# Patient Record
Sex: MALE | Race: WHITE | NOT HISPANIC OR LATINO | Employment: OTHER | URBAN - METROPOLITAN AREA
[De-identification: names, ages, dates, MRNs, and addresses within clinical notes are randomized per-mention and may not be internally consistent; named-entity substitution may affect disease eponyms.]

---

## 2017-01-31 ENCOUNTER — PATIENT OUTREACH (OUTPATIENT)
Dept: ADMINISTRATIVE | Facility: HOSPITAL | Age: 57
End: 2017-01-31

## 2017-01-31 NOTE — PROGRESS NOTES
Eye exam due report.    Due for your annual diabetic eye exam, colon cancer screening, and possibly pneumonia and tetanus immunizations

## 2017-02-08 ENCOUNTER — OFFICE VISIT (OUTPATIENT)
Dept: FAMILY MEDICINE | Facility: CLINIC | Age: 57
End: 2017-02-08
Payer: COMMERCIAL

## 2017-02-08 VITALS
TEMPERATURE: 99 F | SYSTOLIC BLOOD PRESSURE: 132 MMHG | RESPIRATION RATE: 18 BRPM | HEART RATE: 80 BPM | DIASTOLIC BLOOD PRESSURE: 78 MMHG | WEIGHT: 252.19 LBS | HEIGHT: 68 IN | BODY MASS INDEX: 38.22 KG/M2

## 2017-02-08 DIAGNOSIS — J06.9 UPPER RESPIRATORY TRACT INFECTION, UNSPECIFIED TYPE: Primary | ICD-10-CM

## 2017-02-08 PROCEDURE — 99213 OFFICE O/P EST LOW 20 MIN: CPT | Mod: S$GLB,,, | Performed by: INTERNAL MEDICINE

## 2017-02-08 PROCEDURE — 3075F SYST BP GE 130 - 139MM HG: CPT | Mod: S$GLB,,, | Performed by: INTERNAL MEDICINE

## 2017-02-08 PROCEDURE — 3078F DIAST BP <80 MM HG: CPT | Mod: S$GLB,,, | Performed by: INTERNAL MEDICINE

## 2017-02-08 NOTE — PROGRESS NOTES
"Subjective:       Patient ID: Sergo Hollingsworth is a 56 y.o. male.    Current Outpatient Prescriptions   Medication Sig Dispense Refill    amlodipine-benazepril 5-20 mg (LOTREL) 5-20 mg per capsule Take 1 capsule by mouth once daily.       atorvastatin (LIPITOR) 10 MG tablet Take 1 tablet (10 mg total) by mouth once daily. 90 tablet 3    metformin (GLUCOPHAGE) 1000 MG tablet Take 1,000 mg by mouth daily with breakfast.      SITagliptan (JANUVIA) 25 MG Tab Take 1 tablet (25 mg total) by mouth once daily. 90 tablet 3    benazepril (LOTENSIN) 20 MG tablet Take 1 tablet (20 mg total) by mouth once daily. 30 tablet 6    triamterene-hydrochlorothiazide 37.5-25 mg (DYAZIDE) 37.5-25 mg per capsule Take 1 capsule by mouth every morning. 90 capsule 2     No current facility-administered medications for this visit.      Chief Complaint: Cough (x 3 days, "coughing up phlegm") and Nasal Congestion (x 3 days, taking Mucinex)  He presents with 3 days of coughing with congestion.  He has cough productive of grayish mucous.  No fevers. No wheezing or shortness of breath. Mild sore throat. No ear pain.  No body aches.  No vomiting or diarrhea.  No sick contacts. No rashes.      Review of Systems   Constitutional: Negative for activity change, appetite change, chills, fatigue and fever.   HENT: Positive for congestion and postnasal drip. Negative for ear discharge, ear pain, mouth sores, rhinorrhea, sinus pressure and sore throat.    Eyes: Negative for pain, discharge and redness.   Respiratory: Positive for cough. Negative for chest tightness, shortness of breath and wheezing.    Gastrointestinal: Negative for abdominal pain, constipation, diarrhea, nausea and vomiting.   Genitourinary: Negative for dysuria.   Musculoskeletal: Negative for arthralgias and neck stiffness.   Skin: Negative for rash.   Neurological: Negative for headaches.   Hematological: Negative for adenopathy.       Objective:      Vitals:    02/08/17 0825 " "02/08/17 0847   BP: (!) 142/80 132/78   BP Location: Right arm    Patient Position: Sitting    BP Method: Manual    Pulse: 80    Resp: 18    Temp: 98.8 °F (37.1 °C)    TempSrc: Temporal    Weight: 114.4 kg (252 lb 3.3 oz)    Height: 5' 8" (1.727 m)      Body mass index is 38.35 kg/(m^2).  Physical Exam    General appearance: alert, no acute distress  Head: atraumatic  Eyes: PERRL, EMOI, normal conjunctiva, no drainage  Ears: tm normal with good visualization of landmarks, no erythema or pus, canals normal, external ear normal  Nose: erythematous mucosa, no polyps or sores, clear rhinorrhea  Throat: no erythema, no exudates, tonsils appear normal  Mouth: no sores or lesion, moist mucous membranes  Neck: supple, FROM, no masses, no tenderness  Lymph: no posterior or cervical adenopathy  Lungs: no distress, no retractions, clear to ascultation bilaterally, no wheezing, no rales, no rhonchi  Heart:: Regular rate and rhythm, no murmur  Abdomen: soft, non-tender, no guarding, no rebound, no peritoneal signs, bowel sounds normal, no hepatosplenomegaly, no masses  Skin: no rashes or lesion  Perfusion: good capillary refill, normal pulses    Assessment:       1. Upper respiratory tract infection, unspecified type        Plan:       Upper respiratory tract infection, unspecified type  Reassurance and supportive care. No need for antibiotics at this point. Advised of the signs of worsening to return to clinic.      Return if symptoms worsen or fail to improve.      "

## 2017-03-31 RX ORDER — AMLODIPINE AND BENAZEPRIL HYDROCHLORIDE 5; 20 MG/1; MG/1
1 CAPSULE ORAL DAILY
Qty: 90 CAPSULE | Refills: 3 | Status: SHIPPED | OUTPATIENT
Start: 2017-03-31 | End: 2017-08-09 | Stop reason: SDUPTHER

## 2017-03-31 NOTE — TELEPHONE ENCOUNTER
----- Message from Navya Hahn sent at 3/31/2017 10:53 AM CDT -----  Contact: self: [phone]  Refill request:    RX name: amlodipine-benazepril 5-20 mg (LOTREL) 5-20 mg per capsule  Pharmacy name: [facility]  Pharmacy phone/location: [phone]  Phone number where pt can be reached: [phone]

## 2017-04-07 ENCOUNTER — PATIENT MESSAGE (OUTPATIENT)
Dept: FAMILY MEDICINE | Facility: CLINIC | Age: 57
End: 2017-04-07

## 2017-04-07 ENCOUNTER — PATIENT OUTREACH (OUTPATIENT)
Dept: ADMINISTRATIVE | Facility: HOSPITAL | Age: 57
End: 2017-04-07

## 2017-04-07 NOTE — PROGRESS NOTES
PRE-VISIT CHART REVIEW    Appointment Scheduled on 4/21/17    Department stratifications & guidelines reviewed:yes    Target Chronic Diagnosis: DM, HTN, obesity    Chronic Diagnosis Intervention Due: yes    Goals Updated:No    Health Maintenance Due   Topic Date Due    Eye Exam  02/14/1968    TETANUS VACCINE  02/14/1976    Pneumococcal PPSV23 (Medium Risk) (1) 02/14/1976    Colonoscopy  02/14/2008       Advanced Directives:   65 years of age or older?  No  Directive on file?  N\A                                      Pre-visit patient communication: 04/07/2017 MyChart/Letter    Studies or screenings scheduled pre-visit: no    Educate patient on DM (7.6), obesity (38.35)

## 2017-04-28 ENCOUNTER — OFFICE VISIT (OUTPATIENT)
Dept: FAMILY MEDICINE | Facility: CLINIC | Age: 57
End: 2017-04-28
Payer: COMMERCIAL

## 2017-04-28 VITALS
TEMPERATURE: 98 F | RESPIRATION RATE: 18 BRPM | OXYGEN SATURATION: 96 % | HEIGHT: 68 IN | BODY MASS INDEX: 38.19 KG/M2 | DIASTOLIC BLOOD PRESSURE: 84 MMHG | SYSTOLIC BLOOD PRESSURE: 134 MMHG | WEIGHT: 252 LBS | HEART RATE: 84 BPM

## 2017-04-28 DIAGNOSIS — Z12.5 SCREENING FOR PROSTATE CANCER: ICD-10-CM

## 2017-04-28 DIAGNOSIS — Z12.11 SCREEN FOR COLON CANCER: ICD-10-CM

## 2017-04-28 DIAGNOSIS — E11.9 TYPE 2 DIABETES MELLITUS WITHOUT COMPLICATION, WITHOUT LONG-TERM CURRENT USE OF INSULIN: Primary | ICD-10-CM

## 2017-04-28 DIAGNOSIS — I10 ESSENTIAL HYPERTENSION: ICD-10-CM

## 2017-04-28 DIAGNOSIS — Z23 NEED FOR TDAP VACCINATION: ICD-10-CM

## 2017-04-28 DIAGNOSIS — M79.642 LEFT HAND PAIN: ICD-10-CM

## 2017-04-28 DIAGNOSIS — Z23 NEED FOR PNEUMOCOCCAL VACCINATION: ICD-10-CM

## 2017-04-28 DIAGNOSIS — E78.5 HYPERLIPIDEMIA, UNSPECIFIED HYPERLIPIDEMIA TYPE: ICD-10-CM

## 2017-04-28 LAB
ALBUMIN SERPL BCP-MCNC: 4.2 G/DL
ALP SERPL-CCNC: 57 U/L
ALT SERPL W/O P-5'-P-CCNC: 43 U/L
ANION GAP SERPL CALC-SCNC: 9 MMOL/L
AST SERPL-CCNC: 36 U/L
BILIRUB SERPL-MCNC: 0.8 MG/DL
BUN SERPL-MCNC: 13 MG/DL
CALCIUM SERPL-MCNC: 9.5 MG/DL
CHLORIDE SERPL-SCNC: 103 MMOL/L
CHOLEST/HDLC SERPL: 2.6 {RATIO}
CO2 SERPL-SCNC: 25 MMOL/L
COMPLEXED PSA SERPL-MCNC: 0.23 NG/ML
CREAT SERPL-MCNC: 1.2 MG/DL
EST. GFR  (AFRICAN AMERICAN): >60 ML/MIN/1.73 M^2
EST. GFR  (NON AFRICAN AMERICAN): >60 ML/MIN/1.73 M^2
GLUCOSE SERPL-MCNC: 184 MG/DL
HDL/CHOLESTEROL RATIO: 38.9 %
HDLC SERPL-MCNC: 126 MG/DL
HDLC SERPL-MCNC: 49 MG/DL
LDLC SERPL CALC-MCNC: 53.8 MG/DL
NONHDLC SERPL-MCNC: 77 MG/DL
POTASSIUM SERPL-SCNC: 4 MMOL/L
PROT SERPL-MCNC: 8.1 G/DL
SODIUM SERPL-SCNC: 137 MMOL/L
TRIGL SERPL-MCNC: 116 MG/DL

## 2017-04-28 PROCEDURE — 90472 IMMUNIZATION ADMIN EACH ADD: CPT | Mod: S$GLB,,, | Performed by: INTERNAL MEDICINE

## 2017-04-28 PROCEDURE — 80061 LIPID PANEL: CPT

## 2017-04-28 PROCEDURE — 84153 ASSAY OF PSA TOTAL: CPT

## 2017-04-28 PROCEDURE — 83036 HEMOGLOBIN GLYCOSYLATED A1C: CPT

## 2017-04-28 PROCEDURE — 90732 PPSV23 VACC 2 YRS+ SUBQ/IM: CPT | Mod: S$GLB,,, | Performed by: INTERNAL MEDICINE

## 2017-04-28 PROCEDURE — 3079F DIAST BP 80-89 MM HG: CPT | Mod: S$GLB,,, | Performed by: INTERNAL MEDICINE

## 2017-04-28 PROCEDURE — 99214 OFFICE O/P EST MOD 30 MIN: CPT | Mod: 25,S$GLB,, | Performed by: INTERNAL MEDICINE

## 2017-04-28 PROCEDURE — 80053 COMPREHEN METABOLIC PANEL: CPT

## 2017-04-28 PROCEDURE — 4010F ACE/ARB THERAPY RXD/TAKEN: CPT | Mod: S$GLB,,, | Performed by: INTERNAL MEDICINE

## 2017-04-28 PROCEDURE — 90471 IMMUNIZATION ADMIN: CPT | Mod: S$GLB,,, | Performed by: INTERNAL MEDICINE

## 2017-04-28 PROCEDURE — 1160F RVW MEDS BY RX/DR IN RCRD: CPT | Mod: S$GLB,,, | Performed by: INTERNAL MEDICINE

## 2017-04-28 PROCEDURE — 3075F SYST BP GE 130 - 139MM HG: CPT | Mod: S$GLB,,, | Performed by: INTERNAL MEDICINE

## 2017-04-28 PROCEDURE — 90715 TDAP VACCINE 7 YRS/> IM: CPT | Mod: S$GLB,,, | Performed by: INTERNAL MEDICINE

## 2017-04-28 PROCEDURE — 3045F PR MOST RECENT HEMOGLOBIN A1C LEVEL 7.0-9.0%: CPT | Mod: S$GLB,,, | Performed by: INTERNAL MEDICINE

## 2017-04-28 PROCEDURE — 36415 COLL VENOUS BLD VENIPUNCTURE: CPT | Mod: S$GLB,,, | Performed by: INTERNAL MEDICINE

## 2017-04-28 NOTE — PROGRESS NOTES
Venipuncture performed with 23 gauge butterfly, attempts x's 1 , to R Antecubital vein.  Specimens collected per orders.      Pressure dressing applied to site, instructed patient to: remove dressing in 10-15 minutes, OK to re-adjust dressing if pressure causing any discomfort, observe closely for numbness and/or discoloration to hand or fingers, and to notify provider if bleeding persists after applying constant pressure lasting 30 minutes.     Informed patient to follow up as directed by ordering provider.     Patient voices understanding.     After visit summary inclusive of instructions above printed and given to patient.

## 2017-04-28 NOTE — MR AVS SNAPSHOT
[facility]  [address] [address] [address]  [facility] [address]  Phone: [phone]  Fax: [phone]                  Sergo Hollingsworth   2017 8:00 AM   Office Visit    Description:  Male : 1960   Provider:  Kiah Parry DO   Department:  [facility]           Reason for Visit     4 month follow up     Hand Pain, Left hand/wrist           Diagnoses this Visit        Comments    Type 2 diabetes mellitus without complication, without long-term current use of insulin    -  Primary     Essential hypertension         Obesity, Class II, BMI 35-39.9, with comorbidity         Hyperlipidemia, unspecified hyperlipidemia type         Screening for prostate cancer         Screen for colon cancer         Need for Tdap vaccination         Need for pneumococcal vaccination                To Do List           Future Appointments        Provider Department Dept Phone    2017 2:30 PM Keila Alex MD [place] - Ophthalmology [phone]    2017 8:00 AM Kiah Parry DO [facility] [phone]      Goals (5 Years of Data)     None      Follow-Up and Disposition     Return in about 4 months (around 2017) for chronic medical issues.      [facility] On [facility]     [facility] On Call Nurse Care Line -  Assistance  Unless otherwise directed by your provider, please contact [facility], our nurse care line that is available for  assistance.     Registered nurses in the [facility] On Call Center provide: appointment scheduling, clinical advisement, health education, and other advisory services.  Call: [phone] (toll free)               Medications           Message regarding Medications     Verify the changes and/or additions to your medication regime listed below are the same as discussed with your clinician today.  If any of these changes or additions are incorrect, please notify your healthcare provider.        STOP taking  "these medications     benazepril (LOTENSIN) 20 MG tablet Take 1 tablet (20 mg total) by mouth once daily.           Verify that the below list of medications is an accurate representation of the medications you are currently taking.  If none reported, the list may be blank. If incorrect, please contact your healthcare provider. Carry this list with you in case of emergency.           Current Medications     amlodipine-benazepril 5-20 mg (LOTREL) 5-20 mg per capsule Take 1 capsule by mouth once daily.    atorvastatin (LIPITOR) 10 MG tablet Take 1 tablet (10 mg total) by mouth once daily.    metformin (GLUCOPHAGE) 1000 MG tablet Take 1,000 mg by mouth daily with breakfast.    SITagliptan (JANUVIA) 25 MG Tab Take 1 tablet (25 mg total) by mouth once daily.    triamterene-hydrochlorothiazide 37.5-25 mg (DYAZIDE) 37.5-25 mg per capsule Take 1 capsule by mouth every morning.           Clinical Reference Information           Your Vitals Were     BP Pulse Temp Resp Height Weight    134/84 (BP Location: Right arm, Patient Position: Sitting, BP Method: Manual) 84 98.4 °F (36.9 °C) (Oral) 18 5' 8" (1.727 m) 114.3 kg (251 lb 15.8 oz)    SpO2 BMI             96% 38.31 kg/m2         Blood Pressure          Most Recent Value    BP  134/84      Allergies as of 4/28/2017     No Known Allergies      Immunizations Administered on Date of Encounter - 4/28/2017     Name Date Dose VIS Date Route    Pneumococcal Polysaccharide - 23 Valent  Incomplete 0.5 mL 4/24/2015 Intramuscular    TDAP 4/28/2017 0.5 mL 2/24/2015 Intramuscular      Orders Placed During Today's Visit      Normal Orders This Visit    Ambulatory referral to Ophthalmology     Case request GI: COLONOSCOPY     Comprehensive metabolic panel     Hemoglobin A1c     Lipid panel     Pneumococcal Polysaccharide Vaccine (23 Valent) (SQ/IM)     PSA, Screening     Tdap Vaccine       Language Assistance Services     ATTENTION: Language assistance services are available, free of " charge. Please call [phone].      ATENCIÓN: Si habla español, tiene a marcus disposición servicios gratuitos de asistencia lingüística. Llame al [phone].     CHÚ Ý: N?u b?n nói Ti?ng Vi?t, có các d?ch v? h? tr? ngôn ng? mi?n phí dành cho b?n. G?i s? [phone].         [facility] complies with applicable Federal civil rights laws and does not discriminate on the basis of race, color, national origin, age, disability, or sex.

## 2017-04-28 NOTE — PROGRESS NOTES
Subjective:       Patient ID: Sergo Hollingsworth is a 57 y.o. male.    Current Outpatient Prescriptions   Medication Sig Dispense Refill    amlodipine-benazepril 5-20 mg (LOTREL) 5-20 mg per capsule Take 1 capsule by mouth once daily. 90 capsule 3    atorvastatin (LIPITOR) 10 MG tablet Take 1 tablet (10 mg total) by mouth once daily. 90 tablet 3    metformin (GLUCOPHAGE) 1000 MG tablet Take 1,000 mg by mouth daily with breakfast.      SITagliptan (JANUVIA) 25 MG Tab Take 1 tablet (25 mg total) by mouth once daily. 90 tablet 3    triamterene-hydrochlorothiazide 37.5-25 mg (DYAZIDE) 37.5-25 mg per capsule Take 1 capsule by mouth every morning. 90 capsule 2     No current facility-administered medications for this visit.      Chief Complaint: 4 month follow up and Hand Pain, Left hand/wrist  He is here today to f/u on chronic medical issues.     He has hypertension dx since 2006. He says his BP has been elevated since high school. He has no known CAD. He denies cholesterol issues. He is taking combination benazepril-amlodipine 20-5 mg qday and triamterene-HCTZ 37.5-25 mg qday. He does not check his BP at home.      He has diabetes and is taking metformin 1000 mg twice daily and januvia 25 mg qday.  His last HbA1c is 7.6 on 12/2016 (before the addition of januvia).. He had an eye exam in 2/2016 and his foot exam is UTD.  His microalbumin is positive and he is taking an ACEI.  He denies any neuropathy or vision issues.      He has hyperlipidemia and was started on atorvastatin for lipids on 12/2016 that were 185/125/44/115. He is due to recheck today.  He does take an aspirin daily.     He does complain of 3 weeks of left wrist and palm hurting. No swelling or erythema. It was alittle warm.  He wore an immobilizer which seemed to help with his pain. He says it is much better and only hurts if he positions it a certain way. He is not taking any pain medication.      He refused immunizations in the past but is now  "willing to take.      He stays very active. He is not careful with his diet and he does not exercise.       Colonoscopy----2007 repeat in 10 years  Tdap---more than 10 years and refuses  Pneumovax---none  Prenar--none  Influenza vaccine---none  Shingles vaccine---none    Review of Systems   Constitutional: Negative for appetite change, fatigue, fever and unexpected weight change.   HENT: Negative for congestion, ear pain, hearing loss, sore throat and trouble swallowing.    Eyes: Negative for pain and visual disturbance.   Respiratory: Negative for cough, chest tightness, shortness of breath and wheezing.    Cardiovascular: Negative for chest pain, palpitations and leg swelling.   Gastrointestinal: Negative for abdominal pain, blood in stool, constipation, diarrhea, nausea and vomiting.   Endocrine: Negative for polyuria.   Genitourinary: Negative for dysuria and hematuria.   Musculoskeletal: Positive for arthralgias. Negative for back pain and myalgias.   Skin: Negative for rash.   Neurological: Negative for dizziness, weakness, numbness and headaches.   Hematological: Does not bruise/bleed easily.   Psychiatric/Behavioral: Negative for dysphoric mood, sleep disturbance and suicidal ideas. The patient is not nervous/anxious.        Objective:      Vitals:    04/28/17 0814   BP: 134/84   BP Location: Right arm   Patient Position: Sitting   BP Method: Manual   Pulse: 84   Resp: 18   Temp: 98.4 °F (36.9 °C)   TempSrc: Oral   SpO2: 96%   Weight: 114.3 kg (251 lb 15.8 oz)   Height: 5' 8" (1.727 m)     Body mass index is 38.31 kg/(m^2).  Physical Exam    General appearance: No acute distress, cooperative  Eyes: PERRL, EOMI, conjunctiva clear  HEENT: ears normal, nose without rhinorrhea, normal turbinates,mouth no sores or lesions, throat no erythema or pus  Neck: FROM, soft, supple, no thyromegaly, no bruits  Lymph: no anterior or posterior cervical adenopathy  Heart::  Regular rate and rhythm, no murmur  Lung: Clear to " ascultation bilaterally, no wheezing, no rales, no rhonchi, no distress  Abdomen: Soft, nontender, no distention, no hepatosplenomegaly, bowel sounds normal, no guarding, no rebound, no peritoneal signs  Skin: no rashes, no lesions  Extremities: no edema, no cyanosis  Neuro: no focal abnormalities, strength 5/5 b/l UE and LE, 2+ DTRs b/l UE and LE, normal gait  Peripheral pulses: 2+ pedal pulses bilaterally, good perfusion and color  Musculoskeletal: FROM, good strenth, no tenderness  Joint: normal appearance, no swelling, no warmth---ROM of left hand without tenderness, no deformity in all joints    Assessment:       1. Type 2 diabetes mellitus without complication, without long-term current use of insulin    2. Essential hypertension    3. Hyperlipidemia, unspecified hyperlipidemia type    4. Left hand pain    5. Obesity, Class II, BMI 35-39.9, with comorbidity    6. Screening for prostate cancer    7. Screen for colon cancer    8. Need for Tdap vaccination    9. Need for pneumococcal vaccination        Plan:       Type 2 diabetes mellitus without complication, without long-term current use of insulin  Uncontrolled with last HbA1c of 7.6. Will recheck now with the addition of januvia to his metformin.  He is UTD on his foot exam and will refer for eye exam.  He is on an ACEI.    -     Hemoglobin A1c  -     Ambulatory referral to Ophthalmology    Essential hypertension  Controlled on this regime.     Hyperlipidemia, unspecified hyperlipidemia type  REcheck lipids after adding atorvastatin to his regimen.   -     Lipid panel  -     Comprehensive metabolic panel    Left hand pain  Reassurance and supportive care.  Consistent with soft tissue injury that is healing.     Obesity, Class II, BMI 35-39.9, with comorbidity  Discussed need for good nutrition and daily exercise.     Screening for prostate cancer  -     PSA, Screening    Screen for colon cancer  -     Case request GI: COLONOSCOPY    Need for Tdap  vaccination  -     Tdap Vaccine    Need for pneumococcal vaccination  -     Pneumococcal Polysaccharide Vaccine (23 Valent) (SQ/IM)    Return in about 4 months (around 8/28/2017) for chronic medical issues.

## 2017-04-29 LAB
ESTIMATED AVG GLUCOSE: 171 MG/DL
HBA1C MFR BLD HPLC: 7.6 %

## 2017-05-01 ENCOUNTER — PATIENT MESSAGE (OUTPATIENT)
Dept: FAMILY MEDICINE | Facility: CLINIC | Age: 57
End: 2017-05-01

## 2017-05-01 NOTE — TELEPHONE ENCOUNTER
Results given by MyChart.  Labs. Increased his januvia.     Please verify that he has checked and seen this email.     Thanks.

## 2017-05-27 ENCOUNTER — PATIENT MESSAGE (OUTPATIENT)
Dept: FAMILY MEDICINE | Facility: CLINIC | Age: 57
End: 2017-05-27

## 2017-06-08 ENCOUNTER — TELEPHONE (OUTPATIENT)
Dept: GASTROENTEROLOGY | Facility: CLINIC | Age: 57
End: 2017-06-08

## 2017-06-08 ENCOUNTER — PATIENT MESSAGE (OUTPATIENT)
Dept: ENDOSCOPY | Facility: HOSPITAL | Age: 57
End: 2017-06-08

## 2017-06-08 NOTE — TELEPHONE ENCOUNTER
----- Message from Tanya Pires sent at 6/8/2017  7:44 AM CDT -----  Please call patient in regards to prep for colonoscopy, he states he is scheduled for procedure tomorrow, [phone] (home)

## 2017-06-09 ENCOUNTER — SURGERY (OUTPATIENT)
Age: 57
End: 2017-06-09

## 2017-06-09 ENCOUNTER — ANESTHESIA (OUTPATIENT)
Dept: ENDOSCOPY | Facility: HOSPITAL | Age: 57
End: 2017-06-09
Payer: COMMERCIAL

## 2017-06-09 ENCOUNTER — HOSPITAL ENCOUNTER (OUTPATIENT)
Facility: HOSPITAL | Age: 57
Discharge: HOME | End: 2017-06-09
Attending: INTERNAL MEDICINE | Admitting: INTERNAL MEDICINE
Payer: COMMERCIAL

## 2017-06-09 ENCOUNTER — ANESTHESIA EVENT (OUTPATIENT)
Dept: ENDOSCOPY | Facility: HOSPITAL | Age: 57
End: 2017-06-09
Payer: COMMERCIAL

## 2017-06-09 DIAGNOSIS — Z12.11 SCREEN FOR COLON CANCER: ICD-10-CM

## 2017-06-09 LAB — GLUCOSE SERPL-MCNC: 215 MG/DL (ref 70–110)

## 2017-06-09 PROCEDURE — 63600175 PHARM REV CODE 636 W HCPCS: Mod: PO | Performed by: NURSE ANESTHETIST, CERTIFIED REGISTERED

## 2017-06-09 PROCEDURE — G0121 COLON CA SCRN NOT HI RSK IND: HCPCS | Mod: PO | Performed by: INTERNAL MEDICINE

## 2017-06-09 PROCEDURE — G0121 COLON CA SCRN NOT HI RSK IND: HCPCS | Mod: ,,, | Performed by: INTERNAL MEDICINE

## 2017-06-09 PROCEDURE — 37000008 HC ANESTHESIA 1ST 15 MINUTES: Mod: PO | Performed by: INTERNAL MEDICINE

## 2017-06-09 PROCEDURE — 37000009 HC ANESTHESIA EA ADD 15 MINS: Mod: PO | Performed by: INTERNAL MEDICINE

## 2017-06-09 PROCEDURE — D9220A PRA ANESTHESIA: Mod: 33,ANES,, | Performed by: ANESTHESIOLOGY

## 2017-06-09 PROCEDURE — 82962 GLUCOSE BLOOD TEST: CPT | Mod: PO | Performed by: INTERNAL MEDICINE

## 2017-06-09 PROCEDURE — D9220A PRA ANESTHESIA: Mod: 33,CRNA,, | Performed by: NURSE ANESTHETIST, CERTIFIED REGISTERED

## 2017-06-09 PROCEDURE — 25000003 PHARM REV CODE 250: Mod: PO | Performed by: NURSE ANESTHETIST, CERTIFIED REGISTERED

## 2017-06-09 PROCEDURE — 25000003 PHARM REV CODE 250: Mod: PO | Performed by: INTERNAL MEDICINE

## 2017-06-09 RX ORDER — SODIUM CHLORIDE, SODIUM LACTATE, POTASSIUM CHLORIDE, CALCIUM CHLORIDE 600; 310; 30; 20 MG/100ML; MG/100ML; MG/100ML; MG/100ML
INJECTION, SOLUTION INTRAVENOUS CONTINUOUS
Status: DISCONTINUED | OUTPATIENT
Start: 2017-06-09 | End: 2017-06-09 | Stop reason: HOSPADM

## 2017-06-09 RX ORDER — LIDOCAINE HCL/PF 100 MG/5ML
SYRINGE (ML) INTRAVENOUS
Status: DISCONTINUED | OUTPATIENT
Start: 2017-06-09 | End: 2017-06-09

## 2017-06-09 RX ORDER — PROPOFOL 10 MG/ML
VIAL (ML) INTRAVENOUS
Status: DISCONTINUED | OUTPATIENT
Start: 2017-06-09 | End: 2017-06-09

## 2017-06-09 RX ADMIN — PROPOFOL 30 MG: 10 INJECTION, EMULSION INTRAVENOUS at 10:06

## 2017-06-09 RX ADMIN — SODIUM CHLORIDE, SODIUM LACTATE, POTASSIUM CHLORIDE, AND CALCIUM CHLORIDE: .6; .31; .03; .02 INJECTION, SOLUTION INTRAVENOUS at 09:06

## 2017-06-09 RX ADMIN — LIDOCAINE HYDROCHLORIDE 100 MG: 20 INJECTION, SOLUTION INTRAVENOUS at 10:06

## 2017-06-09 NOTE — TRANSFER OF CARE
"Anesthesia Transfer of Care Note    Patient: Sergo Hollingsworth    Procedure(s) Performed: Procedure(s) (LRB):  COLONOSCOPY (N/A)    Patient location: PACU    Anesthesia Type: general    Transport from OR: Transported from OR on room air with adequate spontaneous ventilation    Post pain: adequate analgesia    Post assessment: no apparent anesthetic complications and tolerated procedure well    Post vital signs: stable    Level of consciousness: awake and alert    Nausea/Vomiting: no nausea/vomiting    Complications: none    Transfer of care protocol was followed      Last vitals:   Visit Vitals  BP (!) 141/82   Pulse 77   Temp 37 °C (98.6 °F)   Resp 18   Ht 5' 9" (1.753 m)   Wt 113.4 kg (250 lb)   SpO2 97%   BMI 36.92 kg/m²     "

## 2017-06-09 NOTE — H&P
History & Physical - Short Stay  Gastroenterology      SUBJECTIVE:     Procedure: Colonoscopy    Chief Complaint/Indication for Procedure: Screening    PTA Medications   Medication Sig    atorvastatin (LIPITOR) 10 MG tablet Take 1 tablet (10 mg total) by mouth once daily.    metformin (GLUCOPHAGE) 1000 MG tablet Take 1,000 mg by mouth daily with breakfast.    SITagliptan (JANUVIA) 50 MG Tab Take 1 tablet (50 mg total) by mouth once daily.    triamterene-hydrochlorothiazide 37.5-25 mg (DYAZIDE) 37.5-25 mg per capsule Take 1 capsule by mouth every morning.    amlodipine-benazepril 5-20 mg (LOTREL) 5-20 mg per capsule Take 1 capsule by mouth once daily.       Review of patient's allergies indicates:  No Known Allergies     Past Medical History:   Diagnosis Date    Arthritis     Diabetes mellitus, type 2     Hypertension      Past Surgical History:   Procedure Laterality Date    KNEE SURGERY  2010    right knee to repair a torn meniscus     Family History   Problem Relation Age of Onset    Cancer Mother      pancreatic    Cancer Father      lung    Diabetes Sister     Hypertension Sister     Hypertension Brother     Heart disease Maternal Grandmother     Hypertension Maternal Grandmother     Diabetes Paternal Grandmother     Heart disease Paternal Grandmother     Hypertension Paternal Grandmother     Throat cancer Maternal Grandfather     Lung cancer Paternal Grandfather      Social History   Substance Use Topics    Smoking status: Never Smoker    Smokeless tobacco: Not on file    Alcohol use Yes      Comment: social         OBJECTIVE:     Vital Signs (Most Recent)  Temp: 98.6 °F (37 °C) (06/09/17 0919)  Pulse: 77 (06/09/17 0919)  Resp: 18 (06/09/17 0919)  BP: (!) 141/82 (06/09/17 0919)  SpO2: 97 % (06/09/17 0919)    Physical Exam                                                        GENERAL:  Comfortable, in no acute distress.                                 HEENT EXAM:  Nonicteric.  No  adenopathy.  Oropharynx is clear.               NECK:  Supple.                                                               LUNGS:  Clear.                                                               CARDIAC:  Regular rate and rhythm.  S1, S2.  No murmur.                      ABDOMEN:  Soft, positive bowel sounds, nontender.  No hepatosplenomegaly or masses.  No rebound or guarding.                                             EXTREMITIES:  No edema.     MENTAL STATUS:  Normal, alert and oriented.      ASSESSMENT/PLAN:     Assessment: Colorectal cancer screening    Plan: Colonoscopy    Anesthesia Plan: General    ASA Grade: ASA 2 - Patient with mild systemic disease with no functional limitations    MALLAMPATI SCORE:  I (soft palate, uvula, fauces, and tonsillar pillars visible)

## 2017-06-09 NOTE — DISCHARGE SUMMARY
Discharge Note  Short Stay      SUMMARY     Admit Date: 6/9/2017    Attending Physician: Mickey Kahn MD     Discharge Physician: Mickey Kahn MD    Discharge Date: 6/9/2017 10:43 AM    Final Diagnosis: Screen for colon cancer [Z12.11]    Disposition: HOME OR SELF CARE    Patient Instructions:   Current Discharge Medication List      CONTINUE these medications which have NOT CHANGED    Details   atorvastatin (LIPITOR) 10 MG tablet Take 1 tablet (10 mg total) by mouth once daily.  Qty: 90 tablet, Refills: 3      metformin (GLUCOPHAGE) 1000 MG tablet Take 1,000 mg by mouth daily with breakfast.      SITagliptan (JANUVIA) 50 MG Tab Take 1 tablet (50 mg total) by mouth once daily.  Qty: 90 tablet, Refills: 3      triamterene-hydrochlorothiazide 37.5-25 mg (DYAZIDE) 37.5-25 mg per capsule Take 1 capsule by mouth every morning.  Qty: 90 capsule, Refills: 2      amlodipine-benazepril 5-20 mg (LOTREL) 5-20 mg per capsule Take 1 capsule by mouth once daily.  Qty: 90 capsule, Refills: 3             Discharge Procedure Orders (must include Diet, Follow-up, Activity)    Follow Up:  Follow up with PCP as previously scheduled  Resume routine diet.  Activity as tolerated.    No driving day of procedure.

## 2017-06-09 NOTE — DISCHARGE INSTRUCTIONS
Procedural Sedation (Adult)  You have been given medicine by vein to make you sleep during your surgery. This may have included both a pain medicine and sleeping medicine. Most of the effects have worn off. But you may still have some drowsiness for the next 6 to 8 hours.  Home care  Follow these guidelines when you get home:  · For the next 8 hours, you should be watched by a responsible adult. This person should make sure your condition is not getting worse.  · Don't take any medicine by mouth for pain or for sleep during the next 4 hours. These might react with the medicines you were given in the hospital. This could cause a much stronger response than usual.  · Don't drink any alcohol for the next 24 hours.  · Don't drive, operate dangerous machinery, or make important business or personal decisions during the next 24 hours.  Follow-up care  Follow up with your healthcare provider if you are not alert and back to your usual level of activity within 12 hours.  When to seek medical advice  Call your healthcare provider right away if any of these occur:  · Drowsiness gets worse  · Weakness or dizziness gets worse  · Repeated vomiting  · You cannot be awakened   Date Last Reviewed: 10/18/2016  © 6137-5005 [employer]. [address] [place] [address], [place] [address]. All rights reserved. This information is not intended as a substitute for professional medical care. Always follow your healthcare professional's instructions.

## 2017-06-09 NOTE — ANESTHESIA POSTPROCEDURE EVALUATION
"Anesthesia Post Evaluation    Patient: Sergo Hollingsworth    Procedure(s) Performed: Procedure(s) (LRB):  COLONOSCOPY (N/A)    Final Anesthesia Type: general  Patient location during evaluation: PACU  Patient participation: Yes- Able to Participate  Level of consciousness: awake and alert  Post-procedure vital signs: reviewed and stable  Pain management: adequate  Airway patency: patent  PONV status at discharge: No PONV  Anesthetic complications: no      Cardiovascular status: blood pressure returned to baseline and hemodynamically stable  Respiratory status: unassisted  Hydration status: euvolemic  Follow-up not needed.        Visit Vitals  /70   Pulse 79   Temp 36.4 °C (97.5 °F) (Skin)   Resp 16   Ht 5' 9" (1.753 m)   Wt 113.4 kg (250 lb)   SpO2 99%   BMI 36.92 kg/m²       Pain/Saida Score: Pain Assessment Performed: Yes (6/9/2017 11:07 AM)  Presence of Pain: denies (6/9/2017 11:07 AM)  Saida Score: 10 (6/9/2017 11:07 AM)      "

## 2017-06-09 NOTE — ANESTHESIA PREPROCEDURE EVALUATION
06/09/2017  Sergo Hollingsworth is a 57 y.o., male.    Anesthesia Evaluation    I have reviewed the Patient Summary Reports.    I have reviewed the Nursing Notes.      Review of Systems  Cardiovascular:   Hypertension, well controlled    Endocrine:   Diabetes, well controlled, type 2        Physical Exam  General:  Well nourished                 Anesthesia Plan  Type of Anesthesia, risks & benefits discussed:  Anesthesia Type:  general  Patient's Preference:   Intra-op Monitoring Plan:   Intra-op Monitoring Plan Comments:   Post Op Pain Control Plan:   Post Op Pain Control Plan Comments:   Induction:   IV  Beta Blocker:  Patient is not currently on a Beta-Blocker (No further documentation required).       Informed Consent: Patient understands risks and agrees with Anesthesia plan.  Questions answered. Anesthesia consent signed with patient.  ASA Score: 2     Day of Surgery Review of History & Physical:    H&P update referred to the surgeon.         Ready For Surgery From Anesthesia Perspective.

## 2017-06-12 VITALS
OXYGEN SATURATION: 99 % | TEMPERATURE: 98 F | WEIGHT: 250 LBS | DIASTOLIC BLOOD PRESSURE: 70 MMHG | BODY MASS INDEX: 37.03 KG/M2 | SYSTOLIC BLOOD PRESSURE: 119 MMHG | RESPIRATION RATE: 16 BRPM | HEART RATE: 79 BPM | HEIGHT: 69 IN

## 2017-06-13 ENCOUNTER — OFFICE VISIT (OUTPATIENT)
Dept: OPHTHALMOLOGY | Facility: CLINIC | Age: 57
End: 2017-06-13
Payer: COMMERCIAL

## 2017-06-13 DIAGNOSIS — H52.7 REFRACTIVE ERROR: ICD-10-CM

## 2017-06-13 DIAGNOSIS — E11.9 DIABETES MELLITUS TYPE 2 WITHOUT RETINOPATHY: Primary | ICD-10-CM

## 2017-06-13 DIAGNOSIS — H18.821 CONTACT LENS OVERWEAR OF RIGHT EYE: ICD-10-CM

## 2017-06-13 PROCEDURE — 99999 PR PBB SHADOW E&M-EST. PATIENT-LVL II: CPT | Mod: PBBFAC,,, | Performed by: OPHTHALMOLOGY

## 2017-06-13 PROCEDURE — 92004 COMPRE OPH EXAM NEW PT 1/>: CPT | Mod: S$GLB,,, | Performed by: OPHTHALMOLOGY

## 2017-06-13 NOTE — PROGRESS NOTES
HPI     Here for Diabetic exam, NIDDM 11 years states doc would like to see   better control. Denies eye pain or discomfort does not use any eye gtts   currently. States had injury to eye unsure which eye at age 17 had nail   hit eye had to be patched no surgery . No known family h/.o eye disease.   SCL wearer sleeps in contacts for a month at a time.      Hemoglobin A1C       Date                     Value               Ref Range             Status                04/28/2017               7.6 (H)             4.5 - 6.2 %           Final              Comment:    According to ADA guidelines, hemoglobin A1C <7.0% represents  optimal   control in non-pregnant diabetic patients.  Different  metrics may apply   to specific populations.   Standards of Medical Care in Diabetes -   2016.  For the purpose of screening for the presence of diabetes:  <5.7%       Consistent with the absence of diabetes  5.7-6.4%  Consistent with   increasing risk for diabetes   (prediabetes)  >or=6.5%  Consistent with   diabetes  Currently no consensus exists for use of hemoglobin A1C  for   diagnosis of diabetes for children.         12/16/2016               7.6 (H)             4.5 - 6.2 %           Final              Comment:    According to ADA guidelines, hemoglobin A1C <7.0% represents  optimal   control in non-pregnant diabetic patients.  Different  metrics may apply   to specific populations.   Standards of Medical Care in Diabetes -   2016.  For the purpose of screening for the presence of diabetes:  <5.7%       Consistent with the absence of diabetes  5.7-6.4%  Consistent with   increasing risk for diabetes   (prediabetes)  >or=6.5%  Consistent with   diabetes  Currently no consensus exists for use of hemoglobin A1C  for   diagnosis of diabetes for children.    ----------    Agree with above. Presents with wife, Shari. Last eye exam for contacts   about 2 years ago. Contacts are Aqua something, states he stretches it   more. Current  contact lens probably over 3 months old. Only wears OD.   Monovision in glasses as well.    Last edited by Keila Alex MD on 6/13/2017  4:08 PM.   (History)        ROS     Positive for: Neurological (feet get jumpy sometimes, denies pain),   Endocrine (DM diagnosed around 2006), Cardiovascular (HTN - controlled   with meds per pt), Eyes (nail hit eye - patched, no surgery; hx stye, no   other eye procedures)    Negative for: Genitourinary (denies nephropathy), Respiratory (denies   asthma)    Last edited by Keila Alex MD on 6/13/2017  4:01 PM.   (History)        Assessment /Plan     For exam results, see Encounter Report.    Diabetes mellitus type 2 without retinopathy    Contact lens overwear of right eye    Refractive error          Diabetes mellitus type 2 without retinopathy - recommend annual DFE with Dr. Velázquez    Contact lens overwear of right eye - mild pannus considering! I do not recommend sleeping in contacts or wearing longer than designed, especially with diabetes. Refer to Dr. Velázquez for updated Rx.    Refractive error - doing well with monovision OD distance OS near.

## 2017-06-13 NOTE — LETTER
June 13, 2017      Kiah Parry DO  [address] [address]  [address]  [facility] [address]           [facility]  [address] [facility] [address] [place] [address]  Phone: [phone]  Fax: [phone]          Patient: Sergo Hollingsworth   MR Number: [id number]   YOB: 1960   Date of Visit: 6/13/2017       Dear Dr. Kiah Parry:    Thank you for referring Sergo Hollingsworth to me for evaluation. Attached you will find relevant portions of my assessment and plan of care.    If you have questions, please do not hesitate to call me. I look forward to following Srego Hollingsworth along with you.    Sincerely,    Keila Alex MD    Enclosure  CC:  No Recipients    If you would like to receive this communication electronically, please contact [email or web address] or [phone] to request more information on [facility] [employer] access.    For providers and/or their staff who would like to refer a patient to [facility], please contact us through our one-stop-shop provider referral line, [facility], at [phone].    If you feel you have received this communication in error or would no longer like to receive these types of communications, please e-mail [email or web address]

## 2017-06-26 ENCOUNTER — OFFICE VISIT (OUTPATIENT)
Dept: OPTOMETRY | Facility: CLINIC | Age: 57
End: 2017-06-26
Payer: COMMERCIAL

## 2017-06-26 DIAGNOSIS — H52.13 MYOPIA WITH ASTIGMATISM, BILATERAL: Primary | ICD-10-CM

## 2017-06-26 DIAGNOSIS — H52.203 MYOPIA WITH ASTIGMATISM, BILATERAL: Primary | ICD-10-CM

## 2017-06-26 DIAGNOSIS — Z46.0 CONTACT LENS/GLASSES FITTING: ICD-10-CM

## 2017-06-26 PROCEDURE — 99999 PR PBB SHADOW E&M-EST. PATIENT-LVL II: CPT | Mod: PBBFAC,,, | Performed by: OPTOMETRIST

## 2017-06-26 PROCEDURE — 92015 DETERMINE REFRACTIVE STATE: CPT | Mod: S$GLB,,, | Performed by: OPTOMETRIST

## 2017-06-26 PROCEDURE — 92310 CONTACT LENS FITTING OU: CPT | Mod: ,,, | Performed by: OPTOMETRIST

## 2017-06-27 NOTE — PROGRESS NOTES
HPI     Blurred Vision    Additional comments: refraction for updated specs rx           Contact Lens Fit    Additional comments: hx of overwear --- wears distance cls OD only            Comments   Agree above  Monovision with OD distanced / uncorrected        Last edited by LU Velázquez, OD on 6/26/2017  4:46 PM. (History)        ROS     Positive for: Eyes    Negative for: Constitutional, Gastrointestinal, Neurological, Skin,   Genitourinary, Musculoskeletal, HENT, Endocrine, Cardiovascular,   Respiratory, Psychiatric, Allergic/Imm, Heme/Lymph    Last edited by LU Velázquez, OD on 6/26/2017  4:42 PM. (History)        Assessment /Plan     For exam results, see Encounter Report.    Myopia with astigmatism, bilateral    Contact lens/glasses fitting      Updated specs rx, gave copy, fill prn  Updated clrx, gave copy and extra trial    Advised reduced EW more healthy, advised DW only    EXTENDED WEAR CONTACT LENSES  Continue with Extended Wear of contact lenses, up to maximum nights discussed--3 nights max.    Continue with approved contact lens disinfection / rewetting drops as discussed.  Dispose of lenses every 2 weeks as discussed.  Extended wear of contact lenses increases your risk of corneal abrasion, infection, and ulceration.  Stop wearing your lenses and call our office if redness, blurred vision, or pain persists more than 12 hours.  We recommend an annual eye exam for contact lens patients.        Discussed and educated patient on current findings /plan.  RTC 1 year, prn if any changes / issues

## 2017-06-28 ENCOUNTER — OFFICE VISIT (OUTPATIENT)
Dept: PRIMARY CARE CLINIC | Facility: CLINIC | Age: 57
End: 2017-06-28
Payer: COMMERCIAL

## 2017-06-28 VITALS
HEART RATE: 89 BPM | TEMPERATURE: 98 F | HEIGHT: 69 IN | BODY MASS INDEX: 36.9 KG/M2 | WEIGHT: 249.13 LBS | DIASTOLIC BLOOD PRESSURE: 76 MMHG | SYSTOLIC BLOOD PRESSURE: 126 MMHG | OXYGEN SATURATION: 96 %

## 2017-06-28 DIAGNOSIS — J06.9 VIRAL URI: Primary | ICD-10-CM

## 2017-06-28 PROCEDURE — 99999 PR PBB SHADOW E&M-EST. PATIENT-LVL IV: CPT | Mod: PBBFAC,,, | Performed by: NURSE PRACTITIONER

## 2017-06-28 PROCEDURE — 99213 OFFICE O/P EST LOW 20 MIN: CPT | Mod: S$GLB,,, | Performed by: NURSE PRACTITIONER

## 2017-06-28 RX ORDER — AMOXICILLIN 875 MG/1
875 TABLET, FILM COATED ORAL EVERY 12 HOURS
Qty: 14 TABLET | Refills: 0 | Status: SHIPPED | OUTPATIENT
Start: 2017-06-28 | End: 2017-07-05

## 2017-06-28 NOTE — PROGRESS NOTES
"Sergo Hollingsworth is a 57 y.o. male patient of Kiah Parry DO who presents to the clinic today for   Chief Complaint   Patient presents with    Sinusitis     past two days and worse this morning    Nasal Congestion    Cough    Fever   .    HPI    Pt, who is not known to me, reports head feeling full, this morning head "really heavy" but cleared t/o the day, feeling off balance, slight cough, feverish this afternoon, starting to have drip..    These symptoms began 2 days ago and status is worse.     Symptoms are + acute .    Pt denies the following symptoms:  RN    Aggravating factors include nothing .    Relieving factors include nothing .    OTC Medications tried are Zyrtec last night--maybe helped a little.    Prescription medications taken for symptoms are none.    Pertinent medical history:  H/o DM, HTN.  No h/o sinus problems, allergies or lung problems..    Smoking status:  nonsmoker    ROS    Constitutional:   [place] he had a fever this afternoon, + fatigue, no change in appetite.    Head:   Yesterday had mild occip headache  Ears:   No pain.  Eyes:  No sxs  Nose:   No sinus pain, + congestion, no runny nose but is able to blow the nose and gets a lot of thick clear d/c, + post nasal drip.  Throat:  No ST pain.    Heart:  No palpitations, chest pain.    Lungs:  No difficulty breathing, occ coughing, no sputum production.    GI/:  No sxs    MS:  No new bone, joint or muscle problems.    Skin:  No rashes, itching.      PAST MEDICAL HISTORY:  Past Medical History:   Diagnosis Date    Arthritis     Diabetes mellitus, type 2     Hypertension        PAST SURGICAL HISTORY:  Past Surgical History:   Procedure Laterality Date    COLONOSCOPY N/A 6/9/2017    Procedure: COLONOSCOPY;  Surgeon: Mickey Kahn MD;  [facility]: [facility];  Service: Endoscopy;  Laterality: N/A;    KNEE SURGERY  2010    right knee to repair a torn meniscus       SOCIAL HISTORY:  Social History     Social History    Marital " status:      Spouse name: N/A    Number of children: N/A    Years of education: N/A     Occupational History          Social History Main Topics    Smoking status: Never Smoker    Smokeless tobacco: Current User     Types: Chew    Alcohol use Yes      Comment: social    Drug use: No    Sexual activity: Yes     Partners: Female     Other Topics Concern    Not on file     Social History Narrative    No narrative on file       FAMILY HISTORY:  Family History   Problem Relation Age of Onset    Cancer Mother      pancreatic    Cancer Father      lung    Diabetes Sister     Hypertension Sister     Hypertension Brother     Heart disease Maternal Grandmother     Hypertension Maternal Grandmother     Diabetes Paternal Grandmother     Heart disease Paternal Grandmother     Hypertension Paternal Grandmother     Throat cancer Maternal Grandfather     Lung cancer Paternal Grandfather        ALLERGIES AND MEDICATIONS: updated and reviewed.  Review of patient's allergies indicates:  No Known Allergies  Current Outpatient Prescriptions   Medication Sig Dispense Refill    amlodipine-benazepril 5-20 mg (LOTREL) 5-20 mg per capsule Take 1 capsule by mouth once daily. 90 capsule 3    atorvastatin (LIPITOR) 10 MG tablet Take 1 tablet (10 mg total) by mouth once daily. 90 tablet 3    metformin (GLUCOPHAGE) 1000 MG tablet Take 1,000 mg by mouth daily with breakfast.      SITagliptan (JANUVIA) 50 MG Tab Take 1 tablet (50 mg total) by mouth once daily. 90 tablet 3    triamterene-hydrochlorothiazide 37.5-25 mg (DYAZIDE) 37.5-25 mg per capsule Take 1 capsule by mouth every morning. 90 capsule 2     No current facility-administered medications for this visit.        PHYSICAL EXAM    Alert, coop 57 y.o. male patient in no acute distress.    Vitals:    06/28/17 1634   BP: 126/76   Pulse: 89   Temp: 97.6 °F (36.4 °C)     VS reviewed.  VS stable.  CC, nursing note, medications & PMH all reviewed  today.    Head:  Normocephalic, atraumatic.    EENT:  EACs patent.  TMs no erythema, normal LR, no effusions, no TM perforation.                              Eye lids normal, no discharge present.       Sinus tenderness to palp--none.               Nares--+ edema, no d/c present.    Pharynx not injected.                Tonsils not erythematous , not enlarged, no exudate present.    Small left NT anterior, no posterior cervical lymph nodes palpable.    No submental, submandibular or supraclavicular lymph nodes palp.             Resp:  Respirations even, unlabored   Lungs CTA bilat.  No wheezing.  No crackles.  Moves air to bases bilat.    Heart:  RRR, no MRG.    MS:  Ambulates normally.             NEURO:  Alert and oriented x 4.  Responds appropriately during interaction.    Skin:  Warm, dry, color good.    Viral URI  -     amoxicillin (AMOXIL) 875 MG tablet; Take 1 tablet (875 mg total) by mouth every 12 (twelve) hours.  Dispense: 14 tablet; Refill: 0      Pt today presents with 2 days of sinus pressure w/clear d/c, drip, mild ST and starting to cough and feel drip.  S/s consistent with viral URI.  However, the last time he had these sxs, he was advised it was viral.  He continued to worsen so he went to the walk in clinic and got Amoxil and a steroid injection and felt better immediately.  He would like to get AB today.  Rx was given with info on when a sinus infection is likely to be bacterial and benefit from antibiotics.      The patient requests a steroid injection.  We reviewed the risks (suppression of the immune system at a time when the body is fighting the infection, lipodystrophy) and benefits (reducing the coughing and/or nasal congestion).  The steroid injection is deferred today.    .    This is a new problem to me.  No work up is planned.        Pt advised to perform comfort measures recommended on patient instruction sheet .    If not better in 7 days, the patient is advised to call us.  If worse or  concerns, the patient is advised to call us.  Explained exam findings, diagnosis and treatment plan to patient.  Questions answered and patient states understanding.

## 2017-06-28 NOTE — PATIENT INSTRUCTIONS
"Your symptoms today are consistent with viral respiratory infection..  This is likely a viral illness that needs to run its course.    If your sinuses continue to worsen over the next 2 days, aren't better in a week or if you get better then worse again, call and we'll add antibiotics.    Comfort measures:  Increase fluids  Get plenty of rest  Vaporizer or frequent showers may be helpful.  Take tylenol of ibuprofen as needed for pain or fever  For cough and thick mucus secretions, you can take over the counter guaifenesin and dextromethorphan (mucinex DM), drink plenty of water while taking this to help loosen mucus.  If you have high blood pressure, avoid any over the counter medications with "D" or decongestant.    Salt water gargles.  Saline nasal spray  Wash cloth with warm water placed over the sinus area can lessen discomfort and pressure.  Sleep with head of bed elevated to decrease drainage, cough and congestion.    I recommend frequent handwashing to limit spread of infection to others     If you are not better in 7 days, if worse or you have concerns or questions, please do not hesitate to call.  You can reach us at [phone] Monday through Friday (except holidays) 12 nooon to 6 p.m.  Or you can follow up with your primary care provider/NP.    Thank you for using the [facility]!    CASTILLO Ashby, CNP, FNP-BC  [facility]  [facility]    "

## 2017-06-28 NOTE — Clinical Note
Kiah Parry, DO,  I saw your patient today in the [facility].  If you have any questions, please do not hesitate to contact me.  Thank you!  BALAJI Ashby

## 2017-07-13 ENCOUNTER — TELEPHONE (OUTPATIENT)
Dept: OPHTHALMOLOGY | Facility: CLINIC | Age: 57
End: 2017-07-13

## 2017-07-13 NOTE — TELEPHONE ENCOUNTER
----- Message from Claus Ojeda sent at 7/13/2017 12:02 PM CDT -----  Contact: Sergo Hollingsworth [[id number]]  Pt has a question about contacts Rx,please call back at [phone],thanks

## 2017-08-09 RX ORDER — AMLODIPINE AND BENAZEPRIL HYDROCHLORIDE 5; 20 MG/1; MG/1
1 CAPSULE ORAL DAILY
Qty: 90 CAPSULE | Refills: 3 | Status: SHIPPED | OUTPATIENT
Start: 2017-08-09 | End: 2018-09-13 | Stop reason: SDUPTHER

## 2017-08-09 RX ORDER — ATORVASTATIN CALCIUM 10 MG/1
10 TABLET, FILM COATED ORAL DAILY
Qty: 90 TABLET | Refills: 3 | Status: SHIPPED | OUTPATIENT
Start: 2017-08-09 | End: 2018-09-13 | Stop reason: SDUPTHER

## 2017-08-09 RX ORDER — TRIAMTERENE AND HYDROCHLOROTHIAZIDE 37.5; 25 MG/1; MG/1
1 CAPSULE ORAL EVERY MORNING
Qty: 90 CAPSULE | Refills: 2 | Status: SHIPPED | OUTPATIENT
Start: 2017-08-09 | End: 2018-09-13 | Stop reason: SDUPTHER

## 2017-10-30 ENCOUNTER — PATIENT MESSAGE (OUTPATIENT)
Dept: FAMILY MEDICINE | Facility: CLINIC | Age: 57
End: 2017-10-30

## 2017-10-31 RX ORDER — METFORMIN HYDROCHLORIDE 1000 MG/1
1000 TABLET ORAL
Qty: 90 TABLET | Refills: 3 | Status: SHIPPED | OUTPATIENT
Start: 2017-10-31 | End: 2018-02-14 | Stop reason: SDUPTHER

## 2017-12-13 ENCOUNTER — TELEPHONE (OUTPATIENT)
Dept: FAMILY MEDICINE | Facility: CLINIC | Age: 57
End: 2017-12-13

## 2017-12-13 DIAGNOSIS — E11.9 TYPE 2 DIABETES MELLITUS WITHOUT COMPLICATION, WITHOUT LONG-TERM CURRENT USE OF INSULIN: Primary | ICD-10-CM

## 2017-12-13 NOTE — TELEPHONE ENCOUNTER
----- Message from Michaela Maciel sent at 12/13/2017 10:53 AM CST -----  Contact: [phone]  Pt has an appt with Dr. Parry for 1-11-18.  States he received a message advising him that he is due for A1C and urine protein ck.  Is asking for appt for that prior to him coming in for Dr. Parry appt.  Pls call pt to inform.

## 2017-12-14 NOTE — TELEPHONE ENCOUNTER
"Spoke to patient and informed. He stated he would like to come "after Arnav but not sure when". Patient stated he would "just come fasting one morning".   "

## 2017-12-14 NOTE — TELEPHONE ENCOUNTER
There are future labs ordered already (CMP, HbA1c, lipids). I will add a microalbumin.     He can get this month.   thanks

## 2017-12-28 ENCOUNTER — PATIENT OUTREACH (OUTPATIENT)
Dept: ADMINISTRATIVE | Facility: HOSPITAL | Age: 57
End: 2017-12-28

## 2017-12-28 DIAGNOSIS — E11.00 TYPE 2 DIABETES MELLITUS WITH HYPEROSMOLARITY WITHOUT COMA, UNSPECIFIED LONG TERM INSULIN USE STATUS: Primary | ICD-10-CM

## 2017-12-28 NOTE — LETTER
January 4, 2018    Sergo Juradobodeaux  [address]  [place] [address]             [facility]  [address]  [facility] [address]  Phone: [phone] Dear Mr. Hollingsworth:    We have tried to reach you by mychart unsuccessfully.    [facility] is committed to your overall health.  To help you get the most out of each of your visits, we will review your information to make sure you are up to date on all of your recommended tests and/or procedures.       Dr. Kiah Parry has found that your chart shows you may be due for some labs for your diabetes, a urine test for your kidneys, your annual diabetic foot exam and a flu immunization.     If you have had any of the above done at another facility, please bring the records or information with you so that your record at [facility] will be complete.  If you would like to schedule any of these, please contact me.     If you are currently taking medication, please bring it with you to your appointment for review.     If you have any questions or concerns, please don't hesitate to call.    Thank you for letting us care for you,  Inés Blanchard LPN Clinical Care Coordinator  [facility]  [phone]

## 2018-01-10 ENCOUNTER — LAB VISIT (OUTPATIENT)
Dept: LAB | Facility: HOSPITAL | Age: 58
End: 2018-01-10
Attending: INTERNAL MEDICINE
Payer: COMMERCIAL

## 2018-01-10 DIAGNOSIS — E11.9 TYPE 2 DIABETES MELLITUS WITHOUT COMPLICATION, WITHOUT LONG-TERM CURRENT USE OF INSULIN: ICD-10-CM

## 2018-01-10 LAB
CREAT UR-MCNC: 56 MG/DL
MICROALBUMIN UR DL<=1MG/L-MCNC: <2.5 UG/ML
MICROALBUMIN/CREATININE RATIO: NORMAL UG/MG

## 2018-01-10 PROCEDURE — 82043 UR ALBUMIN QUANTITATIVE: CPT

## 2018-01-11 ENCOUNTER — OFFICE VISIT (OUTPATIENT)
Dept: FAMILY MEDICINE | Facility: CLINIC | Age: 58
End: 2018-01-11
Payer: COMMERCIAL

## 2018-01-11 VITALS
SYSTOLIC BLOOD PRESSURE: 134 MMHG | OXYGEN SATURATION: 98 % | BODY MASS INDEX: 37.06 KG/M2 | WEIGHT: 250.25 LBS | HEART RATE: 87 BPM | DIASTOLIC BLOOD PRESSURE: 86 MMHG | RESPIRATION RATE: 17 BRPM | TEMPERATURE: 98 F | HEIGHT: 69 IN

## 2018-01-11 DIAGNOSIS — E11.9 TYPE 2 DIABETES MELLITUS WITHOUT COMPLICATION, WITHOUT LONG-TERM CURRENT USE OF INSULIN: Primary | ICD-10-CM

## 2018-01-11 DIAGNOSIS — E78.5 HYPERLIPIDEMIA, UNSPECIFIED HYPERLIPIDEMIA TYPE: ICD-10-CM

## 2018-01-11 DIAGNOSIS — I10 ESSENTIAL HYPERTENSION: ICD-10-CM

## 2018-01-11 PROCEDURE — 99214 OFFICE O/P EST MOD 30 MIN: CPT | Mod: S$GLB,,, | Performed by: INTERNAL MEDICINE

## 2018-01-11 RX ORDER — ASPIRIN 81 MG/1
81 TABLET ORAL DAILY
Refills: 0 | COMMUNITY
Start: 2018-01-11 | End: 2018-05-11

## 2018-01-11 NOTE — PROGRESS NOTES
Subjective:       Patient ID: Sergo Hollingsworth is a 57 y.o. male.    @  Medication List with Changes/Refills   New Medications    ASPIRIN (ECOTRIN) 81 MG EC TABLET    Take 1 tablet (81 mg total) by mouth once daily.    SITAGLIPTIN (JANUVIA) 100 MG TAB    Take 1 tablet (100 mg total) by mouth once daily.   Current Medications    AMLODIPINE-BENAZEPRIL 5-20 MG (LOTREL) 5-20 MG PER CAPSULE    Take 1 capsule by mouth once daily.    ATORVASTATIN (LIPITOR) 10 MG TABLET    Take 1 tablet (10 mg total) by mouth once daily.    METFORMIN (GLUCOPHAGE) 1000 MG TABLET    Take 1 tablet (1,000 mg total) by mouth daily with breakfast.    TRIAMTERENE-HYDROCHLOROTHIAZIDE 37.5-25 MG (DYAZIDE) 37.5-25 MG PER CAPSULE    Take 1 capsule by mouth every morning.   Discontinued Medications    SITAGLIPTIN (JANUVIA) 50 MG TAB    Take 1 tablet (50 mg total) by mouth once daily.       Chief Complaint: Annual Exam and Results  He is here today to f/u on chronic medical issues.      He has hypertension dx since 2006. He says his BP has been elevated since high school. He has no known CAD. He is taking combination benazepril-amlodipine 20-5 mg qday and triamterene-HCTZ 37.5-25 mg qday. He does not check his BP at home. He denies chest pain or shortness of breath.       He has diabetes and is taking metformin 1000 mg twice daily and januvia 50 mg qday.  His last HbA1c is 7.9  on 1/2018 (which is up from 7.6). He had an eye exam in 6/2017and his foot exam is due today.  His microalbumin is negative on 1/2018 and he is taking an ACEI.  He denies any neuropathy or vision issues.      He has hyperlipidemia and is on atorvastatin 10 mg qday with last lipids on 4/2017 were 126/116/49/53. He does take an aspirin daily.      He stays very active. He is not careful with his diet due to holidays and he does not exercise.       Colonoscopy----6/2017 repeat in 10 years  Tdap---4/2017  Pneumovax--4/2017  Influenza vaccine---refuses  Shingles  "vaccine---none    Review of Systems   Constitutional: Negative for appetite change, fatigue, fever and unexpected weight change.   HENT: Negative for congestion, ear pain, hearing loss, sore throat and trouble swallowing.    Eyes: Negative for pain and visual disturbance.   Respiratory: Negative for cough, chest tightness, shortness of breath and wheezing.    Cardiovascular: Negative for chest pain, palpitations and leg swelling.   Gastrointestinal: Positive for constipation and diarrhea. Negative for abdominal pain, blood in stool, nausea and vomiting.   Endocrine: Negative for polyuria.   Genitourinary: Negative for dysuria and hematuria.   Musculoskeletal: Positive for back pain. Negative for arthralgias and myalgias.   Skin: Negative for rash.   Neurological: Negative for dizziness, weakness, numbness and headaches.   Hematological: Does not bruise/bleed easily.   Psychiatric/Behavioral: Negative for dysphoric mood, sleep disturbance and suicidal ideas. The patient is not nervous/anxious.        Objective:      Vitals:    01/11/18 0802   BP: 134/86   BP Location: Right arm   Patient Position: Sitting   BP Method: Large (Manual)   Pulse: 87   Resp: 17   Temp: 98.2 °F (36.8 °C)   TempSrc: Oral   SpO2: 98%   Weight: 113.5 kg (250 lb 3.6 oz)   Height: 5' 9" (1.753 m)     Body mass index is 36.95 kg/m².  Physical Exam    General appearance: No acute distress, cooperative  Neck: FROM, soft, supple, no thyromegaly, no bruits  Lymph: no anterior or posterior cervical adenopathy  Heart::  Regular rate and rhythm, no murmur  Lung: Clear to ascultation bilaterally, no wheezing, no rales, no rhonchi, no distress  Abdomen: Soft, nontender, no distention, no hepatosplenomegaly, bowel sounds normal, no guarding, no rebound, no peritoneal signs  Skin: no rashes, no lesions  Extremities: no edema, no cyanosis  Diabetic foot exam:   Left: Pulses: 2+ pedal pulses   Sensation: normal   Filament test present   Apperance: no ulcers, " heel callous formation, no deformities, no onychomycosis, no thickened nails   Right: Pulses: 2+ pedal pulses   Sensation: normal   Filament test present   Appearance: no ulcers, heel callous formation, no deformities, no onychomycosis, no thickened nails  Neuro: no focal abnormalities, strength 5/5 b/l UE and LE, 2+ DTRs b/l UE and LE, normal gait  Peripheral pulses: 2+ pedal pulses bilaterally, good perfusion and color  Musculoskeletal: FROM, good strenth, no tenderness  Joint: normal appearance, no swelling, no warmth, no deformity in all joints    Assessment:       1. Type 2 diabetes mellitus without complication, without long-term current use of insulin    2. Essential hypertension    3. Hyperlipidemia, unspecified hyperlipidemia type    4. Class 2 obesity with serious comorbidity and body mass index (BMI) of 36.0 to 36.9 in adult, unspecified obesity type        Plan:       Type 2 diabetes mellitus without complication, without long-term current use of insulin  Uncontrolled despite increasing doses of januvia. I feel his diet plays a large role.  Will increase januvia to 100 mg qday and continue metformin 1000 mg bid.  To consider changing to victoza if januvia is not helping.  Will recheck HbA1c in 4 months.  Foot exam done today. He is UTD with his eye exam.  His repeat microalbumin is negative.   -     Basic metabolic panel; Future; Expected date: 04/11/2018  -     Hemoglobin A1c; Future; Expected date: 04/11/2018  -     SITagliptin (JANUVIA) 100 MG Tab; Take 1 tablet (100 mg total) by mouth once daily.  Dispense: 90 tablet; Refill: 3    Essential hypertension  Good control on this regimen.    -     TSH; Future; Expected date: 04/11/2018  -     CBC auto differential; Future; Expected date: 04/11/2018    Hyperlipidemia, unspecified hyperlipidemia type  Good control on low dose atorvastatin and he is on aspirin daily.   -     Lipid panel; Future; Expected date: 04/11/2018  -     aspirin (ECOTRIN) 81 MG EC  tablet; Take 1 tablet (81 mg total) by mouth once daily.; Refill: 0    Class 2 obesity with serious comorbidity and body mass index (BMI) of 36.0 to 36.9 in adult, unspecified obesity type  Long discussion on the need for regular exercise and healthy eating to help control his diabetes and lose weight.     Follow-up in about 4 months (around 5/11/2018) for chronic medical issues.

## 2018-01-12 ENCOUNTER — PATIENT MESSAGE (OUTPATIENT)
Dept: FAMILY MEDICINE | Facility: CLINIC | Age: 58
End: 2018-01-12

## 2018-01-12 DIAGNOSIS — E11.9 TYPE 2 DIABETES MELLITUS WITHOUT COMPLICATION, WITHOUT LONG-TERM CURRENT USE OF INSULIN: ICD-10-CM

## 2018-01-19 DIAGNOSIS — E11.9 TYPE 2 DIABETES MELLITUS WITHOUT COMPLICATION, WITHOUT LONG-TERM CURRENT USE OF INSULIN: ICD-10-CM

## 2018-01-19 NOTE — TELEPHONE ENCOUNTER
----- Message from Hans Castellanos sent at 1/19/2018  2:03 PM CST -----  Contact: pt  Pt is requesting a new prescription for his SITagliptin (JANUVIA) 100 MG Tab  Call Back#[phone]  Thanks    [facility] - [place], [place] - [address] [place] [id number]  Phone: [phone] Fax: [phone]

## 2018-01-30 DIAGNOSIS — E11.9 TYPE 2 DIABETES MELLITUS WITHOUT COMPLICATION, WITHOUT LONG-TERM CURRENT USE OF INSULIN: ICD-10-CM

## 2018-02-13 ENCOUNTER — PATIENT MESSAGE (OUTPATIENT)
Dept: FAMILY MEDICINE | Facility: CLINIC | Age: 58
End: 2018-02-13

## 2018-02-13 DIAGNOSIS — E11.9 TYPE 2 DIABETES MELLITUS WITHOUT COMPLICATION, WITHOUT LONG-TERM CURRENT USE OF INSULIN: ICD-10-CM

## 2018-02-15 RX ORDER — METFORMIN HYDROCHLORIDE 1000 MG/1
1000 TABLET ORAL
Qty: 90 TABLET | Refills: 3 | Status: SHIPPED | OUTPATIENT
Start: 2018-02-15 | End: 2018-05-11 | Stop reason: SDUPTHER

## 2018-02-17 NOTE — TELEPHONE ENCOUNTER
----- Message from Waleska Sanders sent at 6/8/2017  8:15 AM CDT -----  Contact: [phone]- Self  Patient called stating he is scheduled for a procedure tomorrow. Patient states he has not received prep for the test. Call was placed to the pod.Thanks!     No

## 2018-04-27 ENCOUNTER — PATIENT OUTREACH (OUTPATIENT)
Dept: ADMINISTRATIVE | Facility: HOSPITAL | Age: 58
End: 2018-04-27

## 2018-04-27 NOTE — PROGRESS NOTES
Health Maintenance Due   Topic Date Due    Influenza Vaccine  08/01/2017    Zoster Vaccine  02/14/2018

## 2018-05-07 ENCOUNTER — LAB VISIT (OUTPATIENT)
Dept: LAB | Facility: HOSPITAL | Age: 58
End: 2018-05-07
Attending: INTERNAL MEDICINE
Payer: COMMERCIAL

## 2018-05-07 DIAGNOSIS — I10 ESSENTIAL HYPERTENSION: ICD-10-CM

## 2018-05-07 DIAGNOSIS — E11.9 TYPE 2 DIABETES MELLITUS WITHOUT COMPLICATION, WITHOUT LONG-TERM CURRENT USE OF INSULIN: ICD-10-CM

## 2018-05-07 DIAGNOSIS — E78.5 HYPERLIPIDEMIA, UNSPECIFIED HYPERLIPIDEMIA TYPE: ICD-10-CM

## 2018-05-07 LAB
ANION GAP SERPL CALC-SCNC: 9 MMOL/L
BASOPHILS # BLD AUTO: 0.06 K/UL
BASOPHILS NFR BLD: 0.9 %
BUN SERPL-MCNC: 14 MG/DL
CALCIUM SERPL-MCNC: 9.2 MG/DL
CHLORIDE SERPL-SCNC: 103 MMOL/L
CHOLEST SERPL-MCNC: 118 MG/DL
CHOLEST/HDLC SERPL: 2.4 {RATIO}
CO2 SERPL-SCNC: 27 MMOL/L
CREAT SERPL-MCNC: 1.1 MG/DL
DIFFERENTIAL METHOD: ABNORMAL
EOSINOPHIL # BLD AUTO: 0.1 K/UL
EOSINOPHIL NFR BLD: 1.7 %
ERYTHROCYTE [DISTWIDTH] IN BLOOD BY AUTOMATED COUNT: 12.7 %
EST. GFR  (AFRICAN AMERICAN): >60 ML/MIN/1.73 M^2
EST. GFR  (NON AFRICAN AMERICAN): >60 ML/MIN/1.73 M^2
ESTIMATED AVG GLUCOSE: 192 MG/DL
GLUCOSE SERPL-MCNC: 193 MG/DL
HBA1C MFR BLD HPLC: 8.3 %
HCT VFR BLD AUTO: 41.8 %
HDLC SERPL-MCNC: 49 MG/DL
HDLC SERPL: 41.5 %
HGB BLD-MCNC: 14.1 G/DL
IMM GRANULOCYTES # BLD AUTO: 0.03 K/UL
IMM GRANULOCYTES NFR BLD AUTO: 0.5 %
LDLC SERPL CALC-MCNC: 49.8 MG/DL
LYMPHOCYTES # BLD AUTO: 1.8 K/UL
LYMPHOCYTES NFR BLD: 26.3 %
MCH RBC QN AUTO: 30.7 PG
MCHC RBC AUTO-ENTMCNC: 33.7 G/DL
MCV RBC AUTO: 91 FL
MONOCYTES # BLD AUTO: 0.6 K/UL
MONOCYTES NFR BLD: 9.3 %
NEUTROPHILS # BLD AUTO: 4.1 K/UL
NEUTROPHILS NFR BLD: 61.3 %
NONHDLC SERPL-MCNC: 69 MG/DL
NRBC BLD-RTO: 0 /100 WBC
PLATELET # BLD AUTO: 286 K/UL
PMV BLD AUTO: 10.6 FL
POTASSIUM SERPL-SCNC: 3.9 MMOL/L
RBC # BLD AUTO: 4.59 M/UL
SODIUM SERPL-SCNC: 139 MMOL/L
TRIGL SERPL-MCNC: 96 MG/DL
TSH SERPL DL<=0.005 MIU/L-ACNC: 1.32 UIU/ML
WBC # BLD AUTO: 6.65 K/UL

## 2018-05-07 PROCEDURE — 80048 BASIC METABOLIC PNL TOTAL CA: CPT

## 2018-05-07 PROCEDURE — 80061 LIPID PANEL: CPT

## 2018-05-07 PROCEDURE — 85025 COMPLETE CBC W/AUTO DIFF WBC: CPT

## 2018-05-07 PROCEDURE — 84443 ASSAY THYROID STIM HORMONE: CPT

## 2018-05-07 PROCEDURE — 83036 HEMOGLOBIN GLYCOSYLATED A1C: CPT

## 2018-05-07 PROCEDURE — 36415 COLL VENOUS BLD VENIPUNCTURE: CPT | Mod: PO

## 2018-05-11 ENCOUNTER — OFFICE VISIT (OUTPATIENT)
Dept: FAMILY MEDICINE | Facility: CLINIC | Age: 58
End: 2018-05-11
Payer: COMMERCIAL

## 2018-05-11 VITALS
TEMPERATURE: 98 F | OXYGEN SATURATION: 98 % | WEIGHT: 251.19 LBS | HEIGHT: 69 IN | SYSTOLIC BLOOD PRESSURE: 120 MMHG | HEART RATE: 78 BPM | DIASTOLIC BLOOD PRESSURE: 82 MMHG | BODY MASS INDEX: 37.2 KG/M2 | RESPIRATION RATE: 20 BRPM

## 2018-05-11 DIAGNOSIS — E11.9 TYPE 2 DIABETES MELLITUS WITHOUT COMPLICATION, WITHOUT LONG-TERM CURRENT USE OF INSULIN: Primary | ICD-10-CM

## 2018-05-11 DIAGNOSIS — I10 ESSENTIAL HYPERTENSION: ICD-10-CM

## 2018-05-11 DIAGNOSIS — E78.5 HYPERLIPIDEMIA, UNSPECIFIED HYPERLIPIDEMIA TYPE: ICD-10-CM

## 2018-05-11 PROCEDURE — 99214 OFFICE O/P EST MOD 30 MIN: CPT | Mod: S$GLB,,, | Performed by: INTERNAL MEDICINE

## 2018-05-11 PROCEDURE — 3045F PR MOST RECENT HEMOGLOBIN A1C LEVEL 7.0-9.0%: CPT | Mod: CPTII,S$GLB,, | Performed by: INTERNAL MEDICINE

## 2018-05-11 PROCEDURE — 3074F SYST BP LT 130 MM HG: CPT | Mod: CPTII,S$GLB,, | Performed by: INTERNAL MEDICINE

## 2018-05-11 PROCEDURE — 3008F BODY MASS INDEX DOCD: CPT | Mod: CPTII,S$GLB,, | Performed by: INTERNAL MEDICINE

## 2018-05-11 PROCEDURE — 3079F DIAST BP 80-89 MM HG: CPT | Mod: CPTII,S$GLB,, | Performed by: INTERNAL MEDICINE

## 2018-05-11 RX ORDER — METFORMIN HYDROCHLORIDE 1000 MG/1
1000 TABLET ORAL 2 TIMES DAILY WITH MEALS
Qty: 180 TABLET | Refills: 3 | Status: SHIPPED | OUTPATIENT
Start: 2018-05-11 | End: 2018-09-13 | Stop reason: SDUPTHER

## 2018-05-11 NOTE — PROGRESS NOTES
Subjective:       Patient ID: Sergo Hollingsworth is a 58 y.o. male.    Medication List with Changes/Refills   Current Medications    AMLODIPINE-BENAZEPRIL 5-20 MG (LOTREL) 5-20 MG PER CAPSULE    Take 1 capsule by mouth once daily.    ATORVASTATIN (LIPITOR) 10 MG TABLET    Take 1 tablet (10 mg total) by mouth once daily.    SITAGLIPTIN (JANUVIA) 100 MG TAB    Take 1 tablet (100 mg total) by mouth once daily.    TRIAMTERENE-HYDROCHLOROTHIAZIDE 37.5-25 MG (DYAZIDE) 37.5-25 MG PER CAPSULE    Take 1 capsule by mouth every morning.   Changed and/or Refilled Medications    Modified Medication Previous Medication    METFORMIN (GLUCOPHAGE) 1000 MG TABLET metFORMIN (GLUCOPHAGE) 1000 MG tablet       Take 1 tablet (1,000 mg total) by mouth 2 (two) times daily with meals.    Take 1 tablet (1,000 mg total) by mouth daily with breakfast.   Discontinued Medications    ASPIRIN (ECOTRIN) 81 MG EC TABLET    Take 1 tablet (81 mg total) by mouth once daily.       Chief Complaint: Follow-up  He is here today to f/u on chronic medical issues.      He has hypertension dx since 2006. He says his BP has been elevated since high school. He has no known CAD. He is taking combination benazepril-amlodipine 20-5 mg qday and triamterene-HCTZ 37.5-25 mg qday. He does not check his BP at home. He denies chest pain or shortness of breath.       He has diabetes and is taking metformin 1000 mg twice daily and januvia 100 mg qday.  His last HbA1c is 8.3 on 5/2018 (he had run out of medication for at least 1 1/2 months over the last 3 months).  His eye and foot exam are UTD.   His microalbumin is negative on 1/2018 and he is taking an ACEI.  He denies any neuropathy or vision issues.      He has hyperlipidemia and is on atorvastatin 10 mg qday with last lipids on 5/2018 were 118/96/49/49.  He does take an aspirin daily.      He stays very active. He is not careful with his diet due to holidays and he does not exercise.  Over the last couple months he has  "been very stressed which is why he ran out of medication for 6 weeks.       Colonoscopy----6/2017 repeat in 10 years  Tdap---4/2017  Pneumovax--4/2017  Influenza vaccine---refuses  Shingles vaccine---none    Review of Systems   Constitutional: Negative for appetite change, fatigue, fever and unexpected weight change.   HENT: Negative for congestion, ear pain, hearing loss, sore throat and trouble swallowing.    Eyes: Negative for pain and visual disturbance.   Respiratory: Negative for cough, chest tightness, shortness of breath and wheezing.    Cardiovascular: Negative for chest pain, palpitations and leg swelling.   Gastrointestinal: Positive for diarrhea. Negative for abdominal pain, blood in stool, constipation, nausea and vomiting.   Endocrine: Negative for polyuria.   Genitourinary: Negative for dysuria and hematuria.   Musculoskeletal: Positive for arthralgias. Negative for back pain and myalgias.   Skin: Negative for rash.   Neurological: Negative for dizziness, weakness, numbness and headaches.   Hematological: Does not bruise/bleed easily.   Psychiatric/Behavioral: Positive for dysphoric mood and sleep disturbance. Negative for suicidal ideas. The patient is not nervous/anxious.        Objective:      Vitals:    05/11/18 0751   BP: 120/82   BP Location: Left arm   Patient Position: Sitting   BP Method: Large (Manual)   Pulse: 78   Resp: 20   Temp: 97.9 °F (36.6 °C)   TempSrc: Oral   SpO2: 98%   Weight: 113.9 kg (251 lb 3.2 oz)   Height: 5' 9" (1.753 m)     Body mass index is 37.1 kg/m².  Physical Exam    General appearance: No acute distress, cooperative  Neck: FROM, soft, supple, no thyromegaly, no bruits  Lymph: no anterior or posterior cervical adenopathy  Heart::  Regular rate and rhythm, no murmur  Lung: Clear to ascultation bilaterally, no wheezing, no rales, no rhonchi, no distress  Abdomen: Soft, nontender, no distention, no hepatosplenomegaly, bowel sounds normal, no guarding, no rebound, no " peritoneal signs  Skin: no rashes, no lesions  Extremities: no edema, no cyanosis  Neuro: no focal abnormalities, strength 5/5 b/l UE and LE, 2+ DTRs b/l UE and LE, normal gait  Peripheral pulses: 2+ pedal pulses bilaterally, good perfusion and color  Musculoskeletal: FROM, good strenth, no tenderness  Joint: normal appearance, no swelling, no warmth, no deformity in all joints    Assessment:       1. Type 2 diabetes mellitus without complication, without long-term current use of insulin    2. Essential hypertension    3. Hyperlipidemia, unspecified hyperlipidemia type    4. Class 2 obesity with serious comorbidity and body mass index (BMI) of 36.0 to 36.9 in adult, unspecified obesity type        Plan:       Type 2 diabetes mellitus without complication, without long-term current use of insulin  Uncontrolled. I recommended to stop januvia and give a trial of victoza. He truly feels his HbA1c is elevated because he was out of januvia so wants to try for 3 months to take medication correctly and recheck HbA1c. I agreed but did stress if still out of control he is working towards nighlty insulin. He is UTD with his foot and eye exam.   -     metFORMIN (GLUCOPHAGE) 1000 MG tablet; Take 1 tablet (1,000 mg total) by mouth 2 (two) times daily with meals.  Dispense: 180 tablet; Refill: 3  -     Hemoglobin A1c; Future; Expected date: 08/11/2018    Essential hypertension  Good control on this regimen.     Hyperlipidemia, unspecified hyperlipidemia type  Good control on atorvastatin. He is taking aspirin daily.     Class 2 obesity with serious comorbidity and body mass index (BMI) of 36.0 to 36.9 in adult, unspecified obesity type  Long discussion about healthy eating and need for regular exercise to control his diabetes and weight loss.     Follow-up in about 3 months (around 8/11/2018) for chronic medical issues.

## 2018-07-07 ENCOUNTER — PATIENT MESSAGE (OUTPATIENT)
Dept: FAMILY MEDICINE | Facility: CLINIC | Age: 58
End: 2018-07-07

## 2018-07-09 ENCOUNTER — PATIENT MESSAGE (OUTPATIENT)
Dept: FAMILY MEDICINE | Facility: CLINIC | Age: 58
End: 2018-07-09

## 2018-07-09 DIAGNOSIS — E11.9 TYPE 2 DIABETES MELLITUS WITHOUT COMPLICATION, WITHOUT LONG-TERM CURRENT USE OF INSULIN: Primary | ICD-10-CM

## 2018-07-09 NOTE — TELEPHONE ENCOUNTER
Are you familiar with this brand glucometer ? If not I will tell pt that he needs to call his insurance to see which glucometer is covered .--lp

## 2018-07-12 RX ORDER — INSULIN PUMP SYRINGE, 3 ML
EACH MISCELLANEOUS
Qty: 1 EACH | Refills: 0 | Status: SHIPPED | OUTPATIENT
Start: 2018-07-12 | End: 2023-10-30

## 2018-07-13 DIAGNOSIS — E11.9 TYPE 2 DIABETES MELLITUS WITHOUT COMPLICATION, UNSPECIFIED WHETHER LONG TERM INSULIN USE: ICD-10-CM

## 2018-07-27 ENCOUNTER — PATIENT OUTREACH (OUTPATIENT)
Dept: ADMINISTRATIVE | Facility: HOSPITAL | Age: 58
End: 2018-07-27

## 2018-08-02 ENCOUNTER — LAB VISIT (OUTPATIENT)
Dept: LAB | Facility: HOSPITAL | Age: 58
End: 2018-08-02
Attending: INTERNAL MEDICINE
Payer: COMMERCIAL

## 2018-08-02 DIAGNOSIS — E11.9 TYPE 2 DIABETES MELLITUS WITHOUT COMPLICATION, WITHOUT LONG-TERM CURRENT USE OF INSULIN: ICD-10-CM

## 2018-08-02 LAB
ESTIMATED AVG GLUCOSE: 157 MG/DL
HBA1C MFR BLD HPLC: 7.1 %

## 2018-08-02 PROCEDURE — 83036 HEMOGLOBIN GLYCOSYLATED A1C: CPT

## 2018-08-02 PROCEDURE — 36415 COLL VENOUS BLD VENIPUNCTURE: CPT | Mod: PO

## 2018-08-03 ENCOUNTER — PATIENT MESSAGE (OUTPATIENT)
Dept: FAMILY MEDICINE | Facility: CLINIC | Age: 58
End: 2018-08-03

## 2018-08-10 ENCOUNTER — CLINICAL SUPPORT (OUTPATIENT)
Dept: FAMILY MEDICINE | Facility: CLINIC | Age: 58
End: 2018-08-10
Attending: INTERNAL MEDICINE
Payer: COMMERCIAL

## 2018-08-10 ENCOUNTER — OFFICE VISIT (OUTPATIENT)
Dept: FAMILY MEDICINE | Facility: CLINIC | Age: 58
End: 2018-08-10
Payer: COMMERCIAL

## 2018-08-10 VITALS
DIASTOLIC BLOOD PRESSURE: 84 MMHG | HEART RATE: 90 BPM | SYSTOLIC BLOOD PRESSURE: 126 MMHG | HEIGHT: 69 IN | WEIGHT: 250.38 LBS | TEMPERATURE: 98 F | BODY MASS INDEX: 37.08 KG/M2 | OXYGEN SATURATION: 97 % | RESPIRATION RATE: 16 BRPM

## 2018-08-10 DIAGNOSIS — Z72.0 CHEWS TOBACCO: ICD-10-CM

## 2018-08-10 DIAGNOSIS — E11.9 TYPE 2 DIABETES MELLITUS WITHOUT COMPLICATION, WITHOUT LONG-TERM CURRENT USE OF INSULIN: Primary | ICD-10-CM

## 2018-08-10 DIAGNOSIS — E78.5 HYPERLIPIDEMIA, UNSPECIFIED HYPERLIPIDEMIA TYPE: ICD-10-CM

## 2018-08-10 DIAGNOSIS — I10 ESSENTIAL HYPERTENSION: ICD-10-CM

## 2018-08-10 DIAGNOSIS — E11.9 TYPE 2 DIABETES MELLITUS WITHOUT COMPLICATION, UNSPECIFIED WHETHER LONG TERM INSULIN USE: ICD-10-CM

## 2018-08-10 PROCEDURE — 99999 PR PBB SHADOW E&M-EST. PATIENT-LVL II: CPT | Mod: PBBFAC,,,

## 2018-08-10 PROCEDURE — 3045F PR MOST RECENT HEMOGLOBIN A1C LEVEL 7.0-9.0%: CPT | Mod: CPTII,S$GLB,, | Performed by: INTERNAL MEDICINE

## 2018-08-10 PROCEDURE — 99214 OFFICE O/P EST MOD 30 MIN: CPT | Mod: S$GLB,,, | Performed by: INTERNAL MEDICINE

## 2018-08-10 PROCEDURE — 92250 FUNDUS PHOTOGRAPHY W/I&R: CPT | Mod: S$GLB,,, | Performed by: OPTOMETRIST

## 2018-08-10 PROCEDURE — 3079F DIAST BP 80-89 MM HG: CPT | Mod: CPTII,S$GLB,, | Performed by: INTERNAL MEDICINE

## 2018-08-10 PROCEDURE — 3008F BODY MASS INDEX DOCD: CPT | Mod: CPTII,S$GLB,, | Performed by: INTERNAL MEDICINE

## 2018-08-10 PROCEDURE — 3074F SYST BP LT 130 MM HG: CPT | Mod: CPTII,S$GLB,, | Performed by: INTERNAL MEDICINE

## 2018-08-10 RX ORDER — ASPIRIN 81 MG/1
81 TABLET ORAL DAILY
Refills: 0 | COMMUNITY
Start: 2018-08-10 | End: 2023-11-06

## 2018-08-10 NOTE — PROGRESS NOTES
Assessment /Plan     For exam results, see Encounter Report.    Type 2 diabetes mellitus without complication, unspecified whether long term insulin use  -     Diabetic Eye Screening Photo

## 2018-08-10 NOTE — PROGRESS NOTES
Subjective:       Patient ID: Sergo Hollingsworth is a 58 y.o. male.    Medication List with Changes/Refills   New Medications    ASPIRIN (ECOTRIN) 81 MG EC TABLET    Take 1 tablet (81 mg total) by mouth once daily.   Current Medications    AMLODIPINE-BENAZEPRIL 5-20 MG (LOTREL) 5-20 MG PER CAPSULE    Take 1 capsule by mouth once daily.    ATORVASTATIN (LIPITOR) 10 MG TABLET    Take 1 tablet (10 mg total) by mouth once daily.    BLOOD-GLUCOSE METER (BLOOD GLUCOSE MONITORING) KIT    Use as instructed.  Free Style Whit    METFORMIN (GLUCOPHAGE) 1000 MG TABLET    Take 1 tablet (1,000 mg total) by mouth 2 (two) times daily with meals.    SITAGLIPTIN (JANUVIA) 100 MG TAB    Take 1 tablet (100 mg total) by mouth once daily.    TRIAMTERENE-HYDROCHLOROTHIAZIDE 37.5-25 MG (DYAZIDE) 37.5-25 MG PER CAPSULE    Take 1 capsule by mouth every morning.   Discontinued Medications    [place] [facility] [place]        FREESTYLE WHIT SENSOR KIT           Chief Complaint: Follow-up  He is here today to f/u on chronic medical issues.      He has hypertension dx since 2006. He says his BP has been elevated since high school. He has no known CAD. He is taking combination benazepril-amlodipine 20-5 mg qday and triamterene-HCTZ 37.5-25 mg qday. He does not check his BP at home. He denies chest pain or shortness of breath.       He has diabetes and is taking metformin 1000 mg twice daily and januvia 100 mg qday. His most recent HbA1c was 7.1 on 8/2018 now that he restarted taking his medication.  His  foot exam ais UTD and he is going for his eye exam today.   His microalbumin is negative on 1/2018 and he is taking an ACEI.  He denies any neuropathy or vision issues.      He has hyperlipidemia and is on atorvastatin 10 mg qday with last lipids on 5/2018 were 118/96/49/49.  He does take an aspirin daily.      He stays very active. He is not careful with his diet due to holidays and he does not exercise.  He continues to chew tobacco and does  "not want to quit.      Colonoscopy----6/2017 repeat in 10 years  Tdap---4/2017  Pneumovax--4/2017  Influenza vaccine---refuses  Shingles vaccine---none    Review of Systems   Constitutional: Negative for appetite change, fatigue, fever and unexpected weight change.   HENT: Negative for congestion, ear pain, hearing loss, sore throat and trouble swallowing.    Eyes: Negative for pain and visual disturbance.   Respiratory: Negative for cough, chest tightness, shortness of breath and wheezing.    Cardiovascular: Negative for chest pain, palpitations and leg swelling.   Gastrointestinal: Negative for abdominal pain, blood in stool, constipation, diarrhea, nausea and vomiting.   Endocrine: Negative for polydipsia, polyphagia and polyuria.   Genitourinary: Negative for dysuria and hematuria.   Musculoskeletal: Negative for arthralgias, back pain and myalgias.   Skin: Negative for pallor and rash.   Neurological: Negative for dizziness, tremors, seizures, speech difficulty, weakness, numbness and headaches.   Hematological: Does not bruise/bleed easily.   Psychiatric/Behavioral: Negative for confusion, dysphoric mood, sleep disturbance and suicidal ideas. The patient is not nervous/anxious.        Objective:      Vitals:    08/10/18 0822   BP: 126/84   Pulse: 90   Resp: 16   Temp: 97.6 °F (36.4 °C)   TempSrc: Oral   SpO2: 97%   Weight: 113.6 kg (250 lb 6.4 oz)   Height: 5' 9" (1.753 m)     Body mass index is 36.98 kg/m².  Physical Exam    General appearance: No acute distress, cooperative  Neck: FROM, soft, supple, no thyromegaly, no bruits  Lymph: no anterior or posterior cervical adenopathy  Heart::  Regular rate and rhythm, no murmur  Lung: Clear to ascultation bilaterally, no wheezing, no rales, no rhonchi, no distress  Abdomen: Soft, nontender, no distention, no hepatosplenomegaly, bowel sounds normal, no guarding, no rebound, no peritoneal signs  Skin: no rashes, no lesions  Extremities: no edema, no cyanosis  Neuro: " no focal abnormalities, strength 5/5 b/l UE and LE, 2+ DTRs b/l UE and LE, normal gait  Peripheral pulses: 2+ pedal pulses bilaterally, good perfusion and color  Musculoskeletal: FROM, good strenth, no tenderness  Joint: normal appearance, no swelling, no warmth, no deformity in all joints    Assessment:       1. Type 2 diabetes mellitus without complication, without long-term current use of insulin    2. Essential hypertension    3. Hyperlipidemia, unspecified hyperlipidemia type    4. Class 2 obesity with serious comorbidity and body mass index (BMI) of 36.0 to 36.9 in adult, unspecified obesity type    5. Chews tobacco        Plan:       Type 2 diabetes mellitus without complication, without long-term current use of insulin  Much improved now that he is compliant with his medication. He is going for an eye exam today. Foot exam is UTD. Microalbumin is neg and he is on an ACEI.   -     Hemoglobin A1c; Future; Expected date: 11/10/2018    Essential hypertension  Good control on this regimen.     Hyperlipidemia, unspecified hyperlipidemia type  Good control on atorvastatin. Advised to take aspirin daily.   -     aspirin (ECOTRIN) 81 MG EC tablet; Take 1 tablet (81 mg total) by mouth once daily.; Refill: 0    Class 2 obesity with serious comorbidity and body mass index (BMI) of 36.0 to 36.9 in adult, unspecified obesity type  Encouraged healthy eating and regular exercise to control his diabetes and lose weight.     Chews tobacco  Strongly advised to stop chewing tobacco but he does not want to quit at this time.     Follow-up in about 4 months (around 12/10/2018) for chronic medical issues.

## 2018-08-12 ENCOUNTER — PATIENT MESSAGE (OUTPATIENT)
Dept: FAMILY MEDICINE | Facility: CLINIC | Age: 58
End: 2018-08-12

## 2018-09-12 ENCOUNTER — PATIENT MESSAGE (OUTPATIENT)
Dept: FAMILY MEDICINE | Facility: CLINIC | Age: 58
End: 2018-09-12

## 2018-09-12 DIAGNOSIS — E11.9 TYPE 2 DIABETES MELLITUS WITHOUT COMPLICATION, WITHOUT LONG-TERM CURRENT USE OF INSULIN: ICD-10-CM

## 2018-09-13 NOTE — TELEPHONE ENCOUNTER
----- Message from Chelsey Sandoval sent at 9/12/2018  3:49 PM CDT -----  Contact: Shari  Type:  RX Refill Request    Who Called:  Patient's wife  Refill or New Rx:  refill  RX Name and Strength:  Medication refills  How is the patient currently taking it? (ex. 1XDay):    Is this a 30 day or 90 day RX:    Pref[facility] Pharm[facility] with phone number[facility]  Local or Mail Order:  Mail order  Ordering Provider:    Best Call Back Number:  [phone]  Additional Information: didn't know the names of the medications call back

## 2018-09-14 RX ORDER — METFORMIN HYDROCHLORIDE 1000 MG/1
1000 TABLET ORAL 2 TIMES DAILY WITH MEALS
Qty: 180 TABLET | Refills: 3 | Status: SHIPPED | OUTPATIENT
Start: 2018-09-14 | End: 2019-11-14 | Stop reason: SDUPTHER

## 2018-09-14 RX ORDER — AMLODIPINE AND BENAZEPRIL HYDROCHLORIDE 5; 20 MG/1; MG/1
1 CAPSULE ORAL DAILY
Qty: 90 CAPSULE | Refills: 3 | Status: SHIPPED | OUTPATIENT
Start: 2018-09-14 | End: 2019-07-26 | Stop reason: SDUPTHER

## 2018-09-14 RX ORDER — TRIAMTERENE AND HYDROCHLOROTHIAZIDE 37.5; 25 MG/1; MG/1
1 CAPSULE ORAL EVERY MORNING
Qty: 90 CAPSULE | Refills: 2 | Status: SHIPPED | OUTPATIENT
Start: 2018-09-14 | End: 2019-07-26 | Stop reason: SDUPTHER

## 2018-09-14 RX ORDER — ATORVASTATIN CALCIUM 10 MG/1
10 TABLET, FILM COATED ORAL DAILY
Qty: 90 TABLET | Refills: 3 | Status: SHIPPED | OUTPATIENT
Start: 2018-09-14 | End: 2019-11-08 | Stop reason: SDUPTHER

## 2018-10-23 RX ORDER — ONDANSETRON 8 MG/1
8 TABLET, ORALLY DISINTEGRATING ORAL EVERY 6 HOURS PRN
Qty: 30 TABLET | Refills: 0 | Status: SHIPPED | OUTPATIENT
Start: 2018-10-23 | End: 2019-07-26 | Stop reason: ALTCHOICE

## 2018-11-30 ENCOUNTER — PATIENT OUTREACH (OUTPATIENT)
Dept: ADMINISTRATIVE | Facility: HOSPITAL | Age: 58
End: 2018-11-30

## 2018-12-13 ENCOUNTER — LAB VISIT (OUTPATIENT)
Dept: LAB | Facility: HOSPITAL | Age: 58
End: 2018-12-13
Attending: INTERNAL MEDICINE
Payer: COMMERCIAL

## 2018-12-13 DIAGNOSIS — E11.9 TYPE 2 DIABETES MELLITUS WITHOUT COMPLICATION, WITHOUT LONG-TERM CURRENT USE OF INSULIN: ICD-10-CM

## 2018-12-13 LAB
ESTIMATED AVG GLUCOSE: 183 MG/DL
HBA1C MFR BLD HPLC: 8 %

## 2018-12-13 PROCEDURE — 36415 COLL VENOUS BLD VENIPUNCTURE: CPT | Mod: PO

## 2018-12-13 PROCEDURE — 83036 HEMOGLOBIN GLYCOSYLATED A1C: CPT

## 2018-12-14 ENCOUNTER — OFFICE VISIT (OUTPATIENT)
Dept: FAMILY MEDICINE | Facility: CLINIC | Age: 58
End: 2018-12-14
Payer: COMMERCIAL

## 2018-12-14 VITALS
TEMPERATURE: 98 F | BODY MASS INDEX: 37.15 KG/M2 | HEIGHT: 69 IN | DIASTOLIC BLOOD PRESSURE: 70 MMHG | SYSTOLIC BLOOD PRESSURE: 128 MMHG | WEIGHT: 250.81 LBS | RESPIRATION RATE: 18 BRPM | HEART RATE: 78 BPM

## 2018-12-14 DIAGNOSIS — E78.5 HYPERLIPIDEMIA, UNSPECIFIED HYPERLIPIDEMIA TYPE: ICD-10-CM

## 2018-12-14 DIAGNOSIS — E11.9 TYPE 2 DIABETES MELLITUS WITHOUT COMPLICATION, WITHOUT LONG-TERM CURRENT USE OF INSULIN: Primary | ICD-10-CM

## 2018-12-14 DIAGNOSIS — I10 ESSENTIAL HYPERTENSION: ICD-10-CM

## 2018-12-14 DIAGNOSIS — E66.01 CLASS 2 SEVERE OBESITY DUE TO EXCESS CALORIES WITH SERIOUS COMORBIDITY AND BODY MASS INDEX (BMI) OF 37.0 TO 37.9 IN ADULT: ICD-10-CM

## 2018-12-14 DIAGNOSIS — Z72.0 CHEWS TOBACCO: ICD-10-CM

## 2018-12-14 PROCEDURE — 3078F DIAST BP <80 MM HG: CPT | Mod: CPTII,S$GLB,, | Performed by: INTERNAL MEDICINE

## 2018-12-14 PROCEDURE — 3045F PR MOST RECENT HEMOGLOBIN A1C LEVEL 7.0-9.0%: CPT | Mod: CPTII,S$GLB,, | Performed by: INTERNAL MEDICINE

## 2018-12-14 PROCEDURE — 99214 OFFICE O/P EST MOD 30 MIN: CPT | Mod: S$GLB,,, | Performed by: INTERNAL MEDICINE

## 2018-12-14 PROCEDURE — 3074F SYST BP LT 130 MM HG: CPT | Mod: CPTII,S$GLB,, | Performed by: INTERNAL MEDICINE

## 2018-12-14 PROCEDURE — 3008F BODY MASS INDEX DOCD: CPT | Mod: CPTII,S$GLB,, | Performed by: INTERNAL MEDICINE

## 2018-12-14 NOTE — PROGRESS NOTES
Subjective:       Patient ID: Sergo Hollingsworth is a 58 y.o. male.       Medication List           Accurate as of 12/14/18  8:44 AM. If you have any questions, ask your nurse or doctor.               CONTINUE taking these medications    amlodipine-benazepril 5-20 mg 5-20 mg per capsule  Commonly known as:  LOTREL  Take 1 capsule by mouth once daily.     aspirin 81 MG EC tablet  Commonly known as:  ECOTRIN  Take 1 tablet (81 mg total) by mouth once daily.     atorvastatin 10 MG tablet  Commonly known as:  LIPITOR  Take 1 tablet (10 mg total) by mouth once daily.     blood-glucose meter kit  Commonly known as:  BLOOD GLUCOSE MONITORING  Use as instructed.  Free Style Whit     metFORMIN 1000 MG tablet  Commonly known as:  GLUCOPHAGE  Take 1 tablet (1,000 mg total) by mouth 2 (two) times daily with meals.     ondansetron 8 MG Tbdl  Commonly known as:  ZOFRAN-ODT  Take 1 tablet (8 mg total) by mouth every 6 (six) hours as needed.     SITagliptin 100 MG Tab  Commonly known as:  JANUVIA  Take 1 tablet (100 mg total) by mouth once daily.     triamterene-hydrochlorothiazide 37.5-25 mg 37.5-25 mg per capsule  Commonly known as:  DYAZIDE  Take 1 capsule by mouth every morning.            Chief Complaint: Diabetes (4 month follow up [facility] labs prior: Declined Flu SHot)  He is here today to f/u on chronic medical issues.      He has hypertension dx since 2006. He says his BP has been elevated since high school. He has no known CAD. He is taking combination benazepril-amlodipine 20-5 mg qday and triamterene-HCTZ 37.5-25 mg qday. He does not check his BP at home. He denies chest pain or shortness of breath.       He has diabetes and is taking metformin 1000 mg twice daily and januvia 100 mg qday. His most recent HbA1c was 8.0 on 12/2018.   His  foot exam and eye exam are UTD.  His microalbumin is negative on 1/2018 and he is taking an ACEI.  He denies any neuropathy or vision issues.      He has hyperlipidemia and is  "on atorvastatin 10 mg qday with last lipids on 5/2018 were 118/96/49/49.  He does take an aspirin daily.      He stays very active. He is not careful with his diet due to holidays and he does not exercise.  He continues to chew tobacco and does not want to quit.      Colonoscopy----6/2017 repeat in 10 years  Tdap---4/2017  Pneumovax--4/2017  Influenza vaccine---refuses  Shingles vaccine---none    Review of Systems   Constitutional: Negative for appetite change, fatigue, fever and unexpected weight change.   HENT: Negative for congestion, ear pain, hearing loss, sore throat and trouble swallowing.    Eyes: Negative for pain and visual disturbance.   Respiratory: Negative for cough, chest tightness, shortness of breath and wheezing.    Cardiovascular: Negative for chest pain, palpitations and leg swelling.   Gastrointestinal: Negative for abdominal pain, blood in stool, constipation, diarrhea, nausea and vomiting.   Endocrine: Negative for polyuria.   Genitourinary: Negative for dysuria and hematuria.   Musculoskeletal: Negative for arthralgias, back pain and myalgias.   Skin: Negative for rash.   Neurological: Negative for dizziness, weakness, numbness and headaches.   Hematological: Does not bruise/bleed easily.   Psychiatric/Behavioral: Negative for dysphoric mood, sleep disturbance and suicidal ideas. The patient is not nervous/anxious.        Objective:      Vitals:    12/14/18 0757   BP: 128/70   Pulse: 78   Resp: 18   Temp: 97.8 °F (36.6 °C)   TempSrc: Oral   Weight: 113.8 kg (250 lb 12.8 oz)   Height: 5' 9" (1.753 m)     Body mass index is 37.04 kg/m².  Physical Exam    General appearance: No acute distress, cooperative  Eyes: PERRL, EOMI, conjunctiva clear  HEENT: ears normal, nose without rhinorrhea, normal turbinates,mouth no sores or lesions, throat no erythema or pus  Neck: FROM, soft, supple, no thyromegaly, no bruits  Lymph: no anterior or posterior cervical adenopathy  Heart::  Regular rate and rhythm, " no murmur  Lung: Clear to ascultation bilaterally, no wheezing, no rales, no rhonchi, no distress  Abdomen: Soft, nontender, no distention, no hepatosplenomegaly, bowel sounds normal, no guarding, no rebound, no peritoneal signs  Skin: no rashes, no lesions  Extremities: no edema, no cyanosis  Diabetic foot exam:   Left: Pulses: 2+ pedal pulses   Sensation: normal   Filament test present   Apperance: no ulcers, thick heel callous formation, no deformities, yes onychomycosis, yes thickened nails   Right: Pulses: 2+ pedal pulses   Sensation: normal   Filament test present   Appearance: no ulcers, thick heel callous formation, no deformities, yes onychomycosis, yes thickened nails  Neuro: no focal abnormalities, strength 5/5 b/l UE and LE, 2+ DTRs b/l UE and LE, normal gait  Peripheral pulses: 2+ pedal pulses bilaterally, good perfusion and color  Musculoskeletal: FROM, good strenth, no tenderness  Joint: normal appearance, no swelling, no warmth, no deformity in all joints    Assessment:       1. Type 2 diabetes mellitus without complication, without long-term current use of insulin    2. Essential hypertension    3. Hyperlipidemia, unspecified hyperlipidemia type    4. Chews tobacco    5. Class 2 severe obesity due to excess calories with serious comorbidity and body mass index (BMI) of 37.0 to 37.9 in adult        Plan:       Type 2 diabetes mellitus without complication, without long-term current use of insulin  Uncontrolled. Long discussion about changing medication and adding insulin. He adamantly refuses. Discussed possible changing januvia to GLP2 analogue but he again refuses. He wants to change his diet. Will recheck hbA1c in 3 months and have him back to recheck.  Advised to check glucose readings qam.  Foot exam done today and eye exam UTD.    -     Hemoglobin A1c; Future; Expected date: 03/01/2019  -     Microalbumin/creatinine urine ratio; Future; Expected date: 03/01/2019    Essential hypertension  Good  control on this regimen.     Hyperlipidemia, unspecified hyperlipidemia type  Good control and continue current regimen. He is on aspirin daily.     Chews tobacco  Strongly advised to stop dipping but he does not want to stop.     Class 2 severe obesity due to excess calories with serious comorbidity and body mass index (BMI) of 37.0 to 37.9 in adult  Encouraged healthy eating and regular exercise to help lose weight and control his diabetes.     Follow-up in about 3 months (around 3/14/2019) for chronic medical issues.

## 2019-03-08 ENCOUNTER — LAB VISIT (OUTPATIENT)
Dept: LAB | Facility: HOSPITAL | Age: 59
End: 2019-03-08
Attending: INTERNAL MEDICINE
Payer: COMMERCIAL

## 2019-03-08 DIAGNOSIS — E11.9 TYPE 2 DIABETES MELLITUS WITHOUT COMPLICATION, WITHOUT LONG-TERM CURRENT USE OF INSULIN: ICD-10-CM

## 2019-03-08 LAB
ALBUMIN/CREAT UR: 3.6 UG/MG
CREAT UR-MCNC: 110 MG/DL
MICROALBUMIN UR DL<=1MG/L-MCNC: 4 UG/ML

## 2019-03-08 PROCEDURE — 82043 UR ALBUMIN QUANTITATIVE: CPT

## 2019-03-11 ENCOUNTER — PATIENT MESSAGE (OUTPATIENT)
Dept: FAMILY MEDICINE | Facility: CLINIC | Age: 59
End: 2019-03-11

## 2019-03-12 ENCOUNTER — OFFICE VISIT (OUTPATIENT)
Dept: OPTOMETRY | Facility: CLINIC | Age: 59
End: 2019-03-12
Payer: COMMERCIAL

## 2019-03-12 DIAGNOSIS — H52.4 MYOPIA WITH ASTIGMATISM AND PRESBYOPIA, BILATERAL: ICD-10-CM

## 2019-03-12 DIAGNOSIS — Z13.5 SCREENING FOR GLAUCOMA: ICD-10-CM

## 2019-03-12 DIAGNOSIS — E11.9 DIABETES MELLITUS TYPE 2 WITHOUT RETINOPATHY: Primary | ICD-10-CM

## 2019-03-12 DIAGNOSIS — Z46.0 CONTACT LENS/GLASSES FITTING: ICD-10-CM

## 2019-03-12 DIAGNOSIS — H52.13 MYOPIA WITH ASTIGMATISM AND PRESBYOPIA, BILATERAL: ICD-10-CM

## 2019-03-12 DIAGNOSIS — H52.203 MYOPIA WITH ASTIGMATISM AND PRESBYOPIA, BILATERAL: ICD-10-CM

## 2019-03-12 DIAGNOSIS — Z46.0 CONTACT LENS/GLASSES FITTING: Primary | ICD-10-CM

## 2019-03-12 DIAGNOSIS — H43.393 VITREOUS FLOATERS, BILATERAL: ICD-10-CM

## 2019-03-12 PROCEDURE — 99499 NO LOS: ICD-10-PCS | Mod: S$GLB,,, | Performed by: OPTOMETRIST

## 2019-03-12 PROCEDURE — 99499 UNLISTED E&M SERVICE: CPT | Mod: S$GLB,,, | Performed by: OPTOMETRIST

## 2019-03-12 PROCEDURE — 92015 PR REFRACTION: ICD-10-PCS | Mod: S$GLB,,, | Performed by: OPTOMETRIST

## 2019-03-12 PROCEDURE — 92014 PR EYE EXAM, EST PATIENT,COMPREHESV: ICD-10-PCS | Mod: S$GLB,,, | Performed by: OPTOMETRIST

## 2019-03-12 PROCEDURE — 99999 PR PBB SHADOW E&M-EST. PATIENT-LVL II: CPT | Mod: PBBFAC,,, | Performed by: OPTOMETRIST

## 2019-03-12 PROCEDURE — 92310 PR CONTACT LENS FITTING (NO CHANGE): ICD-10-PCS | Mod: ,,, | Performed by: OPTOMETRIST

## 2019-03-12 PROCEDURE — 92310 CONTACT LENS FITTING OU: CPT | Mod: ,,, | Performed by: OPTOMETRIST

## 2019-03-12 PROCEDURE — 99999 PR PBB SHADOW E&M-EST. PATIENT-LVL II: ICD-10-PCS | Mod: PBBFAC,,, | Performed by: OPTOMETRIST

## 2019-03-12 PROCEDURE — 92014 COMPRE OPH EXAM EST PT 1/>: CPT | Mod: S$GLB,,, | Performed by: OPTOMETRIST

## 2019-03-12 PROCEDURE — 92015 DETERMINE REFRACTIVE STATE: CPT | Mod: S$GLB,,, | Performed by: OPTOMETRIST

## 2019-03-12 NOTE — PROGRESS NOTES
HPI     Diabetic Eye Exam      Additional comments: DM eye exam with clfu              Comments     DLS: 6/26/17    Pt states no va complaints at this time. Doing well with present contact   OD. No floaters or flashes presently.     LBSL: does not ck daily  Hemoglobin A1C       Date                     Value               Ref Range             Status                03/08/2019               8.8 (H)             4.0 - 5.6 %           Final                 12/13/2018               8.0 (H)             4.0 - 5.6 %           Final                 08/02/2018               7.1 (H)             4.0 - 5.6 %           Final              Agree above  Distance only cl's OD only, monovision w/ OS near            Last edited by LU Velázquez, OD on 3/12/2019  8:28 AM. (History)        ROS     Positive for: Eyes    Negative for: Constitutional, Gastrointestinal, Neurological, Skin,   Genitourinary, Musculoskeletal, HENT, Endocrine, Cardiovascular,   Respiratory, Psychiatric, Allergic/Imm, Heme/Lymph    Last edited by LU Velázquez, OD on 3/12/2019  8:20 AM. (History)        Assessment /Plan     For exam results, see Encounter Report.    Diabetes mellitus type 2 without retinopathy    Vitreous floaters, bilateral    Screening for glaucoma    Myopia with astigmatism and presbyopia, bilateral    Contact lens/glasses fitting      1. No ret/ no csme, gave info, control glucose, annual DFE  2. RD precautions given, reviewed  3. Low suspect, w/ moderate c/d, neg fhx---monitor for changes  4. Updated specs rx, gave copy  5. Updated clrx, gave copy and new trials, reduced power this exam    DAILY WEAR CONTACT LENSES  Continue with Daily Wear of contact lenses, up to all waking hours.  Continue with approved contact lens disinfection / rewetting drops as discussed.  Dispose of lenses monthly.  Stop wearing your lenses and call our office if redness, blurred vision, or pain persists more than 12 hours.  We recommend an annual eye exam for  contact lens patients.    Discussed and educated patient on current findings /plan.  RTC 1 year, prn if any changes / issues

## 2019-03-12 NOTE — PROGRESS NOTES
Assessment /Plan     For exam results, see Encounter Report.    Contact lens/glasses fitting      Fitting fees only --see exam notes this date

## 2019-03-15 ENCOUNTER — OFFICE VISIT (OUTPATIENT)
Dept: FAMILY MEDICINE | Facility: CLINIC | Age: 59
End: 2019-03-15
Payer: COMMERCIAL

## 2019-03-15 VITALS
BODY MASS INDEX: 36.77 KG/M2 | TEMPERATURE: 98 F | HEART RATE: 84 BPM | WEIGHT: 248.25 LBS | HEIGHT: 69 IN | RESPIRATION RATE: 18 BRPM | SYSTOLIC BLOOD PRESSURE: 136 MMHG | OXYGEN SATURATION: 96 % | DIASTOLIC BLOOD PRESSURE: 88 MMHG

## 2019-03-15 DIAGNOSIS — E11.65 TYPE 2 DIABETES MELLITUS WITH HYPERGLYCEMIA, WITHOUT LONG-TERM CURRENT USE OF INSULIN: Primary | ICD-10-CM

## 2019-03-15 DIAGNOSIS — E66.01 CLASS 2 SEVERE OBESITY DUE TO EXCESS CALORIES WITH SERIOUS COMORBIDITY AND BODY MASS INDEX (BMI) OF 36.0 TO 36.9 IN ADULT: ICD-10-CM

## 2019-03-15 DIAGNOSIS — E78.5 HYPERLIPIDEMIA, UNSPECIFIED HYPERLIPIDEMIA TYPE: ICD-10-CM

## 2019-03-15 DIAGNOSIS — Z72.0 CHEWS TOBACCO: ICD-10-CM

## 2019-03-15 DIAGNOSIS — I10 ESSENTIAL HYPERTENSION: ICD-10-CM

## 2019-03-15 PROCEDURE — 3008F PR BODY MASS INDEX (BMI) DOCUMENTED: ICD-10-PCS | Mod: CPTII,S$GLB,, | Performed by: INTERNAL MEDICINE

## 2019-03-15 PROCEDURE — 3045F PR MOST RECENT HEMOGLOBIN A1C LEVEL 7.0-9.0%: CPT | Mod: CPTII,S$GLB,, | Performed by: INTERNAL MEDICINE

## 2019-03-15 PROCEDURE — 3075F SYST BP GE 130 - 139MM HG: CPT | Mod: CPTII,S$GLB,, | Performed by: INTERNAL MEDICINE

## 2019-03-15 PROCEDURE — 3045F PR MOST RECENT HEMOGLOBIN A1C LEVEL 7.0-9.0%: ICD-10-PCS | Mod: CPTII,S$GLB,, | Performed by: INTERNAL MEDICINE

## 2019-03-15 PROCEDURE — 99214 PR OFFICE/OUTPT VISIT, EST, LEVL IV, 30-39 MIN: ICD-10-PCS | Mod: S$GLB,,, | Performed by: INTERNAL MEDICINE

## 2019-03-15 PROCEDURE — 3008F BODY MASS INDEX DOCD: CPT | Mod: CPTII,S$GLB,, | Performed by: INTERNAL MEDICINE

## 2019-03-15 PROCEDURE — 3075F PR MOST RECENT SYSTOLIC BLOOD PRESS GE 130-139MM HG: ICD-10-PCS | Mod: CPTII,S$GLB,, | Performed by: INTERNAL MEDICINE

## 2019-03-15 PROCEDURE — 3079F PR MOST RECENT DIASTOLIC BLOOD PRESSURE 80-89 MM HG: ICD-10-PCS | Mod: CPTII,S$GLB,, | Performed by: INTERNAL MEDICINE

## 2019-03-15 PROCEDURE — 3079F DIAST BP 80-89 MM HG: CPT | Mod: CPTII,S$GLB,, | Performed by: INTERNAL MEDICINE

## 2019-03-15 PROCEDURE — 99214 OFFICE O/P EST MOD 30 MIN: CPT | Mod: S$GLB,,, | Performed by: INTERNAL MEDICINE

## 2019-03-15 RX ORDER — PIOGLITAZONEHYDROCHLORIDE 15 MG/1
15 TABLET ORAL DAILY
Qty: 90 TABLET | Refills: 3 | Status: SHIPPED | OUTPATIENT
Start: 2019-03-15 | End: 2019-07-26 | Stop reason: ALTCHOICE

## 2019-03-15 NOTE — PROGRESS NOTES
Subjective:       Patient ID: Sergo Hollingsworth is a 59 y.o. male.    Medication List with Changes/Refills   New Medications    BLOOD SUGAR DIAGNOSTIC STRP    One Touch Ultra test strips. Check your glucose qday    PIOGLITAZONE (ACTOS) 15 MG TABLET    Take 1 tablet (15 mg total) by mouth once daily.   Current Medications    AMLODIPINE-BENAZEPRIL 5-20 MG (LOTREL) 5-20 MG PER CAPSULE    Take 1 capsule by mouth once daily.    ASPIRIN (ECOTRIN) 81 MG EC TABLET    Take 1 tablet (81 mg total) by mouth once daily.    ATORVASTATIN (LIPITOR) 10 MG TABLET    Take 1 tablet (10 mg total) by mouth once daily.    BLOOD-GLUCOSE METER (BLOOD GLUCOSE MONITORING) KIT    Use as instructed.  Free Style Whit    METFORMIN (GLUCOPHAGE) 1000 MG TABLET    Take 1 tablet (1,000 mg total) by mouth 2 (two) times daily with meals.    ONDANSETRON (ZOFRAN-ODT) 8 MG TBDL    Take 1 tablet (8 mg total) by mouth every 6 (six) hours as needed.    SITAGLIPTIN (JANUVIA) 100 MG TAB    Take 1 tablet (100 mg total) by mouth once daily.    TRIAMTERENE-HYDROCHLOROTHIAZIDE 37.5-25 MG (DYAZIDE) 37.5-25 MG PER CAPSULE    Take 1 capsule by mouth every morning.       Chief Complaint: Diabetes (3 month follow up with labs prior) and Hypertension  He is here today to f/u on chronic medical issues.      He has hypertension dx since 2006. He has no known CAD. He is taking combination benazepril-amlodipine 20-5 mg qday and triamterene-HCTZ 37.5-25 mg qday. He does not check his BP at home. He denies chest pain or shortness of breath.       He has diabetes and is taking metformin 1000 mg twice daily and januvia 100 mg qday. His most recent HbA1c was 8.8 on 3/2019. His  foot exam and eye exam are UTD.  His microalbumin is negative on 3/2019 and he is taking an ACEI.  He denies any neuropathy or vision issues.      He has hyperlipidemia and is on atorvastatin 10 mg qday with last lipids on 5/2018 were 118/96/49/49.  He does take an aspirin daily.      He stays very  "active. He is not careful with his diet and does not exercise.  He continues to chew tobacco and does not want to quit.  He is traveling to [place] the summer.      Colonoscopy----6/2017 repeat in 10 years  Tdap---4/2017  Pneumovax--4/2017  Influenza vaccine---refuses  Shingles vaccine---none    Review of Systems   Constitutional: Negative for appetite change, fatigue, fever and unexpected weight change.   HENT: Negative for congestion, ear pain, hearing loss, sore throat and trouble swallowing.    Eyes: Negative for pain and visual disturbance.   Respiratory: Positive for cough. Negative for chest tightness, shortness of breath and wheezing.    Cardiovascular: Negative for chest pain, palpitations and leg swelling.   Gastrointestinal: Negative for abdominal pain, blood in stool, constipation, diarrhea, nausea and vomiting.   Endocrine: Negative for polyuria.   Genitourinary: Negative for dysuria and hematuria.   Musculoskeletal: Positive for back pain (with over exertion). Negative for arthralgias and myalgias.   Skin: Negative for rash.   Allergic/Immunologic: Positive for environmental allergies.   Neurological: Negative for dizziness, weakness, numbness and headaches.   Hematological: Does not bruise/bleed easily.   Psychiatric/Behavioral: Negative for dysphoric mood, sleep disturbance and suicidal ideas. The patient is not nervous/anxious.        Objective:      Vitals:    03/15/19 0821 03/15/19 0837   BP: (!) 140/76 136/88   Pulse: 84    Resp: 18    Temp: 97.9 °F (36.6 °C)    TempSrc: Oral    SpO2: 96%    Weight: 112.6 kg (248 lb 3.8 oz)    Height: 5' 9" (1.753 m)      Body mass index is 36.66 kg/m².  Physical Exam    General appearance: No acute distress, cooperative  Eyes: PERRL, EOMI, conjunctiva clear  Ears: normal external ear and pinna, tm clear without drainage, canals clear  Nose: Normal mucosa without drainage  Throat: no exudates or erythema, tonsils not enlarged  Mouth: no sores or lesions, moist " mucous membranes  Neck: FROM, soft, supple, no thyromegaly, no bruits  Lymph: no anterior or posterior cervical adenopathy  Heart::  Regular rate and rhythm, no murmur  Lung: Clear to ascultation bilaterally, no wheezing, no rales, no rhonchi, no distress  Abdomen: Soft, nontender, no distention, no hepatosplenomegaly, bowel sounds normal, no guarding, no rebound, no peritoneal signs  Skin: no rashes, no lesions  Extremities: no edema, no cyanosis  Neuro: CN 2-12 intact, 5/5 muscle strength upper and lower extremity bilaterally, 2+ DTRs UE and LE bilaterally, normal gait, normal sensation  Peripheral pulses: 2+ pedal pulses bilaterally, good perfusion and color  Musculoskeletal: FROM, good strenth, no tenderness  Joint: normal appearance, no swelling, no warmth, no deformity in all joints    Assessment:       1. Type 2 diabetes mellitus with hyperglycemia, without long-term current use of insulin    2. Essential hypertension    3. Hyperlipidemia, unspecified hyperlipidemia type    4. Chews tobacco    5. Class 2 severe obesity due to excess calories with serious comorbidity and body mass index (BMI) of 36.0 to 36.9 in adult        Plan:       Type 2 diabetes mellitus with hyperglycemia, without long-term current use of insulin  Uncontrolled and he is very hesitant to start any injectable therapy.  Will add actos 15 mg qday to his regimen. He will start checking his glucose at home. If after adding actos his glucose remains > 130 he will let me know and increase his dose of actos. He is UTD on his foot and eye exam.   -     pioglitazone (ACTOS) 15 MG tablet; Take 1 tablet (15 mg total) by mouth once daily.  Dispense: 90 tablet; Refill: 3  -     Hemoglobin A1c; Future; Expected date: 06/15/2019  -     blood sugar diagnostic Strp; One Touch Ultra test strips. Check your glucose qday  Dispense: 100 strip; Refill: 3    Essential hypertension  Borderline control.  I have asked him to check his BP 3-4 times a week and let  me know if SBP > 140.    -     CBC auto differential; Future; Expected date: 06/15/2019  -     Lipid panel; Future; Expected date: 06/15/2019  -     TSH; Future; Expected date: 06/15/2019  -     Comprehensive metabolic panel; Future; Expected date: 06/15/2019    Hyperlipidemia, unspecified hyperlipidemia type  Good control on atorvastatin.     Chews tobacco  STrongly advised to stop chewing tobacco.     Class 2 severe obesity due to excess calories with serious comorbidity and body mass index (BMI) of 36.0 to 36.9 in adult  Discussed the need for regular exercise and healthy eating to prevent diabetes and for weight loss.     Follow-up in about 4 months (around 7/15/2019) for chronic medical issues.

## 2019-04-02 DIAGNOSIS — E11.9 TYPE 2 DIABETES MELLITUS WITHOUT COMPLICATION, WITHOUT LONG-TERM CURRENT USE OF INSULIN: ICD-10-CM

## 2019-04-02 NOTE — TELEPHONE ENCOUNTER
----- Message from Heike Mendes sent at 4/2/2019  2:45 PM CDT -----  Type:  RX Refill Request    Who Called:  Shari Hollingsworth - spouse  Refill or New Rx:  refill  RX Name and Strength:  SITagliptin (JANUVIA) 100 MG Tab  How is the patient currently taking it? (ex. 1XDay): 1 x day  Is this a 30 day or 90 day RX:  30  Preferred Pharmacy with phone number:   [facility] [phone]  Local or Mail Order:  Mail order  Ordering Provider:  Kiah Castaneda Call Back Number:  [phone]  Additional Information:

## 2019-04-08 DIAGNOSIS — E11.9 TYPE 2 DIABETES MELLITUS WITHOUT COMPLICATION, WITHOUT LONG-TERM CURRENT USE OF INSULIN: ICD-10-CM

## 2019-04-08 NOTE — TELEPHONE ENCOUNTER
----- Message from Lainey Alvarez sent at 4/8/2019  3:07 PM CDT -----  Contact: Patient   Refill     Patient needs a refill SITagliptin (JANUVIA) 100 MG Tab with [facility]      Callback:     Thank you

## 2019-04-08 NOTE — TELEPHONE ENCOUNTER
Please resend prescription to [facility] pharmacy, rx sent on 4/3/19 not received electronically.

## 2019-05-03 ENCOUNTER — TELEPHONE (OUTPATIENT)
Dept: FAMILY MEDICINE | Facility: CLINIC | Age: 59
End: 2019-05-03

## 2019-05-03 RX ORDER — PIOGLITAZONEHYDROCHLORIDE 45 MG/1
45 TABLET ORAL DAILY
Qty: 90 TABLET | Refills: 3 | Status: SHIPPED | OUTPATIENT
Start: 2019-05-03 | End: 2019-06-19 | Stop reason: SDUPTHER

## 2019-05-03 NOTE — TELEPHONE ENCOUNTER
Patient states he has increased Actos to 30mg daily and this dose has somewhat helped control sugars, patient would like to know if he can increase dosage to 45mg daily. Please advise, would like a prescription sent to local pharmacy and mail order.

## 2019-05-03 NOTE — TELEPHONE ENCOUNTER
----- Message from Heike Mendes sent at 5/3/2019 10:29 AM CDT -----  Type: Needs Medical Advice    Who Called:  Patient   Best Call Back Number: [phone]  Additional Information: patient has questions regarding dosage of pioglitazone (ACTOS) 15 MG tablet, please contact to advise.     Thank you

## 2019-05-03 NOTE — TELEPHONE ENCOUNTER
Called pt and left him a message to please return my call at [phone] so we can reschedule his appt with Dr. Parry scheduled for 7/1/19 @ 2801 as Dr. Parry will be out of the office.

## 2019-06-19 DIAGNOSIS — E11.9 TYPE 2 DIABETES MELLITUS WITHOUT COMPLICATION, WITHOUT LONG-TERM CURRENT USE OF INSULIN: Primary | ICD-10-CM

## 2019-06-19 RX ORDER — PIOGLITAZONEHYDROCHLORIDE 45 MG/1
45 TABLET ORAL DAILY
Qty: 90 TABLET | Refills: 3 | Status: SHIPPED | OUTPATIENT
Start: 2019-06-19 | End: 2020-03-20

## 2019-06-19 NOTE — TELEPHONE ENCOUNTER
----- Message from Juan F Feldman sent at 6/19/2019  4:41 PM CDT -----  Contact: pt  Type:  Pharmacy Calling to Clarify an RX    Name of Caller:  pt  Pharmacy Name:    [facility]  [facility] - [place], [place] - [address] [place] [id number]  Phone: [phone] Fax: [phone]    Prescription Name:  pioglitazone (ACTOS) 45 MG tablet  What do they need to clarify?:  Pt states they are awaiting a PA or auth to fill  Best Call Back Number:  [phone]  Additional Information:  Pt is out of this Rx and is taking this 1 x day

## 2019-07-12 ENCOUNTER — PATIENT OUTREACH (OUTPATIENT)
Dept: ADMINISTRATIVE | Facility: HOSPITAL | Age: 59
End: 2019-07-12

## 2019-07-12 NOTE — PROGRESS NOTES
Health Maintenance Due   Topic Date Due    Lipid Panel  05/07/2019    Hemoglobin A1c  06/08/2019       Chart review completed 07/12/2019

## 2019-07-18 ENCOUNTER — LAB VISIT (OUTPATIENT)
Dept: LAB | Facility: HOSPITAL | Age: 59
End: 2019-07-18
Attending: INTERNAL MEDICINE
Payer: COMMERCIAL

## 2019-07-18 ENCOUNTER — PATIENT MESSAGE (OUTPATIENT)
Dept: FAMILY MEDICINE | Facility: CLINIC | Age: 59
End: 2019-07-18

## 2019-07-18 DIAGNOSIS — E11.65 TYPE 2 DIABETES MELLITUS WITH HYPERGLYCEMIA, WITHOUT LONG-TERM CURRENT USE OF INSULIN: ICD-10-CM

## 2019-07-18 DIAGNOSIS — I10 ESSENTIAL HYPERTENSION: ICD-10-CM

## 2019-07-18 LAB
ALBUMIN SERPL BCP-MCNC: 3.9 G/DL (ref 3.5–5.2)
ALP SERPL-CCNC: 47 U/L (ref 55–135)
ALT SERPL W/O P-5'-P-CCNC: 31 U/L (ref 10–44)
ANION GAP SERPL CALC-SCNC: 7 MMOL/L (ref 8–16)
AST SERPL-CCNC: 22 U/L (ref 10–40)
BASOPHILS # BLD AUTO: 0.04 K/UL (ref 0–0.2)
BASOPHILS NFR BLD: 0.7 % (ref 0–1.9)
BILIRUB SERPL-MCNC: 0.5 MG/DL (ref 0.1–1)
BUN SERPL-MCNC: 13 MG/DL (ref 8–23)
CALCIUM SERPL-MCNC: 9.6 MG/DL (ref 8.7–10.5)
CHLORIDE SERPL-SCNC: 105 MMOL/L (ref 95–110)
CHOLEST SERPL-MCNC: 146 MG/DL (ref 120–199)
CHOLEST/HDLC SERPL: 3 {RATIO} (ref 2–5)
CO2 SERPL-SCNC: 28 MMOL/L (ref 23–29)
CREAT SERPL-MCNC: 1.1 MG/DL (ref 0.5–1.4)
DIFFERENTIAL METHOD: ABNORMAL
EOSINOPHIL # BLD AUTO: 0.2 K/UL (ref 0–0.5)
EOSINOPHIL NFR BLD: 2.6 % (ref 0–8)
ERYTHROCYTE [DISTWIDTH] IN BLOOD BY AUTOMATED COUNT: 13.4 % (ref 11.5–14.5)
EST. GFR  (AFRICAN AMERICAN): >60 ML/MIN/1.73 M^2
EST. GFR  (NON AFRICAN AMERICAN): >60 ML/MIN/1.73 M^2
ESTIMATED AVG GLUCOSE: 154 MG/DL (ref 68–131)
GLUCOSE SERPL-MCNC: 144 MG/DL (ref 70–110)
HBA1C MFR BLD HPLC: 7 % (ref 4–5.6)
HCT VFR BLD AUTO: 38.5 % (ref 40–54)
HDLC SERPL-MCNC: 49 MG/DL (ref 40–75)
HDLC SERPL: 33.6 % (ref 20–50)
HGB BLD-MCNC: 12.4 G/DL (ref 14–18)
IMM GRANULOCYTES # BLD AUTO: 0.04 K/UL (ref 0–0.04)
IMM GRANULOCYTES NFR BLD AUTO: 0.7 % (ref 0–0.5)
LDLC SERPL CALC-MCNC: 78.2 MG/DL (ref 63–159)
LYMPHOCYTES # BLD AUTO: 1.4 K/UL (ref 1–4.8)
LYMPHOCYTES NFR BLD: 23.4 % (ref 18–48)
MCH RBC QN AUTO: 30.7 PG (ref 27–31)
MCHC RBC AUTO-ENTMCNC: 32.2 G/DL (ref 32–36)
MCV RBC AUTO: 95 FL (ref 82–98)
MONOCYTES # BLD AUTO: 0.7 K/UL (ref 0.3–1)
MONOCYTES NFR BLD: 11.5 % (ref 4–15)
NEUTROPHILS # BLD AUTO: 3.6 K/UL (ref 1.8–7.7)
NEUTROPHILS NFR BLD: 61.1 % (ref 38–73)
NONHDLC SERPL-MCNC: 97 MG/DL
NRBC BLD-RTO: 0 /100 WBC
PLATELET # BLD AUTO: 252 K/UL (ref 150–350)
PMV BLD AUTO: 10.8 FL (ref 9.2–12.9)
POTASSIUM SERPL-SCNC: 3.9 MMOL/L (ref 3.5–5.1)
PROT SERPL-MCNC: 7.2 G/DL (ref 6–8.4)
RBC # BLD AUTO: 4.04 M/UL (ref 4.6–6.2)
SODIUM SERPL-SCNC: 140 MMOL/L (ref 136–145)
TRIGL SERPL-MCNC: 94 MG/DL (ref 30–150)
TSH SERPL DL<=0.005 MIU/L-ACNC: 3.88 UIU/ML (ref 0.4–4)
WBC # BLD AUTO: 5.85 K/UL (ref 3.9–12.7)

## 2019-07-18 PROCEDURE — 80061 LIPID PANEL: CPT

## 2019-07-18 PROCEDURE — 80053 COMPREHEN METABOLIC PANEL: CPT

## 2019-07-18 PROCEDURE — 85025 COMPLETE CBC W/AUTO DIFF WBC: CPT

## 2019-07-18 PROCEDURE — 83036 HEMOGLOBIN GLYCOSYLATED A1C: CPT

## 2019-07-18 PROCEDURE — 36415 COLL VENOUS BLD VENIPUNCTURE: CPT | Mod: PO

## 2019-07-18 PROCEDURE — 84443 ASSAY THYROID STIM HORMONE: CPT

## 2019-07-26 ENCOUNTER — OFFICE VISIT (OUTPATIENT)
Dept: FAMILY MEDICINE | Facility: CLINIC | Age: 59
End: 2019-07-26
Payer: COMMERCIAL

## 2019-07-26 VITALS
OXYGEN SATURATION: 95 % | WEIGHT: 262.19 LBS | TEMPERATURE: 98 F | RESPIRATION RATE: 18 BRPM | HEIGHT: 69 IN | HEART RATE: 75 BPM | DIASTOLIC BLOOD PRESSURE: 78 MMHG | SYSTOLIC BLOOD PRESSURE: 170 MMHG | BODY MASS INDEX: 38.83 KG/M2

## 2019-07-26 DIAGNOSIS — E66.01 CLASS 2 SEVERE OBESITY DUE TO EXCESS CALORIES WITH SERIOUS COMORBIDITY AND BODY MASS INDEX (BMI) OF 36.0 TO 36.9 IN ADULT: ICD-10-CM

## 2019-07-26 DIAGNOSIS — Z72.0 CHEWS TOBACCO: ICD-10-CM

## 2019-07-26 DIAGNOSIS — E78.5 HYPERLIPIDEMIA, UNSPECIFIED HYPERLIPIDEMIA TYPE: ICD-10-CM

## 2019-07-26 DIAGNOSIS — E11.65 TYPE 2 DIABETES MELLITUS WITH HYPERGLYCEMIA, WITHOUT LONG-TERM CURRENT USE OF INSULIN: Primary | ICD-10-CM

## 2019-07-26 DIAGNOSIS — I10 ESSENTIAL HYPERTENSION: ICD-10-CM

## 2019-07-26 DIAGNOSIS — R60.0 LOWER EXTREMITY EDEMA: ICD-10-CM

## 2019-07-26 DIAGNOSIS — D64.9 ANEMIA, UNSPECIFIED TYPE: ICD-10-CM

## 2019-07-26 LAB
BNP SERPL-MCNC: 109 PG/ML (ref 0–99)
FERRITIN SERPL-MCNC: 380 NG/ML (ref 20–300)
IRON SERPL-MCNC: 72 UG/DL (ref 45–160)
SATURATED IRON: 22 % (ref 20–50)
TOTAL IRON BINDING CAPACITY: 326 UG/DL (ref 250–450)
TRANSFERRIN SERPL-MCNC: 220 MG/DL (ref 200–375)

## 2019-07-26 PROCEDURE — 99214 PR OFFICE/OUTPT VISIT, EST, LEVL IV, 30-39 MIN: ICD-10-PCS | Mod: S$GLB,,, | Performed by: INTERNAL MEDICINE

## 2019-07-26 PROCEDURE — 83540 ASSAY OF IRON: CPT

## 2019-07-26 PROCEDURE — 3045F PR MOST RECENT HEMOGLOBIN A1C LEVEL 7.0-9.0%: ICD-10-PCS | Mod: CPTII,S$GLB,, | Performed by: INTERNAL MEDICINE

## 2019-07-26 PROCEDURE — 3078F DIAST BP <80 MM HG: CPT | Mod: CPTII,S$GLB,, | Performed by: INTERNAL MEDICINE

## 2019-07-26 PROCEDURE — 83880 ASSAY OF NATRIURETIC PEPTIDE: CPT

## 2019-07-26 PROCEDURE — 3077F SYST BP >= 140 MM HG: CPT | Mod: CPTII,S$GLB,, | Performed by: INTERNAL MEDICINE

## 2019-07-26 PROCEDURE — 3078F PR MOST RECENT DIASTOLIC BLOOD PRESSURE < 80 MM HG: ICD-10-PCS | Mod: CPTII,S$GLB,, | Performed by: INTERNAL MEDICINE

## 2019-07-26 PROCEDURE — 3045F PR MOST RECENT HEMOGLOBIN A1C LEVEL 7.0-9.0%: CPT | Mod: CPTII,S$GLB,, | Performed by: INTERNAL MEDICINE

## 2019-07-26 PROCEDURE — 99214 OFFICE O/P EST MOD 30 MIN: CPT | Mod: S$GLB,,, | Performed by: INTERNAL MEDICINE

## 2019-07-26 PROCEDURE — 82728 ASSAY OF FERRITIN: CPT

## 2019-07-26 PROCEDURE — 3008F PR BODY MASS INDEX (BMI) DOCUMENTED: ICD-10-PCS | Mod: CPTII,S$GLB,, | Performed by: INTERNAL MEDICINE

## 2019-07-26 PROCEDURE — 3077F PR MOST RECENT SYSTOLIC BLOOD PRESSURE >= 140 MM HG: ICD-10-PCS | Mod: CPTII,S$GLB,, | Performed by: INTERNAL MEDICINE

## 2019-07-26 PROCEDURE — 3008F BODY MASS INDEX DOCD: CPT | Mod: CPTII,S$GLB,, | Performed by: INTERNAL MEDICINE

## 2019-07-26 PROCEDURE — 85025 COMPLETE CBC W/AUTO DIFF WBC: CPT

## 2019-07-26 RX ORDER — AMLODIPINE AND BENAZEPRIL HYDROCHLORIDE 5; 20 MG/1; MG/1
1 CAPSULE ORAL DAILY
Qty: 30 CAPSULE | Refills: 0 | Status: SHIPPED | OUTPATIENT
Start: 2019-07-26 | End: 2019-08-14 | Stop reason: SDUPTHER

## 2019-07-26 RX ORDER — TRIAMTERENE AND HYDROCHLOROTHIAZIDE 37.5; 25 MG/1; MG/1
1 CAPSULE ORAL EVERY MORNING
Qty: 30 CAPSULE | Refills: 0 | Status: SHIPPED | OUTPATIENT
Start: 2019-07-26 | End: 2019-08-14 | Stop reason: SDUPTHER

## 2019-07-26 RX ORDER — FUROSEMIDE 20 MG/1
20 TABLET ORAL DAILY PRN
Qty: 10 TABLET | Refills: 0 | Status: SHIPPED | OUTPATIENT
Start: 2019-07-26 | End: 2019-12-06

## 2019-07-26 NOTE — PROGRESS NOTES
Subjective:       Patient ID: Sergo Hollingsworth is a 59 y.o. male.    Medication List with Changes/Refills   New Medications    FUROSEMIDE (LASIX) 20 MG TABLET    Take 1 tablet (20 mg total) by mouth daily as needed.   Current Medications    ASPIRIN (ECOTRIN) 81 MG EC TABLET    Take 1 tablet (81 mg total) by mouth once daily.    ATORVASTATIN (LIPITOR) 10 MG TABLET    Take 1 tablet (10 mg total) by mouth once daily.    BLOOD SUGAR DIAGNOSTIC STRP    One Touch Ultra test strips. Check your glucose qday    BLOOD-GLUCOSE METER (BLOOD GLUCOSE MONITORING) KIT    Use as instructed.  Free Style Whit    METFORMIN (GLUCOPHAGE) 1000 MG TABLET    Take 1 tablet (1,000 mg total) by mouth 2 (two) times daily with meals.    PIOGLITAZONE (ACTOS) 45 MG TABLET    Take 1 tablet (45 mg total) by mouth once daily.    SITAGLIPTIN (JANUVIA) 100 MG TAB    Take 1 tablet (100 mg total) by mouth once daily.   Changed and/or Refilled Medications    Modified Medication Previous Medication    AMLODIPINE-BENAZEPRIL 5-20 MG (LOTREL) 5-20 MG PER CAPSULE amlodipine-benazepril 5-20 mg (LOTREL) 5-20 mg per capsule       Take 1 capsule by mouth once daily.    Take 1 capsule by mouth once daily.    TRIAMTERENE-HYDROCHLOROTHIAZIDE 37.5-25 MG (DYAZIDE) 37.5-25 MG PER CAPSULE triamterene-hydrochlorothiazide 37.5-25 mg (DYAZIDE) 37.5-25 mg per capsule       Take 1 capsule by mouth every morning.    Take 1 capsule by mouth every morning.   Discontinued Medications    ONDANSETRON (ZOFRAN-ODT) 8 MG TBDL    Take 1 tablet (8 mg total) by mouth every 6 (six) hours as needed.    PIOGLITAZONE (ACTOS) 15 MG TABLET    Take 1 tablet (15 mg total) by mouth once daily.       Chief Complaint: Annual Exam (Physical with labs prior)  He is here today to f/u on chronic medical issues.      He has hypertension dx since 2006. He has no known CAD. He is taking combination benazepril-amlodipine 20-5 mg qday and triamterene-HCTZ 37.5-25 mg qday. He does not check his BP at home.  He denies chest pain or shortness of breath.  He has been out of his BP medication for 3 weeks.       He has diabetes and is taking metformin 1000 mg twice daily and januvia 100 mg qday (he has been out of januvia x 3 weeks).  He was started on actos 15 mg qday at his last appt. He reports his glucose were still elevated so he increased to 30 mg qday and then 45 mg qday. At the 45 mg qday dose his glucose were getting better but he has new onset lower leg swelling.  This has persisted and his weight is up 12 lbs.  He denies shortness of breath or chest pain.  His HbA1c is improved at 7.0 on 7/2019 (down from 8.8).  His microalbumin is negative on 3/2019 and he is taking an ACEI.  He denies any neuropathy or vision issues. He has been very resistant to starting insulin.      He has hyperlipidemia and is on atorvastatin 10 mg qday with last lipids on 7/2019 were 146/94/49/78.  He does take an aspirin daily.     He has mild anemia with normal indices on labs on 7/2019 with H+H of 12.4/38.5. He has had mild anemia about 2 years ago that resolved. He denies any blood in his stool. His last colonoscopy was on 6/2017 was normal.       He stays very active. He is not careful with his diet and does not exercise.  He continues to chew tobacco and does not want to quit.       Colonoscopy----6/2017 repeat in 10 years  Tdap---4/2017  Pneumovax--4/2017  Influenza vaccine---refuses  Shingles vaccine---none    Review of Systems   Constitutional: Negative for appetite change, fatigue, fever and unexpected weight change.   HENT: Negative for congestion, ear pain, hearing loss, sore throat and trouble swallowing.    Eyes: Negative for pain and visual disturbance.   Respiratory: Negative for cough, chest tightness, shortness of breath and wheezing.    Cardiovascular: Positive for leg swelling. Negative for chest pain and palpitations.   Gastrointestinal: Negative for abdominal pain, blood in stool, constipation, diarrhea, nausea and  "vomiting.   Endocrine: Negative for polyuria.   Genitourinary: Negative for dysuria and hematuria.   Musculoskeletal: Positive for back pain. Negative for arthralgias and myalgias.   Skin: Negative for rash.   Neurological: Negative for dizziness, weakness, numbness and headaches.   Hematological: Does not bruise/bleed easily.   Psychiatric/Behavioral: Negative for dysphoric mood, sleep disturbance and suicidal ideas. The patient is not nervous/anxious.        Objective:      Vitals:    07/26/19 0810   BP: (!) 170/78   Pulse: 75   Resp: 18   Temp: 97.9 °F (36.6 °C)   TempSrc: Oral   SpO2: 95%   Weight: 118.9 kg (262 lb 3.2 oz)   Height: 5' 9" (1.753 m)     Body mass index is 38.72 kg/m².  Physical Exam    General appearance: No acute distress, cooperative  Neck: FROM, soft, supple, no thyromegaly, no bruits, no JVD  Lymph: no anterior or posterior cervical adenopathy  Heart::  Regular rate and rhythm, no murmur  Lung: Clear to ascultation bilaterally, no wheezing, no rales, no rhonchi, no distress  Abdomen: Soft, nontender, no distention, no hepatosplenomegaly, bowel sounds normal, no guarding, no rebound, no peritoneal signs  Skin: no rashes, no lesions  Extremities: 2 + pitting edema, no cyanosis  Neuro: no focal abnormalities, strength 5/5 b/l UE and LE, 2+ DTRs b/l UE and LE, normal gait  Peripheral pulses: 2+ pedal pulses bilaterally, good perfusion and color  Musculoskeletal: FROM, good strenth, no tenderness  Joint: normal appearance, no swelling, no warmth, no deformity in all joints    Assessment:       1. Type 2 diabetes mellitus with hyperglycemia, without long-term current use of insulin    2. Essential hypertension    3. Hyperlipidemia, unspecified hyperlipidemia type    4. Lower extremity edema    5. Anemia, unspecified type    6. Chews tobacco    7. Class 2 severe obesity due to excess calories with serious comorbidity and body mass index (BMI) of 36.0 to 36.9 in adult        Plan:       Type 2 " diabetes mellitus with hyperglycemia, without long-term current use of insulin  Uncontrolled.  What he needs is to stop actos and start basal insulin nightly. He is not willing to start insulin at this time.  I agreed to doing a trial of actos 45 mg 1/2 pill qday in addition to januvia 100 mg qday and metformin 1000 mg bid.  I feel this will either cause continued edema or his glucose will be elevated. I will discuss with him on the phone/mychart in one week.  I am hoping if he fails this trial of actos that he will agree to insulin.    -     Hemoglobin A1c; Future; Expected date: 10/26/2019    Essential hypertension  Uncontrolled because he is off his BP medication.  Will restart today. He was well controlled on this regimen in the past.   -     amlodipine-benazepril 5-20 mg (LOTREL) 5-20 mg per capsule; Take 1 capsule by mouth once daily.  Dispense: 30 capsule; Refill: 0  -     triamterene-hydrochlorothiazide 37.5-25 mg (DYAZIDE) 37.5-25 mg per capsule; Take 1 capsule by mouth every morning.  Dispense: 30 capsule; Refill: 0  -     CBC auto differential  -     Brain natriuretic peptide    Hyperlipidemia, unspecified hyperlipidemia type  Good control on atorvastatin. Continue aspirin daily.     Lower extremity edema  New finding and I feel due to side effect of actos (he is also on amlodipine but he has been on this medication for years without issues). Will given him lasix 20 mg qday x 2 days to help with fluid.  He will hold actos x 3 days. He wants to do a trial on 1/2 dose of actos and will look for recurrent swelling. He is to weight himself daily (he is currently up 12 lbs).  Will check BNP and echo.   -     Transthoracic echo (TTE) 2D with Color Flow; Future  -     furosemide (LASIX) 20 MG tablet; Take 1 tablet (20 mg total) by mouth daily as needed.  Dispense: 10 tablet; Refill: 0    Anemia, unspecified type  Noted on labs and has normal indices. Will check iron studies.  He has a recent normal colonoscopy.    -     Ferritin  -     Iron and TIBC    Chews tobacco  Strongly advised to stop chewing tobacco but he is not ready to quit.     Class 2 severe obesity due to excess calories with serious comorbidity and body mass index (BMI) of 36.0 to 36.9 in adult  Long discussion on lifestyle changes with healthy eating and regular exercise to help control his diabetes and to lose weight.     Follow up in about 4 months (around 11/26/2019) for chronic medical issues.

## 2019-07-26 NOTE — PATIENT INSTRUCTIONS
Take lasix 20 mg once a day for 2 days  Weight yourself daily (todays weight is 262---baseline is 250)    Hold actos til Monday and then restart a 1/2 pill daily.     I want to know if ---you refill with fluid and your sugars on 1/2 pill

## 2019-07-27 LAB
BASOPHILS # BLD AUTO: 0.05 K/UL (ref 0–0.2)
BASOPHILS NFR BLD: 0.9 % (ref 0–1.9)
DIFFERENTIAL METHOD: ABNORMAL
EOSINOPHIL # BLD AUTO: 0.1 K/UL (ref 0–0.5)
EOSINOPHIL NFR BLD: 2 % (ref 0–8)
ERYTHROCYTE [DISTWIDTH] IN BLOOD BY AUTOMATED COUNT: 13.5 % (ref 11.5–14.5)
HCT VFR BLD AUTO: 39 % (ref 40–54)
HGB BLD-MCNC: 12.7 G/DL (ref 14–18)
IMM GRANULOCYTES # BLD AUTO: 0.03 K/UL (ref 0–0.04)
IMM GRANULOCYTES NFR BLD AUTO: 0.5 % (ref 0–0.5)
LYMPHOCYTES # BLD AUTO: 1.3 K/UL (ref 1–4.8)
LYMPHOCYTES NFR BLD: 23.5 % (ref 18–48)
MCH RBC QN AUTO: 30.4 PG (ref 27–31)
MCHC RBC AUTO-ENTMCNC: 32.6 G/DL (ref 32–36)
MCV RBC AUTO: 93 FL (ref 82–98)
MONOCYTES # BLD AUTO: 0.5 K/UL (ref 0.3–1)
MONOCYTES NFR BLD: 8.8 % (ref 4–15)
NEUTROPHILS # BLD AUTO: 3.6 K/UL (ref 1.8–7.7)
NEUTROPHILS NFR BLD: 64.3 % (ref 38–73)
NRBC BLD-RTO: 0 /100 WBC
PLATELET # BLD AUTO: 254 K/UL (ref 150–350)
PMV BLD AUTO: 11.2 FL (ref 9.2–12.9)
RBC # BLD AUTO: 4.18 M/UL (ref 4.6–6.2)
WBC # BLD AUTO: 5.57 K/UL (ref 3.9–12.7)

## 2019-07-28 ENCOUNTER — PATIENT MESSAGE (OUTPATIENT)
Dept: FAMILY MEDICINE | Facility: CLINIC | Age: 59
End: 2019-07-28

## 2019-07-29 ENCOUNTER — PATIENT MESSAGE (OUTPATIENT)
Dept: FAMILY MEDICINE | Facility: CLINIC | Age: 59
End: 2019-07-29

## 2019-07-30 ENCOUNTER — PATIENT MESSAGE (OUTPATIENT)
Dept: FAMILY MEDICINE | Facility: CLINIC | Age: 59
End: 2019-07-30

## 2019-08-08 ENCOUNTER — CLINICAL SUPPORT (OUTPATIENT)
Dept: CARDIOLOGY | Facility: CLINIC | Age: 59
End: 2019-08-08
Attending: INTERNAL MEDICINE
Payer: COMMERCIAL

## 2019-08-08 VITALS
HEART RATE: 72 BPM | BODY MASS INDEX: 38.8 KG/M2 | DIASTOLIC BLOOD PRESSURE: 70 MMHG | SYSTOLIC BLOOD PRESSURE: 120 MMHG | WEIGHT: 262 LBS | HEIGHT: 69 IN

## 2019-08-08 DIAGNOSIS — R60.0 LOWER EXTREMITY EDEMA: ICD-10-CM

## 2019-08-08 LAB
ASCENDING AORTA: 2.77 CM
AV INDEX (PROSTH): 0.75
AV MEAN GRADIENT: 4 MMHG
AV PEAK GRADIENT: 7 MMHG
AV VALVE AREA: 2.97 CM2
AV VELOCITY RATIO: 0.84
BSA FOR ECHO PROCEDURE: 2.41 M2
CV ECHO LV RWT: 0.46 CM
DOP CALC AO PEAK VEL: 1.35 M/S
DOP CALC AO VTI: 29.56 CM
DOP CALC LVOT AREA: 4 CM2
DOP CALC LVOT DIAMETER: 2.25 CM
DOP CALC LVOT PEAK VEL: 1.13 M/S
DOP CALC LVOT STROKE VOLUME: 87.87 CM3
DOP CALCLVOT PEAK VEL VTI: 22.11 CM
E WAVE DECELERATION TIME: 182.14 MSEC
E/A RATIO: 0.93
E/E' RATIO: 13.33 M/S
ECHO LV POSTERIOR WALL: 1 CM (ref 0.6–1.1)
FRACTIONAL SHORTENING: 44 % (ref 28–44)
INTERVENTRICULAR SEPTUM: 1.07 CM (ref 0.6–1.1)
IVRT: 0.11 MSEC
LA MAJOR: 5.13 CM
LA MINOR: 4.89 CM
LA WIDTH: 2.71 CM
LEFT ATRIUM SIZE: 4.64 CM
LEFT ATRIUM VOLUME INDEX: 23.1 ML/M2
LEFT ATRIUM VOLUME: 53.52 CM3
LEFT INTERNAL DIMENSION IN SYSTOLE: 2.43 CM (ref 2.1–4)
LEFT VENTRICLE DIASTOLIC VOLUME INDEX: 36.1 ML/M2
LEFT VENTRICLE DIASTOLIC VOLUME: 83.64 ML
LEFT VENTRICLE MASS INDEX: 65 G/M2
LEFT VENTRICLE SYSTOLIC VOLUME INDEX: 9 ML/M2
LEFT VENTRICLE SYSTOLIC VOLUME: 20.8 ML
LEFT VENTRICULAR INTERNAL DIMENSION IN DIASTOLE: 4.31 CM (ref 3.5–6)
LEFT VENTRICULAR MASS: 150.05 G
LV LATERAL E/E' RATIO: 13.33 M/S
LV SEPTAL E/E' RATIO: 13.33 M/S
MV PEAK A VEL: 0.86 M/S
MV PEAK E VEL: 0.8 M/S
PISA TR MAX VEL: 2.64 M/S
PULM VEIN S/D RATIO: 1.28
PV PEAK D VEL: 0.46 M/S
PV PEAK S VEL: 0.59 M/S
RA MAJOR: 5.07 CM
RA PRESSURE: 3 MMHG
RA WIDTH: 3.51 CM
RIGHT VENTRICULAR END-DIASTOLIC DIMENSION: 3.08 CM
SINUS: 2.52 CM
STJ: 2.32 CM
TDI LATERAL: 0.06 M/S
TDI SEPTAL: 0.06 M/S
TDI: 0.06 M/S
TR MAX PG: 28 MMHG
TRICUSPID ANNULAR PLANE SYSTOLIC EXCURSION: 1.82 CM
TV REST PULMONARY ARTERY PRESSURE: 31 MMHG

## 2019-08-08 PROCEDURE — 93306 TTE W/DOPPLER COMPLETE: CPT | Mod: S$GLB,,, | Performed by: INTERNAL MEDICINE

## 2019-08-08 PROCEDURE — 99999 PR PBB SHADOW E&M-EST. PATIENT-LVL II: ICD-10-PCS | Mod: PBBFAC,,,

## 2019-08-08 PROCEDURE — 93306 TRANSTHORACIC ECHO (TTE) COMPLETE (CUPID ONLY): ICD-10-PCS | Mod: S$GLB,,, | Performed by: INTERNAL MEDICINE

## 2019-08-08 PROCEDURE — 99999 PR PBB SHADOW E&M-EST. PATIENT-LVL II: CPT | Mod: PBBFAC,,,

## 2019-08-09 ENCOUNTER — PATIENT MESSAGE (OUTPATIENT)
Dept: FAMILY MEDICINE | Facility: CLINIC | Age: 59
End: 2019-08-09

## 2019-08-14 DIAGNOSIS — I10 ESSENTIAL HYPERTENSION: ICD-10-CM

## 2019-08-14 RX ORDER — TRIAMTERENE AND HYDROCHLOROTHIAZIDE 37.5; 25 MG/1; MG/1
1 CAPSULE ORAL EVERY MORNING
Qty: 90 CAPSULE | Refills: 3 | Status: SHIPPED | OUTPATIENT
Start: 2019-08-14 | End: 2020-09-11

## 2019-08-14 RX ORDER — AMLODIPINE AND BENAZEPRIL HYDROCHLORIDE 5; 20 MG/1; MG/1
1 CAPSULE ORAL DAILY
Qty: 90 CAPSULE | Refills: 3 | Status: SHIPPED | OUTPATIENT
Start: 2019-08-14 | End: 2020-09-11

## 2019-10-11 ENCOUNTER — PATIENT OUTREACH (OUTPATIENT)
Dept: ADMINISTRATIVE | Facility: HOSPITAL | Age: 59
End: 2019-10-11

## 2019-10-11 NOTE — PROGRESS NOTES
Health Maintenance Due   Topic Date Due    Low Dose Statin  02/14/1979    Shingles Vaccine (1 of 2) 02/14/2008    Influenza Vaccine (1) 09/01/2019

## 2019-11-07 ENCOUNTER — LAB VISIT (OUTPATIENT)
Dept: LAB | Facility: HOSPITAL | Age: 59
End: 2019-11-07
Attending: INTERNAL MEDICINE
Payer: COMMERCIAL

## 2019-11-07 DIAGNOSIS — E11.65 TYPE 2 DIABETES MELLITUS WITH HYPERGLYCEMIA, WITHOUT LONG-TERM CURRENT USE OF INSULIN: ICD-10-CM

## 2019-11-07 PROCEDURE — 83036 HEMOGLOBIN GLYCOSYLATED A1C: CPT

## 2019-11-07 PROCEDURE — 36415 COLL VENOUS BLD VENIPUNCTURE: CPT | Mod: PO

## 2019-11-08 ENCOUNTER — PATIENT MESSAGE (OUTPATIENT)
Dept: FAMILY MEDICINE | Facility: CLINIC | Age: 59
End: 2019-11-08

## 2019-11-08 LAB
ESTIMATED AVG GLUCOSE: 177 MG/DL (ref 68–131)
HBA1C MFR BLD HPLC: 7.8 % (ref 4–5.6)

## 2019-11-09 RX ORDER — ATORVASTATIN CALCIUM 10 MG/1
TABLET, FILM COATED ORAL
Qty: 90 TABLET | Refills: 4 | Status: SHIPPED | OUTPATIENT
Start: 2019-11-09 | End: 2020-12-14

## 2019-11-12 ENCOUNTER — OFFICE VISIT (OUTPATIENT)
Dept: FAMILY MEDICINE | Facility: CLINIC | Age: 59
End: 2019-11-12
Payer: COMMERCIAL

## 2019-11-12 VITALS
HEART RATE: 85 BPM | WEIGHT: 261.13 LBS | HEIGHT: 69 IN | DIASTOLIC BLOOD PRESSURE: 86 MMHG | OXYGEN SATURATION: 97 % | BODY MASS INDEX: 38.68 KG/M2 | TEMPERATURE: 98 F | RESPIRATION RATE: 18 BRPM | SYSTOLIC BLOOD PRESSURE: 138 MMHG

## 2019-11-12 DIAGNOSIS — I10 ESSENTIAL HYPERTENSION: Primary | ICD-10-CM

## 2019-11-12 DIAGNOSIS — E78.5 HYPERLIPIDEMIA, UNSPECIFIED HYPERLIPIDEMIA TYPE: ICD-10-CM

## 2019-11-12 DIAGNOSIS — E11.65 TYPE 2 DIABETES MELLITUS WITH HYPERGLYCEMIA, WITHOUT LONG-TERM CURRENT USE OF INSULIN: ICD-10-CM

## 2019-11-12 DIAGNOSIS — D64.9 ANEMIA, UNSPECIFIED TYPE: ICD-10-CM

## 2019-11-12 DIAGNOSIS — E66.01 CLASS 2 SEVERE OBESITY DUE TO EXCESS CALORIES WITH SERIOUS COMORBIDITY AND BODY MASS INDEX (BMI) OF 38.0 TO 38.9 IN ADULT: ICD-10-CM

## 2019-11-12 PROCEDURE — 3079F DIAST BP 80-89 MM HG: CPT | Mod: CPTII,S$GLB,, | Performed by: INTERNAL MEDICINE

## 2019-11-12 PROCEDURE — 99214 OFFICE O/P EST MOD 30 MIN: CPT | Mod: S$GLB,,, | Performed by: INTERNAL MEDICINE

## 2019-11-12 PROCEDURE — 3075F PR MOST RECENT SYSTOLIC BLOOD PRESS GE 130-139MM HG: ICD-10-PCS | Mod: CPTII,S$GLB,, | Performed by: INTERNAL MEDICINE

## 2019-11-12 PROCEDURE — 99214 PR OFFICE/OUTPT VISIT, EST, LEVL IV, 30-39 MIN: ICD-10-PCS | Mod: S$GLB,,, | Performed by: INTERNAL MEDICINE

## 2019-11-12 PROCEDURE — 3075F SYST BP GE 130 - 139MM HG: CPT | Mod: CPTII,S$GLB,, | Performed by: INTERNAL MEDICINE

## 2019-11-12 PROCEDURE — 3079F PR MOST RECENT DIASTOLIC BLOOD PRESSURE 80-89 MM HG: ICD-10-PCS | Mod: CPTII,S$GLB,, | Performed by: INTERNAL MEDICINE

## 2019-11-12 PROCEDURE — 3008F PR BODY MASS INDEX (BMI) DOCUMENTED: ICD-10-PCS | Mod: CPTII,S$GLB,, | Performed by: INTERNAL MEDICINE

## 2019-11-12 PROCEDURE — 3008F BODY MASS INDEX DOCD: CPT | Mod: CPTII,S$GLB,, | Performed by: INTERNAL MEDICINE

## 2019-11-12 NOTE — PROGRESS NOTES
Subjective:       Patient ID: Sergo Hollingsworth is a 59 y.o. male.    Medication List with Changes/Refills   Current Medications    AMLODIPINE-BENAZEPRIL 5-20 MG (LOTREL) 5-20 MG PER CAPSULE    Take 1 capsule by mouth once daily.    ASPIRIN (ECOTRIN) 81 MG EC TABLET    Take 1 tablet (81 mg total) by mouth once daily.    ATORVASTATIN (LIPITOR) 10 MG TABLET    TAKE 1 TABLET DAILY    BLOOD SUGAR DIAGNOSTIC STRP    One Touch Ultra test strips. Check your glucose qday    BLOOD-GLUCOSE METER (BLOOD GLUCOSE MONITORING) KIT    Use as instructed.  Free Style Whit    FUROSEMIDE (LASIX) 20 MG TABLET    Take 1 tablet (20 mg total) by mouth daily as needed.    METFORMIN (GLUCOPHAGE) 1000 MG TABLET    Take 1 tablet (1,000 mg total) by mouth 2 (two) times daily with meals.    PIOGLITAZONE (ACTOS) 45 MG TABLET    Take 1 tablet (45 mg total) by mouth once daily.    TRIAMTERENE-HYDROCHLOROTHIAZIDE 37.5-25 MG (DYAZIDE) 37.5-25 MG PER CAPSULE    Take 1 capsule by mouth every morning.   Changed and/or Refilled Medications    Modified Medication Previous Medication    SITAGLIPTIN (JANUVIA) 100 MG TAB SITagliptin (JANUVIA) 100 MG Tab       Take 1 tablet (100 mg total) by mouth once daily.    Take 1 tablet (100 mg total) by mouth once daily.       Chief Complaint: Diabetes (Follow up)  He is here today to f/u on chronic medical issues.       He has hypertension dx since 2006. He has no known CAD. He is taking combination benazepril-amlodipine 20-5 mg qday and triamterene-HCTZ 37.5-25 mg qday. He does not check his BP at home. He denies chest pain or shortness of breath.       He has diabetes and is taking metformin 1000 mg twice daily and januvia 100 mg qday and actos 45 mg 1/2 pill qday (at full dose of actos he gets lower leg swelling).  He has been out of januvia and actos x over 2 weeks and he is only taking metformin 1000 mg in the am. His most recent HbA1c was 7.8 on 11/2019. He does not check his glucose at home. His eye is UTD and  he is due for a foot exam. His microalbumin is negative on 3/2019 and he is taking an ACEI.  He denies any neuropathy or vision issues. He has been very resistant to starting insulin.      He has hyperlipidemia and is on atorvastatin 10 mg qday with last lipids on 7/2019 were 146/94/49/78.  He does take an aspirin daily.      He has mild anemia with normal indices on labs on 7/2019 with H+H of 12.4/38.5. He has had mild anemia about 2 years ago that resolved. He denies any blood in his stool. His last colonoscopy was on 6/2017 was normal.       He stays very active. He is not careful with his diet and does not exercise.  He continues to chew tobacco and does not want to quit.       Colonoscopy----6/2017 repeat in 10 years  Tdap---4/2017  Pneumovax--4/2017  Influenza vaccine---refuses  Shingles vaccine---none     Review of Systems   Constitutional: Negative for appetite change, fatigue, fever and unexpected weight change.   HENT: Negative for congestion, ear pain, hearing loss, sore throat and trouble swallowing.    Eyes: Negative for pain and visual disturbance.   Respiratory: Negative for cough, chest tightness, shortness of breath and wheezing.    Cardiovascular: Positive for leg swelling. Negative for chest pain and palpitations.   Gastrointestinal: Negative for abdominal pain, blood in stool, constipation, diarrhea, nausea and vomiting.   Endocrine: Negative for polyuria.   Genitourinary: Negative for dysuria and hematuria.   Musculoskeletal: Positive for back pain. Negative for arthralgias and myalgias.   Skin: Negative for rash.   Neurological: Negative for dizziness, weakness, numbness and headaches.   Hematological: Does not bruise/bleed easily.   Psychiatric/Behavioral: Negative for dysphoric mood, sleep disturbance and suicidal ideas. The patient is not nervous/anxious.        Objective:      Vitals:    11/12/19 0901 11/12/19 0921   BP: (!) 144/76 138/86   Pulse: 85    Resp: 18    Temp: 97.7 °F (36.5 °C)   "  TempSrc: Oral    SpO2: 97%    Weight: 118.5 kg (261 lb 2.2 oz)    Height: 5' 9" (1.753 m)      Body mass index is 38.56 kg/m².  Physical Exam    General appearance: No acute distress, cooperative  Eyes: PERRL, EOMI, conjunctiva clear  HEENT: ears normal, nose without rhinorrhea, normal turbinates,mouth no sores or lesions, throat no erythema or pus  Neck: FROM, soft, supple, no thyromegaly, no bruits  Lymph: no anterior or posterior cervical adenopathy  Heart::  Regular rate and rhythm, no murmur  Lung: Clear to ascultation bilaterally, no wheezing, no rales, no rhonchi, no distress  Abdomen: Soft, nontender, no distention, no hepatosplenomegaly, bowel sounds normal, no guarding, no rebound, no peritoneal signs  Skin: no rashes, no lesions  Extremities: trace edema b/l, no cyanosis  Diabetic foot exam:   Left: Pulses: 2+ pedal pulses   Sensation: normal   Filament test present   Apperance: no ulcers, yes heel callous formation, no deformities, no onychomycosis, no thickened nails   Right: Pulses: 2+ pedal pulses   Sensation: normal   Filament test present   Appearance: no ulcers, yes heel callous formation, no deformities, no onychomycosis, no thickened nails  Neuro: no focal abnormalities, strength 5/5 b/l UE and LE, 2+ DTRs b/l UE and LE, normal gait  Peripheral pulses: 2+ pedal pulses bilaterally, good perfusion and color  Musculoskeletal: FROM, good strenth, no tenderness  Joint: normal appearance, no swelling, no warmth, no deformity in all joints    Assessment:       1. Essential hypertension    2. Hyperlipidemia, unspecified hyperlipidemia type    3. Type 2 diabetes mellitus with hyperglycemia, without long-term current use of insulin    4. Anemia, unspecified type    5. Class 2 severe obesity due to excess calories with serious comorbidity and body mass index (BMI) of 38.0 to 38.9 in adult        Plan:       Essential hypertension  Good control on this regimen and continue to follow at home. "     Hyperlipidemia, unspecified hyperlipidemia type  Good control on atorvastatin and aspirin daily.     Type 2 diabetes mellitus with hyperglycemia, without long-term current use of insulin  Uncontrolled but he is off his januvia and actos. Will restart and recheck HbA1c in 3 months. Foot exam done today.    -     SITagliptin (JANUVIA) 100 MG Tab; Take 1 tablet (100 mg total) by mouth once daily.  Dispense: 10 tablet; Refill: 0  -     Hemoglobin A1c; Future; Expected date: 02/12/2020    Anemia, unspecified type  STable and continue to monitor.     Class 2 severe obesity due to excess calories with serious comorbidity and body mass index (BMI) of 38.0 to 38.9 in adult  Long discussion on the benefits of healthy eating and regular exercise to help lose weight and help control diabetes and hypertension.     Follow up in about 4 months (around 3/12/2020) for chronic medical issues.

## 2019-11-14 DIAGNOSIS — E11.9 TYPE 2 DIABETES MELLITUS WITHOUT COMPLICATION, WITHOUT LONG-TERM CURRENT USE OF INSULIN: ICD-10-CM

## 2019-11-15 RX ORDER — METFORMIN HYDROCHLORIDE 1000 MG/1
TABLET ORAL
Qty: 180 TABLET | Refills: 4 | Status: SHIPPED | OUTPATIENT
Start: 2019-11-15 | End: 2020-12-14

## 2019-12-06 ENCOUNTER — OFFICE VISIT (OUTPATIENT)
Dept: FAMILY MEDICINE | Facility: CLINIC | Age: 59
End: 2019-12-06
Payer: COMMERCIAL

## 2019-12-06 VITALS
BODY MASS INDEX: 37.96 KG/M2 | WEIGHT: 256.31 LBS | OXYGEN SATURATION: 95 % | TEMPERATURE: 98 F | RESPIRATION RATE: 20 BRPM | HEART RATE: 99 BPM | DIASTOLIC BLOOD PRESSURE: 78 MMHG | HEIGHT: 69 IN | SYSTOLIC BLOOD PRESSURE: 136 MMHG

## 2019-12-06 DIAGNOSIS — J01.00 ACUTE NON-RECURRENT MAXILLARY SINUSITIS: Primary | ICD-10-CM

## 2019-12-06 PROCEDURE — 99213 OFFICE O/P EST LOW 20 MIN: CPT | Mod: S$GLB,,, | Performed by: INTERNAL MEDICINE

## 2019-12-06 PROCEDURE — 3078F PR MOST RECENT DIASTOLIC BLOOD PRESSURE < 80 MM HG: ICD-10-PCS | Mod: CPTII,S$GLB,, | Performed by: INTERNAL MEDICINE

## 2019-12-06 PROCEDURE — 3075F PR MOST RECENT SYSTOLIC BLOOD PRESS GE 130-139MM HG: ICD-10-PCS | Mod: CPTII,S$GLB,, | Performed by: INTERNAL MEDICINE

## 2019-12-06 PROCEDURE — 3008F PR BODY MASS INDEX (BMI) DOCUMENTED: ICD-10-PCS | Mod: CPTII,S$GLB,, | Performed by: INTERNAL MEDICINE

## 2019-12-06 PROCEDURE — 99213 PR OFFICE/OUTPT VISIT, EST, LEVL III, 20-29 MIN: ICD-10-PCS | Mod: S$GLB,,, | Performed by: INTERNAL MEDICINE

## 2019-12-06 PROCEDURE — 3078F DIAST BP <80 MM HG: CPT | Mod: CPTII,S$GLB,, | Performed by: INTERNAL MEDICINE

## 2019-12-06 PROCEDURE — 3075F SYST BP GE 130 - 139MM HG: CPT | Mod: CPTII,S$GLB,, | Performed by: INTERNAL MEDICINE

## 2019-12-06 PROCEDURE — 3008F BODY MASS INDEX DOCD: CPT | Mod: CPTII,S$GLB,, | Performed by: INTERNAL MEDICINE

## 2019-12-06 RX ORDER — AZITHROMYCIN 250 MG/1
TABLET, FILM COATED ORAL
Qty: 6 TABLET | Refills: 0 | Status: SHIPPED | OUTPATIENT
Start: 2019-12-06 | End: 2019-12-11

## 2019-12-06 NOTE — PROGRESS NOTES
Subjective:       Patient ID: Sergo Hollingsworth is a 59 y.o. male.    Medication List with Changes/Refills   New Medications    AZITHROMYCIN (Z-OSMEL) 250 MG TABLET    Take 2 tablets by mouth on day 1; Take 1 tablet by mouth on days 2-5   Current Medications    AMLODIPINE-BENAZEPRIL 5-20 MG (LOTREL) 5-20 MG PER CAPSULE    Take 1 capsule by mouth once daily.    ASPIRIN (ECOTRIN) 81 MG EC TABLET    Take 1 tablet (81 mg total) by mouth once daily.    ATORVASTATIN (LIPITOR) 10 MG TABLET    TAKE 1 TABLET DAILY    BLOOD SUGAR DIAGNOSTIC STRP    One Touch Ultra test strips. Check your glucose qday    BLOOD-GLUCOSE METER (BLOOD GLUCOSE MONITORING) KIT    Use as instructed.  Free Style Whit    METFORMIN (GLUCOPHAGE) 1000 MG TABLET    TAKE 1 TABLET TWICE A DAY WITH MEALS    PIOGLITAZONE (ACTOS) 45 MG TABLET    Take 1 tablet (45 mg total) by mouth once daily.    SITAGLIPTIN (JANUVIA) 100 MG TAB    Take 1 tablet (100 mg total) by mouth once daily.    TRIAMTERENE-HYDROCHLOROTHIAZIDE 37.5-25 MG (DYAZIDE) 37.5-25 MG PER CAPSULE    Take 1 capsule by mouth every morning.   Discontinued Medications    FUROSEMIDE (LASIX) 20 MG TABLET    Take 1 tablet (20 mg total) by mouth daily as needed.       Chief Complaint: Nasal Congestion and Cough  He presents with 10 days of nasal congestion and coughing. He started with headaches and nasal congestion. On day 3 to 4 he developed low grade fever that has resolved. He continued with sinus pressure and headaches. He developed chest tightness with coughing.  No ear pain or sore throat. No GI symptoms. No rashes.  No wheezing or shortness of breath.  Today he is starting to feel better.     Review of Systems   Constitutional: Negative for activity change, appetite change, chills, fatigue and fever.   HENT: Positive for congestion and sinus pressure. Negative for ear discharge, ear pain, mouth sores, postnasal drip, rhinorrhea and sore throat.    Eyes: Negative for pain, discharge and redness.  "  Respiratory: Positive for cough. Negative for chest tightness, shortness of breath and wheezing.    Gastrointestinal: Negative for abdominal pain, constipation, diarrhea, nausea and vomiting.   Genitourinary: Negative for dysuria.   Musculoskeletal: Negative for arthralgias and neck stiffness.   Skin: Negative for rash.   Neurological: Negative for headaches.   Hematological: Negative for adenopathy.       Objective:      Vitals:    12/06/19 0811   BP: 136/78   Pulse: 99   Resp: 20   Temp: 97.9 °F (36.6 °C)   TempSrc: Oral   SpO2: 95%   Weight: 116.3 kg (256 lb 4.6 oz)   Height: 5' 9" (1.753 m)     Body mass index is 37.85 kg/m².  Physical Exam    General appearance: alert, no acute distress  Head: atraumatic  Eyes: PERRL, EMOI, normal conjunctiva, no drainage  Ears: tm normal with good visualization of landmarks, no erythema or pus, canals normal, external ear normal  Nose: erythema mucosa, no polyps or sores, no rhinorrhea  Throat: no erythema, no exudates, tonsils appear normal, thick whitish mucous in back of throat  Mouth: no sores or lesion, moist mucous membranes  Neck: supple, FROM, no masses, no tenderness  Lymph: no posterior or cervical adenopathy  Lungs: no distress, no retractions, clear to ascultation bilaterally, no wheezing, no rales, no rhonchi  Heart:: Regular rate and rhythm, no murmur  Abdomen: soft, non-tender, no guarding, no rebound, no peritoneal signs, bowel sounds normal, no hepatosplenomegaly, no masses  Skin: no rashes or lesion  Perfusion: good capillary refill, normal pulses      Assessment:       1. Acute non-recurrent maxillary sinusitis        Plan:       Acute non-recurrent maxillary sinusitis  I feel his symptoms are viral and that he is getting better. I offered reassurance and recommended waiting on treatment. Abx rx given if he should worsen over the weekend. He agrees with waiting to start treatment.   -     azithromycin (Z-OSMEL) 250 MG tablet; Take 2 tablets by mouth on day " 1; Take 1 tablet by mouth on days 2-5  Dispense: 6 tablet; Refill: 0    Follow up if symptoms worsen or fail to improve.

## 2019-12-12 ENCOUNTER — TELEPHONE (OUTPATIENT)
Dept: FAMILY MEDICINE | Facility: CLINIC | Age: 59
End: 2019-12-12

## 2019-12-12 NOTE — TELEPHONE ENCOUNTER
----- Message from Dorothy Virgen sent at 12/12/2019  1:25 PM CST -----  Contact: Pt  Type: Needs Medical Advice    Who Called:  Pt  Best Call Back Number: [phone]  Additional Information: Requesting a call back regarding  Pt stated he would like to sopeak with nurse regarding pt still not better from seeing Dr. Parry   Please Advise ---Thank you

## 2019-12-12 NOTE — TELEPHONE ENCOUNTER
Patient reports he was seen on 12/6/19 and has slightly improved. Received rx for z-pack, taking as directed but states he is not feeling better. Patient states he has been fatigued, body aches. Denies fever, chills, appetite is normal. Please advise.

## 2019-12-16 NOTE — TELEPHONE ENCOUNTER
"Patient reports he is "feeling alright but still have some gunk from my sinuses and a cough." Offered same day appt for recheck, patient states he just recently woke up and prefers to return call to clinic at a later time regarding an appointment. Advised provider is in clinic until noon and he would need to be seen prior to this time if necessary, patient verbalized understanding.    "

## 2020-02-23 ENCOUNTER — PATIENT MESSAGE (OUTPATIENT)
Dept: FAMILY MEDICINE | Facility: CLINIC | Age: 60
End: 2020-02-23

## 2020-02-23 DIAGNOSIS — E11.65 TYPE 2 DIABETES MELLITUS WITH HYPERGLYCEMIA, WITHOUT LONG-TERM CURRENT USE OF INSULIN: ICD-10-CM

## 2020-02-24 ENCOUNTER — PATIENT MESSAGE (OUTPATIENT)
Dept: FAMILY MEDICINE | Facility: CLINIC | Age: 60
End: 2020-02-24

## 2020-02-24 DIAGNOSIS — E11.65 TYPE 2 DIABETES MELLITUS WITH HYPERGLYCEMIA, WITHOUT LONG-TERM CURRENT USE OF INSULIN: ICD-10-CM

## 2020-03-19 ENCOUNTER — LAB VISIT (OUTPATIENT)
Dept: LAB | Facility: HOSPITAL | Age: 60
End: 2020-03-19
Attending: INTERNAL MEDICINE
Payer: COMMERCIAL

## 2020-03-19 DIAGNOSIS — E11.65 TYPE 2 DIABETES MELLITUS WITH HYPERGLYCEMIA, WITHOUT LONG-TERM CURRENT USE OF INSULIN: ICD-10-CM

## 2020-03-19 LAB
ESTIMATED AVG GLUCOSE: 206 MG/DL (ref 68–131)
HBA1C MFR BLD HPLC: 8.8 % (ref 4–5.6)

## 2020-03-19 PROCEDURE — 83036 HEMOGLOBIN GLYCOSYLATED A1C: CPT

## 2020-03-19 PROCEDURE — 36415 COLL VENOUS BLD VENIPUNCTURE: CPT | Mod: PO

## 2020-03-20 ENCOUNTER — OFFICE VISIT (OUTPATIENT)
Dept: FAMILY MEDICINE | Facility: CLINIC | Age: 60
End: 2020-03-20
Payer: COMMERCIAL

## 2020-03-20 VITALS
HEIGHT: 69 IN | DIASTOLIC BLOOD PRESSURE: 70 MMHG | RESPIRATION RATE: 18 BRPM | WEIGHT: 251.31 LBS | SYSTOLIC BLOOD PRESSURE: 130 MMHG | TEMPERATURE: 98 F | HEART RATE: 91 BPM | BODY MASS INDEX: 37.22 KG/M2

## 2020-03-20 DIAGNOSIS — I10 ESSENTIAL HYPERTENSION: Primary | ICD-10-CM

## 2020-03-20 DIAGNOSIS — E66.01 CLASS 2 SEVERE OBESITY DUE TO EXCESS CALORIES WITH SERIOUS COMORBIDITY AND BODY MASS INDEX (BMI) OF 38.0 TO 38.9 IN ADULT: ICD-10-CM

## 2020-03-20 DIAGNOSIS — Z79.4 TYPE 2 DIABETES MELLITUS WITH HYPERGLYCEMIA, WITH LONG-TERM CURRENT USE OF INSULIN: ICD-10-CM

## 2020-03-20 DIAGNOSIS — E78.5 HYPERLIPIDEMIA, UNSPECIFIED HYPERLIPIDEMIA TYPE: ICD-10-CM

## 2020-03-20 DIAGNOSIS — E11.65 TYPE 2 DIABETES MELLITUS WITH HYPERGLYCEMIA, WITH LONG-TERM CURRENT USE OF INSULIN: ICD-10-CM

## 2020-03-20 DIAGNOSIS — E11.65 TYPE 2 DIABETES MELLITUS WITH HYPERGLYCEMIA, WITHOUT LONG-TERM CURRENT USE OF INSULIN: ICD-10-CM

## 2020-03-20 DIAGNOSIS — Z11.4 SCREENING FOR HIV (HUMAN IMMUNODEFICIENCY VIRUS): ICD-10-CM

## 2020-03-20 DIAGNOSIS — Z12.5 SCREENING FOR PROSTATE CANCER: ICD-10-CM

## 2020-03-20 DIAGNOSIS — D64.9 ANEMIA, UNSPECIFIED TYPE: ICD-10-CM

## 2020-03-20 LAB
ALBUMIN/CREAT UR: 6.1 UG/MG (ref 0–30)
CREAT UR-MCNC: 213 MG/DL (ref 23–375)
MICROALBUMIN UR DL<=1MG/L-MCNC: 13 UG/ML

## 2020-03-20 PROCEDURE — 3052F PR MOST RECENT HEMOGLOBIN A1C LEVEL 8.0 - < 9.0%: ICD-10-PCS | Mod: CPTII,S$GLB,, | Performed by: INTERNAL MEDICINE

## 2020-03-20 PROCEDURE — 3078F PR MOST RECENT DIASTOLIC BLOOD PRESSURE < 80 MM HG: ICD-10-PCS | Mod: CPTII,S$GLB,, | Performed by: INTERNAL MEDICINE

## 2020-03-20 PROCEDURE — 3075F PR MOST RECENT SYSTOLIC BLOOD PRESS GE 130-139MM HG: ICD-10-PCS | Mod: CPTII,S$GLB,, | Performed by: INTERNAL MEDICINE

## 2020-03-20 PROCEDURE — 99214 OFFICE O/P EST MOD 30 MIN: CPT | Mod: S$GLB,,, | Performed by: INTERNAL MEDICINE

## 2020-03-20 PROCEDURE — 3078F DIAST BP <80 MM HG: CPT | Mod: CPTII,S$GLB,, | Performed by: INTERNAL MEDICINE

## 2020-03-20 PROCEDURE — 3075F SYST BP GE 130 - 139MM HG: CPT | Mod: CPTII,S$GLB,, | Performed by: INTERNAL MEDICINE

## 2020-03-20 PROCEDURE — 3052F HG A1C>EQUAL 8.0%<EQUAL 9.0%: CPT | Mod: CPTII,S$GLB,, | Performed by: INTERNAL MEDICINE

## 2020-03-20 PROCEDURE — 99214 PR OFFICE/OUTPT VISIT, EST, LEVL IV, 30-39 MIN: ICD-10-PCS | Mod: S$GLB,,, | Performed by: INTERNAL MEDICINE

## 2020-03-20 PROCEDURE — 3008F PR BODY MASS INDEX (BMI) DOCUMENTED: ICD-10-PCS | Mod: CPTII,S$GLB,, | Performed by: INTERNAL MEDICINE

## 2020-03-20 PROCEDURE — 3008F BODY MASS INDEX DOCD: CPT | Mod: CPTII,S$GLB,, | Performed by: INTERNAL MEDICINE

## 2020-03-20 PROCEDURE — 82043 UR ALBUMIN QUANTITATIVE: CPT

## 2020-03-20 RX ORDER — INSULIN GLARGINE 100 [IU]/ML
INJECTION, SOLUTION SUBCUTANEOUS
Qty: 3 ML | Refills: 6 | Status: SHIPPED | OUTPATIENT
Start: 2020-03-20 | End: 2020-07-23 | Stop reason: SDUPTHER

## 2020-03-20 RX ORDER — PEN NEEDLE, DIABETIC 30 GX3/16"
NEEDLE, DISPOSABLE MISCELLANEOUS
Qty: 100 EACH | Refills: 3 | Status: SHIPPED | OUTPATIENT
Start: 2020-03-20

## 2020-03-20 NOTE — TELEPHONE ENCOUNTER
----- Message from Bekah Aguirre sent at 3/20/2020 11:37 AM CDT -----  Type: Needs Medical Advice    Who Called:  patient  Pharmacy name and phone #:    [id number] - [place], [place] - [address]  [address]  [facility] [address]  Phone: [phone] Fax: [phone]  Best Call Back Number: [phone]  Additional Information: patient had OV today. Patient needs test strips to be sent to his pharmacy. Please give call back

## 2020-03-20 NOTE — PROGRESS NOTES
"Subjective:       Patient ID: Sergo Hollingsworth is a 60 y.o. male.    Medication List with Changes/Refills   New Medications    INSULIN (LANTUS SOLOSTAR U-100 INSULIN) GLARGINE 100 UNITS/ML (3ML) SUBQ PEN    Start at 5 units qhs    PEN NEEDLE, DIABETIC (PEN NEEDLE) 30 GAUGE X 5/16" NDLE    Use once a day with insulin   Current Medications    AMLODIPINE-BENAZEPRIL 5-20 MG (LOTREL) 5-20 MG PER CAPSULE    Take 1 capsule by mouth once daily.    ASPIRIN (ECOTRIN) 81 MG EC TABLET    Take 1 tablet (81 mg total) by mouth once daily.    ATORVASTATIN (LIPITOR) 10 MG TABLET    TAKE 1 TABLET DAILY    BLOOD SUGAR DIAGNOSTIC STRP    One Touch Ultra test strips. Check your glucose qday    BLOOD-GLUCOSE METER (BLOOD GLUCOSE MONITORING) KIT    Use as instructed.  Free Style Whit    METFORMIN (GLUCOPHAGE) 1000 MG TABLET    TAKE 1 TABLET TWICE A DAY WITH MEALS    SITAGLIPTIN (JANUVIA) 100 MG TAB    Take 1 tablet (100 mg total) by mouth once daily.    TRIAMTERENE-HYDROCHLOROTHIAZIDE 37.5-25 MG (DYAZIDE) 37.5-25 MG PER CAPSULE    Take 1 capsule by mouth every morning.   Discontinued Medications    PIOGLITAZONE (ACTOS) 45 MG TABLET    Take 1 tablet (45 mg total) by mouth once daily.       Chief Complaint: Follow-up (Follow up with labs)  He is here today to f/u on chronic medical issues.       He has hypertension dx since 2006. He has no known CAD. He is taking combination benazepril-amlodipine 20-5 mg qday and triamterene-HCTZ 37.5-25 mg qday. He does not check his BP at home. He denies chest pain or shortness of breath.       He has diabetes and is taking metformin 1000 mg twice once a day (he forgets to take his nightly dose) and januvia 100 mg qday and actos 45 mg 1/2 pill qday (he is not taking for last 1 month because he ran out of medication. Recall at full dose actos he gets lower leg swelling. His most recent HbA1c was 8.8 on 3/2020. He does not check his glucose at home. He is UTD on his foot exam and his eye exam is scheduled " in April. His microalbumin is negative on 3/2019 and he is taking an ACEI.  He denies any neuropathy or vision issues.      He has hyperlipidemia and is on atorvastatin 10 mg qday with last lipids on 7/2019 were 146/94/49/78.  He does take an aspirin daily.      He has mild anemia with normal indices on labs on 7/2019 with H+H of 12.4/38.5. He has had mild anemia about 2 years ago that resolved. He denies any blood in his stool. His last colonoscopy was on 6/2017 was normal.       He stays very active. He is not careful with his diet and does not exercise.  He continues to chew tobacco and does not want to quit.       Colonoscopy----6/2017 repeat in 10 years  Tdap---4/2017  Pneumovax--4/2017  Influenza vaccine---refuses  Shingles vaccine---none      Review of Systems   Constitutional: Negative for appetite change, fatigue, fever and unexpected weight change.   HENT: Negative for congestion, ear pain, hearing loss, sore throat and trouble swallowing.    Eyes: Negative for pain and visual disturbance.   Respiratory: Negative for cough, chest tightness, shortness of breath and wheezing.    Cardiovascular: Negative for chest pain, palpitations and leg swelling.   Gastrointestinal: Positive for diarrhea. Negative for abdominal pain, blood in stool, constipation, nausea and vomiting.   Endocrine: Positive for polyphagia and polyuria. Negative for polydipsia.   Genitourinary: Negative for dysuria and hematuria.   Musculoskeletal: Positive for back pain. Negative for arthralgias and myalgias.   Skin: Negative for pallor and rash.   Neurological: Negative for dizziness, tremors, seizures, speech difficulty, weakness, numbness and headaches.   Hematological: Does not bruise/bleed easily.   Psychiatric/Behavioral: Negative for confusion, dysphoric mood, sleep disturbance and suicidal ideas. The patient is not nervous/anxious.        Objective:      Vitals:    03/20/20 0749   BP: 130/70   Pulse: 91   Resp: 18   Temp: 97.8 °F  "(36.6 °C)   TempSrc: Oral   Weight: 114 kg (251 lb 5.2 oz)   Height: 5' 9" (1.753 m)     Body mass index is 37.11 kg/m².  Physical Exam    General appearance: No acute distress, cooperative  Eyes: PERRL, EOMI, conjunctiva clear  HEENT: ears normal, nose without rhinorrhea, normal turbinates,mouth no sores or lesions, throat no erythema or pus  Neck: FROM, soft, supple, no thyromegaly, no bruits  Lymph: no anterior or posterior cervical adenopathy  Heart::  Regular rate and rhythm, no murmur  Lung: Clear to ascultation bilaterally, no wheezing, no rales, no rhonchi, no distress  Abdomen: Soft, nontender, no distention, no hepatosplenomegaly, bowel sounds normal, no guarding, no rebound, no peritoneal signs  Skin: no rashes, no lesions  Extremities: no edema, no cyanosis  Neuro: no focal abnormalities, strength 5/5 b/l UE and LE, 2+ DTRs b/l UE and LE, normal gait  Peripheral pulses: 2+ pedal pulses bilaterally, good perfusion and color  Musculoskeletal: FROM, good strenth, no tenderness  Joint: normal appearance, no swelling, no warmth, no deformity in all joints    Assessment:       1. Essential hypertension    2. Hyperlipidemia, unspecified hyperlipidemia type    3. Type 2 diabetes mellitus with hyperglycemia, with long-term current use of insulin    4. Anemia, unspecified type    5. Class 2 severe obesity due to excess calories with serious comorbidity and body mass index (BMI) of 38.0 to 38.9 in adult    6. Screening for HIV (human immunodeficiency virus)    7. Screening for prostate cancer        Plan:       Essential hypertension  Well controlled and continue current regimen. He is due for labs prior to his next chronic care visit.   -     TSH; Future; Expected date: 07/01/2020  -     CBC auto differential; Future; Expected date: 07/01/2020  -     Comprehensive metabolic panel; Future; Expected date: 07/01/2020  -     Lipid panel; Future; Expected date: 07/01/2020    Hyperlipidemia, unspecified hyperlipidemia " "type  Good control on atorvastatin.     Type 2 diabetes mellitus with hyperglycemia, with long-term current use of insulin  Uncontrolled and after long discussion will stop actos and continue metformin at 1000 mg qam (he does get some diarrhea with higher doses).  Continue januvia 100 mg qday and start lantus 5 units qhs.  He will call with readings in 3-5 days and then f/u with me by telemedicine in 2 weeks. Will check microalbumin today. He is scheduled for an eye exam.   -     insulin (LANTUS SOLOSTAR U-100 INSULIN) glargine 100 units/mL (3mL) SubQ pen; Start at 5 units qhs  Dispense: 3 mL; Refill: 6  -     Hemoglobin A1c; Future; Expected date: 07/01/2020  -     Microalbumin/creatinine urine ratio  -     pen needle, diabetic (PEN NEEDLE) 30 gauge x 5/16" Ndle; Use once a day with insulin  Dispense: 100 each; Refill: 3    Anemia, unspecified type  Stable and continue to follow.     Class 2 severe obesity due to excess calories with serious comorbidity and body mass index (BMI) of 38.0 to 38.9 in adult  Long discussion on the benefits of healthy eating and regular exercise to help lose weight and help control diabetes and hyperlipidemia.     Screening for HIV (human immunodeficiency virus)  -     HIV 1/2 Ag/Ab (4th Gen); Future; Expected date: 07/01/2020    Screening for prostate cancer  -     PSA, Screening; Future; Expected date: 03/20/2020    Follow up in about 2 weeks (around 4/3/2020) for telemedicine visit to recheck diabetes and 4 months chronic medical issues.      "

## 2020-03-31 ENCOUNTER — PATIENT MESSAGE (OUTPATIENT)
Dept: FAMILY MEDICINE | Facility: CLINIC | Age: 60
End: 2020-03-31

## 2020-04-02 ENCOUNTER — DOCUMENTATION ONLY (OUTPATIENT)
Dept: FAMILY MEDICINE | Facility: CLINIC | Age: 60
End: 2020-04-02

## 2020-04-03 ENCOUNTER — OFFICE VISIT (OUTPATIENT)
Dept: FAMILY MEDICINE | Facility: CLINIC | Age: 60
End: 2020-04-03
Payer: COMMERCIAL

## 2020-04-03 DIAGNOSIS — E11.65 TYPE 2 DIABETES MELLITUS WITH HYPERGLYCEMIA, WITHOUT LONG-TERM CURRENT USE OF INSULIN: ICD-10-CM

## 2020-04-03 PROCEDURE — 3052F HG A1C>EQUAL 8.0%<EQUAL 9.0%: CPT | Mod: CPTII,,, | Performed by: INTERNAL MEDICINE

## 2020-04-03 PROCEDURE — 3052F PR MOST RECENT HEMOGLOBIN A1C LEVEL 8.0 - < 9.0%: ICD-10-PCS | Mod: CPTII,,, | Performed by: INTERNAL MEDICINE

## 2020-04-03 PROCEDURE — 99213 PR OFFICE/OUTPT VISIT, EST, LEVL III, 20-29 MIN: ICD-10-PCS | Mod: 95,,, | Performed by: INTERNAL MEDICINE

## 2020-04-03 PROCEDURE — 99213 OFFICE O/P EST LOW 20 MIN: CPT | Mod: 95,,, | Performed by: INTERNAL MEDICINE

## 2020-04-03 NOTE — PROGRESS NOTES
"Subjective:       Patient ID: Sergo Hollingsworth is a 60 y.o. male.    Medication List with Changes/Refills   Current Medications    AMLODIPINE-BENAZEPRIL 5-20 MG (LOTREL) 5-20 MG PER CAPSULE    Take 1 capsule by mouth once daily.    ASPIRIN (ECOTRIN) 81 MG EC TABLET    Take 1 tablet (81 mg total) by mouth once daily.    ATORVASTATIN (LIPITOR) 10 MG TABLET    TAKE 1 TABLET DAILY    BLOOD SUGAR DIAGNOSTIC STRP    One Touch Ultra test strips. Check your glucose qday    BLOOD-GLUCOSE METER (BLOOD GLUCOSE MONITORING) KIT    Use as instructed.  Free Style Whit    INSULIN (LANTUS SOLOSTAR U-100 INSULIN) GLARGINE 100 UNITS/ML (3ML) SUBQ PEN    Start at 5 units qhs    METFORMIN (GLUCOPHAGE) 1000 MG TABLET    TAKE 1 TABLET TWICE A DAY WITH MEALS    PEN NEEDLE, DIABETIC (PEN NEEDLE) 30 GAUGE X 5/16" NDLE    Use once a day with insulin    TRIAMTERENE-HYDROCHLOROTHIAZIDE 37.5-25 MG (DYAZIDE) 37.5-25 MG PER CAPSULE    Take 1 capsule by mouth every morning.   Changed and/or Refilled Medications    Modified Medication Previous Medication    SITAGLIPTIN (JANUVIA) 100 MG TAB SITagliptin (JANUVIA) 100 MG Tab       Take 1 tablet (100 mg total) by mouth once daily.    Take 1 tablet (100 mg total) by mouth once daily.       Chief Complaint: No chief complaint on file.  The patient location is: home  The chief complaint leading to consultation is: diabetes follow up  Visit type: Virtual visit with synchronous audio and video  Total time spent with patient: 15 minutes  Each patient to whom he or she provides medical services by telemedicine is:  (1) informed of the relationship between the physician and patient and the respective role of any other health care provider with respect to management of the patient; and (2) notified that he or she may decline to receive medical services by telemedicine and may withdraw from such care at any time.    He was seen on 3/20/2020 for uncontrolled diabetes and was started on lantus 5 units.  Due to " elevated am glucose he has increased his lantus dose to now 10 units. Glucose for the last 3 days were 179,140,157. He continued on metformin but due to confusion he stopped taking januvia.      Review of Systems   Constitutional: Negative for activity change, appetite change, chills, fatigue and fever.   HENT: Negative for congestion, ear discharge, ear pain, mouth sores, postnasal drip, rhinorrhea, sinus pressure and sore throat.    Eyes: Negative for pain, discharge and redness.   Respiratory: Negative for cough, chest tightness, shortness of breath and wheezing.    Gastrointestinal: Negative for abdominal pain, constipation, diarrhea, nausea and vomiting.   Genitourinary: Negative for dysuria.   Musculoskeletal: Negative for arthralgias and neck stiffness.   Skin: Negative for rash.   Neurological: Negative for headaches.   Hematological: Negative for adenopathy.       Objective:      There were no vitals filed for this visit.  There is no height or weight on file to calculate BMI.  Physical Exam    Virtual visit  Alert, no distress  Assessment:       1. Type 2 diabetes mellitus with hyperglycemia, without long-term current use of insulin        Plan:       Type 2 diabetes mellitus with hyperglycemia, without long-term current use of insulin  Uncontrolled due to misunderstanding about medication. Advised to restart januvia, continue metformin and continue lantus at 10 units qday. Continue to check glucose qam and adjust insulin down if needed for hypoglycemia or increase if glucose > 130 for 3 days.  Long discussion on how lantus as a basal insulin works and januvia is taking care of mealtime sugar spikes.  He now understands. He will email me readings in one week.   -     SITagliptin (JANUVIA) 100 MG Tab; Take 1 tablet (100 mg total) by mouth once daily.  Dispense: 90 tablet; Refill: 3    Follow up for email through [employer] his readings in one week.

## 2020-04-28 ENCOUNTER — OFFICE VISIT (OUTPATIENT)
Dept: FAMILY MEDICINE | Facility: CLINIC | Age: 60
End: 2020-04-28
Payer: COMMERCIAL

## 2020-04-28 ENCOUNTER — TELEPHONE (OUTPATIENT)
Dept: FAMILY MEDICINE | Facility: CLINIC | Age: 60
End: 2020-04-28

## 2020-04-28 ENCOUNTER — PATIENT MESSAGE (OUTPATIENT)
Dept: FAMILY MEDICINE | Facility: CLINIC | Age: 60
End: 2020-04-28

## 2020-04-28 ENCOUNTER — PATIENT OUTREACH (OUTPATIENT)
Dept: ADMINISTRATIVE | Facility: HOSPITAL | Age: 60
End: 2020-04-28

## 2020-04-28 DIAGNOSIS — E11.65 TYPE 2 DIABETES MELLITUS WITH HYPERGLYCEMIA, WITHOUT LONG-TERM CURRENT USE OF INSULIN: Primary | ICD-10-CM

## 2020-04-28 PROCEDURE — 3052F HG A1C>EQUAL 8.0%<EQUAL 9.0%: CPT | Mod: CPTII,,, | Performed by: INTERNAL MEDICINE

## 2020-04-28 PROCEDURE — 3052F PR MOST RECENT HEMOGLOBIN A1C LEVEL 8.0 - < 9.0%: ICD-10-PCS | Mod: CPTII,,, | Performed by: INTERNAL MEDICINE

## 2020-04-28 PROCEDURE — 99213 PR OFFICE/OUTPT VISIT, EST, LEVL III, 20-29 MIN: ICD-10-PCS | Mod: 95,,, | Performed by: INTERNAL MEDICINE

## 2020-04-28 PROCEDURE — 99213 OFFICE O/P EST LOW 20 MIN: CPT | Mod: 95,,, | Performed by: INTERNAL MEDICINE

## 2020-04-28 NOTE — PROGRESS NOTES
"Subjective:       Patient ID: Sergo Hollingsworth is a 60 y.o. male.    Medication List with Changes/Refills   Current Medications    AMLODIPINE-BENAZEPRIL 5-20 MG (LOTREL) 5-20 MG PER CAPSULE    Take 1 capsule by mouth once daily.    ASPIRIN (ECOTRIN) 81 MG EC TABLET    Take 1 tablet (81 mg total) by mouth once daily.    ATORVASTATIN (LIPITOR) 10 MG TABLET    TAKE 1 TABLET DAILY    BLOOD SUGAR DIAGNOSTIC STRP    One Touch Ultra test strips. Check your glucose qday    BLOOD-GLUCOSE METER (BLOOD GLUCOSE MONITORING) KIT    Use as instructed.  Free Style Whit    INSULIN (LANTUS SOLOSTAR U-100 INSULIN) GLARGINE 100 UNITS/ML (3ML) SUBQ PEN    Start at 5 units qhs    METFORMIN (GLUCOPHAGE) 1000 MG TABLET    TAKE 1 TABLET TWICE A DAY WITH MEALS    PEN NEEDLE, DIABETIC (PEN NEEDLE) 30 GAUGE X 5/16" NDLE    Use once a day with insulin    SITAGLIPTIN (JANUVIA) 100 MG TAB    Take 1 tablet (100 mg total) by mouth once daily.    TRIAMTERENE-HYDROCHLOROTHIAZIDE 37.5-25 MG (DYAZIDE) 37.5-25 MG PER CAPSULE    Take 1 capsule by mouth every morning.       Chief Complaint: No chief complaint on file.  The patient location is: home  The chief complaint leading to consultation is: diabetes  Visit type: audiovisual  Total time spent with patient: 15 minutes  Each patient to whom he or she provides medical services by telemedicine is:  (1) informed of the relationship between the physician and patient and the respective role of any other health care provider with respect to management of the patient; and (2) notified that he or she may decline to receive medical services by telemedicine and may withdraw from such care at any time.    He would like to discuss his diabetes.   He was started on insulin in March and then medication was adjusted two weeks ago. He is currently taking januvia 100 mg qday, metformin 1000 mg qday and lantus 20 units nightly. He reports his am glucose run 148, 160, 150, 140, 138.  He denies any lows or symptoms of " lows. He is sleeping unusual hours (sleep at 2 am and wake at 11 am). He is only eating one meal a day at dinner which is a large meal. He does snack at night while he watches TV. He does not eat breakfast or lunch and has lost 11 lbs since his last appt.  His last HbA1c was 8.8 on 3/19/2020 which is when insulin was started.  He is getting nervous about the amount of insulin he is taking.        Review of Systems   Constitutional: Negative for activity change, appetite change, chills, fatigue and fever.   HENT: Negative for congestion, ear discharge, ear pain, mouth sores, postnasal drip, rhinorrhea, sinus pressure and sore throat.    Eyes: Negative for pain, discharge and redness.   Respiratory: Negative for cough, chest tightness, shortness of breath and wheezing.    Gastrointestinal: Negative for abdominal pain, constipation, diarrhea, nausea and vomiting.   Genitourinary: Negative for dysuria.   Musculoskeletal: Negative for arthralgias and neck stiffness.   Skin: Negative for rash.   Neurological: Negative for headaches.   Hematological: Negative for adenopathy.       Objective:      There were no vitals filed for this visit.  There is no height or weight on file to calculate BMI.  Physical Exam    Alert, no distress  No respiratory distress, equal lung expansion    Assessment:       1. Type 2 diabetes mellitus with hyperglycemia, without long-term current use of insulin        Plan:       Type 2 diabetes mellitus with hyperglycemia, without long-term current use of insulin  Reassurance given. I am okay with taking insulin in the am. I would like him to check his glucose if he feels shaky or very hungry or if restless at night to monitor for lows.  I encouraged him to check his am fasting glucose. In encouraged him to eat three small meals a day rather than 1 large meal with multiple snack.  He will email me his readings with this increased surveillance.  He is already scheduled for repeat HbA1c and f/u appt.      Follow up for already scheduled.

## 2020-04-28 NOTE — Clinical Note
He already has a f/u appt in JulyPlease schedule labs (PSA, lipids, TSH, CMP, CBC, HbA1c, HIV) prior to that appt

## 2020-04-28 NOTE — PROGRESS NOTES
Chart review completed 04/28/2020.  Care Everywhere updates requested and reviewed.  Immunizations reconciled. Media reviewed.           Eye exam scheduled

## 2020-04-28 NOTE — TELEPHONE ENCOUNTER
----- Message from Kiah Parry DO sent at 4/28/2020  9:37 AM CDT -----  He already has a f/u appt in July  Please schedule labs (PSA, lipids, TSH, CMP, CBC, HbA1c, HIV) prior to that appt

## 2020-05-05 ENCOUNTER — PATIENT MESSAGE (OUTPATIENT)
Dept: ADMINISTRATIVE | Facility: HOSPITAL | Age: 60
End: 2020-05-05

## 2020-06-02 DIAGNOSIS — E11.65 TYPE 2 DIABETES MELLITUS WITH HYPERGLYCEMIA, WITHOUT LONG-TERM CURRENT USE OF INSULIN: ICD-10-CM

## 2020-07-09 ENCOUNTER — PATIENT OUTREACH (OUTPATIENT)
Dept: ADMINISTRATIVE | Facility: HOSPITAL | Age: 60
End: 2020-07-09

## 2020-07-09 NOTE — PROGRESS NOTES
Chart review completed 07/09/2020  Care Everywhere updates requested and reviewed.  Immunizations reconciled. Media reviewed.     Health Maintenance Due   Topic Date Due    HIV Screening  02/14/1973    Shingles Vaccine (1 of 2) 02/14/2008    Eye Exam  03/12/2020    Hemoglobin A1c  06/19/2020

## 2020-07-20 ENCOUNTER — LAB VISIT (OUTPATIENT)
Dept: LAB | Facility: HOSPITAL | Age: 60
End: 2020-07-20
Attending: INTERNAL MEDICINE
Payer: COMMERCIAL

## 2020-07-20 DIAGNOSIS — Z11.4 SCREENING FOR HIV (HUMAN IMMUNODEFICIENCY VIRUS): ICD-10-CM

## 2020-07-20 DIAGNOSIS — Z12.5 SCREENING FOR PROSTATE CANCER: ICD-10-CM

## 2020-07-20 DIAGNOSIS — E11.65 TYPE 2 DIABETES MELLITUS WITH HYPERGLYCEMIA, WITH LONG-TERM CURRENT USE OF INSULIN: ICD-10-CM

## 2020-07-20 DIAGNOSIS — I10 ESSENTIAL HYPERTENSION: ICD-10-CM

## 2020-07-20 DIAGNOSIS — Z79.4 TYPE 2 DIABETES MELLITUS WITH HYPERGLYCEMIA, WITH LONG-TERM CURRENT USE OF INSULIN: ICD-10-CM

## 2020-07-20 LAB
ALBUMIN SERPL BCP-MCNC: 3.9 G/DL (ref 3.5–5.2)
ALP SERPL-CCNC: 53 U/L (ref 55–135)
ALT SERPL W/O P-5'-P-CCNC: 24 U/L (ref 10–44)
ANION GAP SERPL CALC-SCNC: 10 MMOL/L (ref 8–16)
AST SERPL-CCNC: 21 U/L (ref 10–40)
BASOPHILS # BLD AUTO: 0.07 K/UL (ref 0–0.2)
BASOPHILS NFR BLD: 1.1 % (ref 0–1.9)
BILIRUB SERPL-MCNC: 0.6 MG/DL (ref 0.1–1)
BUN SERPL-MCNC: 18 MG/DL (ref 8–23)
CALCIUM SERPL-MCNC: 9.1 MG/DL (ref 8.7–10.5)
CHLORIDE SERPL-SCNC: 104 MMOL/L (ref 95–110)
CHOLEST SERPL-MCNC: 107 MG/DL (ref 120–199)
CHOLEST/HDLC SERPL: 2.5 {RATIO} (ref 2–5)
CO2 SERPL-SCNC: 25 MMOL/L (ref 23–29)
COMPLEXED PSA SERPL-MCNC: 0.26 NG/ML (ref 0–4)
CREAT SERPL-MCNC: 1.4 MG/DL (ref 0.5–1.4)
DIFFERENTIAL METHOD: ABNORMAL
EOSINOPHIL # BLD AUTO: 0.2 K/UL (ref 0–0.5)
EOSINOPHIL NFR BLD: 2.8 % (ref 0–8)
ERYTHROCYTE [DISTWIDTH] IN BLOOD BY AUTOMATED COUNT: 12.4 % (ref 11.5–14.5)
EST. GFR  (AFRICAN AMERICAN): >60 ML/MIN/1.73 M^2
EST. GFR  (NON AFRICAN AMERICAN): 53.5 ML/MIN/1.73 M^2
ESTIMATED AVG GLUCOSE: 163 MG/DL (ref 68–131)
GLUCOSE SERPL-MCNC: 177 MG/DL (ref 70–110)
HBA1C MFR BLD HPLC: 7.3 % (ref 4–5.6)
HCT VFR BLD AUTO: 38.9 % (ref 40–54)
HDLC SERPL-MCNC: 42 MG/DL (ref 40–75)
HDLC SERPL: 39.3 % (ref 20–50)
HGB BLD-MCNC: 12.6 G/DL (ref 14–18)
IMM GRANULOCYTES # BLD AUTO: 0.02 K/UL (ref 0–0.04)
IMM GRANULOCYTES NFR BLD AUTO: 0.3 % (ref 0–0.5)
LDLC SERPL CALC-MCNC: 46.4 MG/DL (ref 63–159)
LYMPHOCYTES # BLD AUTO: 1.7 K/UL (ref 1–4.8)
LYMPHOCYTES NFR BLD: 25.6 % (ref 18–48)
MCH RBC QN AUTO: 30 PG (ref 27–31)
MCHC RBC AUTO-ENTMCNC: 32.4 G/DL (ref 32–36)
MCV RBC AUTO: 93 FL (ref 82–98)
MONOCYTES # BLD AUTO: 0.6 K/UL (ref 0.3–1)
MONOCYTES NFR BLD: 9.6 % (ref 4–15)
NEUTROPHILS # BLD AUTO: 3.9 K/UL (ref 1.8–7.7)
NEUTROPHILS NFR BLD: 60.6 % (ref 38–73)
NONHDLC SERPL-MCNC: 65 MG/DL
NRBC BLD-RTO: 0 /100 WBC
PLATELET # BLD AUTO: 258 K/UL (ref 150–350)
PMV BLD AUTO: 11.2 FL (ref 9.2–12.9)
POTASSIUM SERPL-SCNC: 3.9 MMOL/L (ref 3.5–5.1)
PROT SERPL-MCNC: 7.2 G/DL (ref 6–8.4)
RBC # BLD AUTO: 4.2 M/UL (ref 4.6–6.2)
SODIUM SERPL-SCNC: 139 MMOL/L (ref 136–145)
TRIGL SERPL-MCNC: 93 MG/DL (ref 30–150)
TSH SERPL DL<=0.005 MIU/L-ACNC: 1.69 UIU/ML (ref 0.4–4)
WBC # BLD AUTO: 6.44 K/UL (ref 3.9–12.7)

## 2020-07-20 PROCEDURE — 36415 COLL VENOUS BLD VENIPUNCTURE: CPT | Mod: PO

## 2020-07-20 PROCEDURE — 84153 ASSAY OF PSA TOTAL: CPT

## 2020-07-20 PROCEDURE — 85025 COMPLETE CBC W/AUTO DIFF WBC: CPT

## 2020-07-20 PROCEDURE — 84443 ASSAY THYROID STIM HORMONE: CPT

## 2020-07-20 PROCEDURE — 83036 HEMOGLOBIN GLYCOSYLATED A1C: CPT

## 2020-07-20 PROCEDURE — 80053 COMPREHEN METABOLIC PANEL: CPT

## 2020-07-20 PROCEDURE — 86703 HIV-1/HIV-2 1 RESULT ANTBDY: CPT

## 2020-07-20 PROCEDURE — 80061 LIPID PANEL: CPT

## 2020-07-21 LAB — HIV 1+2 AB+HIV1 P24 AG SERPL QL IA: NEGATIVE

## 2020-07-23 ENCOUNTER — OFFICE VISIT (OUTPATIENT)
Dept: FAMILY MEDICINE | Facility: CLINIC | Age: 60
End: 2020-07-23
Payer: COMMERCIAL

## 2020-07-23 ENCOUNTER — TELEPHONE (OUTPATIENT)
Dept: FAMILY MEDICINE | Facility: CLINIC | Age: 60
End: 2020-07-23

## 2020-07-23 VITALS
DIASTOLIC BLOOD PRESSURE: 78 MMHG | TEMPERATURE: 98 F | HEART RATE: 81 BPM | WEIGHT: 244.19 LBS | BODY MASS INDEX: 36.17 KG/M2 | HEIGHT: 69 IN | SYSTOLIC BLOOD PRESSURE: 134 MMHG | RESPIRATION RATE: 18 BRPM

## 2020-07-23 DIAGNOSIS — I10 ESSENTIAL HYPERTENSION: Primary | ICD-10-CM

## 2020-07-23 DIAGNOSIS — Z79.4 TYPE 2 DIABETES MELLITUS WITH HYPERGLYCEMIA, WITH LONG-TERM CURRENT USE OF INSULIN: ICD-10-CM

## 2020-07-23 DIAGNOSIS — E66.09 CLASS 2 OBESITY DUE TO EXCESS CALORIES WITH BODY MASS INDEX (BMI) OF 36.0 TO 36.9 IN ADULT, UNSPECIFIED WHETHER SERIOUS COMORBIDITY PRESENT: ICD-10-CM

## 2020-07-23 DIAGNOSIS — D64.9 ANEMIA, UNSPECIFIED TYPE: ICD-10-CM

## 2020-07-23 DIAGNOSIS — E11.65 TYPE 2 DIABETES MELLITUS WITH HYPERGLYCEMIA, WITH LONG-TERM CURRENT USE OF INSULIN: ICD-10-CM

## 2020-07-23 DIAGNOSIS — E78.5 HYPERLIPIDEMIA, UNSPECIFIED HYPERLIPIDEMIA TYPE: ICD-10-CM

## 2020-07-23 DIAGNOSIS — R79.89 ELEVATED SERUM CREATININE: ICD-10-CM

## 2020-07-23 PROCEDURE — 3075F PR MOST RECENT SYSTOLIC BLOOD PRESS GE 130-139MM HG: ICD-10-PCS | Mod: CPTII,S$GLB,, | Performed by: INTERNAL MEDICINE

## 2020-07-23 PROCEDURE — 3078F DIAST BP <80 MM HG: CPT | Mod: CPTII,S$GLB,, | Performed by: INTERNAL MEDICINE

## 2020-07-23 PROCEDURE — 3051F PR MOST RECENT HEMOGLOBIN A1C LEVEL 7.0 - < 8.0%: ICD-10-PCS | Mod: CPTII,S$GLB,, | Performed by: INTERNAL MEDICINE

## 2020-07-23 PROCEDURE — 3008F PR BODY MASS INDEX (BMI) DOCUMENTED: ICD-10-PCS | Mod: CPTII,S$GLB,, | Performed by: INTERNAL MEDICINE

## 2020-07-23 PROCEDURE — 99214 PR OFFICE/OUTPT VISIT, EST, LEVL IV, 30-39 MIN: ICD-10-PCS | Mod: S$GLB,,, | Performed by: INTERNAL MEDICINE

## 2020-07-23 PROCEDURE — 3051F HG A1C>EQUAL 7.0%<8.0%: CPT | Mod: CPTII,S$GLB,, | Performed by: INTERNAL MEDICINE

## 2020-07-23 PROCEDURE — 3008F BODY MASS INDEX DOCD: CPT | Mod: CPTII,S$GLB,, | Performed by: INTERNAL MEDICINE

## 2020-07-23 PROCEDURE — 3078F PR MOST RECENT DIASTOLIC BLOOD PRESSURE < 80 MM HG: ICD-10-PCS | Mod: CPTII,S$GLB,, | Performed by: INTERNAL MEDICINE

## 2020-07-23 PROCEDURE — 99214 OFFICE O/P EST MOD 30 MIN: CPT | Mod: S$GLB,,, | Performed by: INTERNAL MEDICINE

## 2020-07-23 PROCEDURE — 3075F SYST BP GE 130 - 139MM HG: CPT | Mod: CPTII,S$GLB,, | Performed by: INTERNAL MEDICINE

## 2020-07-23 RX ORDER — INSULIN GLARGINE 100 [IU]/ML
20 INJECTION, SOLUTION SUBCUTANEOUS NIGHTLY
Qty: 2 BOX | Refills: 3 | Status: SHIPPED | OUTPATIENT
Start: 2020-07-23

## 2020-07-23 NOTE — TELEPHONE ENCOUNTER
----- Message from Jo Pimentel sent at 7/23/2020  7:46 AM CDT -----  Regarding: Running late due to overslept  Type:  Needs Medical Advice    Who Called: Sergo Hollingsworth  Would the patient rather a call back or a response via [facility]? None  Best Call Back Number: [phone]  Additional Information: Pt asked to let office know he is in route overslept and is on the way about 7 mins away

## 2020-07-23 NOTE — PROGRESS NOTES
"Subjective:       Patient ID: Sergo Hollingsworth is a 60 y.o. male.    Medication List with Changes/Refills   Current Medications    AMLODIPINE-BENAZEPRIL 5-20 MG (LOTREL) 5-20 MG PER CAPSULE    Take 1 capsule by mouth once daily.    ASPIRIN (ECOTRIN) 81 MG EC TABLET    Take 1 tablet (81 mg total) by mouth once daily.    ATORVASTATIN (LIPITOR) 10 MG TABLET    TAKE 1 TABLET DAILY    BLOOD SUGAR DIAGNOSTIC STRP    One Touch Ultra test strips. Check your glucose qday    BLOOD-GLUCOSE METER (BLOOD GLUCOSE MONITORING) KIT    Use as instructed.  Free Style Whit    METFORMIN (GLUCOPHAGE) 1000 MG TABLET    TAKE 1 TABLET TWICE A DAY WITH MEALS    PEN NEEDLE, DIABETIC (PEN NEEDLE) 30 GAUGE X 5/16" NDLE    Use once a day with insulin    SITAGLIPTIN (JANUVIA) 100 MG TAB    Take 1 tablet (100 mg total) by mouth once daily.    TRIAMTERENE-HYDROCHLOROTHIAZIDE 37.5-25 MG (DYAZIDE) 37.5-25 MG PER CAPSULE    Take 1 capsule by mouth every morning.   Changed and/or Refilled Medications    Modified Medication Previous Medication    INSULIN (LANTUS SOLOSTAR U-100 INSULIN) GLARGINE 100 UNITS/ML (3ML) SUBQ PEN insulin (LANTUS SOLOSTAR U-100 INSULIN) glargine 100 units/mL (3mL) SubQ pen       Inject 20 Units into the skin every evening.    Start at 5 units qhs       Chief Complaint: Follow-up  He is here today to f/u on chronic medical issues.       He has hypertension dx since 2006. He has no known CAD. He is taking combination benazepril-amlodipine 20-5 mg qday and triamterene-HCTZ 37.5-25 mg qday. He does not check his BP at home. He denies chest pain or shortness of breath.       He has diabetes and is taking metformin 1000 mg qday, januvia 100 mg qday and lantus 20 units qhs. He was unable to tolerate actos due to lower leg swelling. His most recent HbA1c was 7.3 on 7/2020.  He does not regularly check his glucose at home. He denies any symptoms of hypoglycemia.  He is UTD on his foot exam and his eye exam is scheduled in August. His " microalbumin is negative on 3/2020 and he is taking an ACEI.  He denies any neuropathy or vision issues.      He has hyperlipidemia and is on atorvastatin 10 mg qday with last lipids on 7/2020 showed 107/93/42/46.  He does take an aspirin daily.      He has mild anemia with normal indices on labs on 7/2019 with H+H of 12.4/38.5. He has had mild anemia about 2 years ago that resolved. He denies any blood in his stool. His last colonoscopy was on 6/2017 was normal.  Repeat H+H on 7/2020 showed 12.6/38.      On recent labs from 7/2020 he was noted to have increased creatinine to 1.4 with GFR of 53. His baseline creatinine is 1.1.       He lives with his wife and feels safe at home. He stays very active. He has been eating healthy with smaller portion sizes and has lost over 20 lbs in last 9 months. He does not exercise.  He continues to chew tobacco and does not want to quit.       Colonoscopy----6/2017 repeat in 10 years  Tdap---4/2017  Pneumovax--4/2017  Influenza vaccine---refuses  Shingles vaccine---none       Review of Systems   Constitutional: Negative for appetite change, fatigue, fever and unexpected weight change.   HENT: Negative for congestion, ear pain, hearing loss, sore throat and trouble swallowing.    Eyes: Positive for visual disturbance. Negative for pain.   Respiratory: Negative for cough, chest tightness, shortness of breath and wheezing.    Cardiovascular: Negative for chest pain, palpitations and leg swelling.   Gastrointestinal: Negative for abdominal pain, blood in stool, constipation, diarrhea, nausea and vomiting.   Endocrine: Negative for polydipsia, polyphagia and polyuria.   Genitourinary: Negative for dysuria and hematuria.   Musculoskeletal: Negative for arthralgias, back pain and myalgias.   Skin: Negative for pallor and rash.   Neurological: Negative for dizziness, tremors, seizures, speech difficulty, weakness, numbness and headaches.   Hematological: Does not bruise/bleed easily.  "  Psychiatric/Behavioral: Negative for confusion, dysphoric mood, sleep disturbance and suicidal ideas. The patient is not nervous/anxious.        Objective:      Vitals:    07/23/20 0802 07/23/20 0819   BP: (!) 140/84 134/78   BP Location: Right arm    Pulse: 81    Resp: 18    Temp: 97.9 °F (36.6 °C)    TempSrc: Tympanic    Weight: 110.7 kg (244 lb 2.6 oz)    Height: 5' 9" (1.753 m)      Body mass index is 36.06 kg/m².  Physical Exam    General appearance: No acute distress, cooperative  Eyes: PERRL, EOMI, conjunctiva clear  Ears: normal external ear and pinna, tm clear without drainage, canals clear  Nose: Normal mucosa without drainage  Throat: no exudates or erythema, tonsils not enlarged  Mouth: no sores or lesions, moist mucous membranes  Neck: FROM, soft, supple, no thyromegaly, no bruits  Lymph: no anterior or posterior cervical adenopathy  Heart::  Regular rate and rhythm, no murmur  Lung: Clear to ascultation bilaterally, no wheezing, no rales, no rhonchi, no distress  Abdomen: Soft, nontender, no distention, no hepatosplenomegaly, bowel sounds normal, no guarding, no rebound, no peritoneal signs  Skin: no rashes, no lesions  Extremities: no edema, no cyanosis  Neuro: CN 2-12 intact, 5/5 muscle strength upper and lower extremity bilaterally, 2+ DTRs UE and LE bilaterally, normal gait  Peripheral pulses: 2+ pedal pulses bilaterally, good perfusion and color  Musculoskeletal: FROM, good strenth, no tenderness  Joint: normal appearance, no swelling, no warmth, no deformity in all joints      Assessment:       1. Essential hypertension    2. Hyperlipidemia, unspecified hyperlipidemia type    3. Type 2 diabetes mellitus with hyperglycemia, with long-term current use of insulin    4. Anemia, unspecified type    5. Elevated serum creatinine    6. Class 2 obesity due to excess calories with body mass index (BMI) of 36.0 to 36.9 in adult, unspecified whether serious comorbidity present        Plan:       Essential " hypertension  Good control on this regimen.     Hyperlipidemia, unspecified hyperlipidemia type  He is doing well on atorvastatin. He is on aspirin daily.     Type 2 diabetes mellitus with hyperglycemia, with long-term current use of insulin  Much improved and well controlled with the addition of insulin. He has upcoming eye exam and foot exam is UTD.   -     insulin (LANTUS SOLOSTAR U-100 INSULIN) glargine 100 units/mL (3mL) SubQ pen; Inject 20 Units into the skin every evening.  Dispense: 2 Box; Refill: 3  -     Basic metabolic panel; Future; Expected date: 10/23/2020  -     Hemoglobin A1C; Future; Expected date: 10/23/2020    Anemia, unspecified type  Stable and continue to monitor.     Elevated serum creatinine  This is a new finding. Will repeat and if still elevated consider renal u/s.     Class 2 obesity due to excess calories with body mass index (BMI) of 36.0 to 36.9 in adult, unspecified whether serious comorbidity present  Long discussion on the benefits of healthy eating and regular exercise to help lose weight and help control diabetes, hypertension and hyperlipidemia.     Follow up in about 4 months (around 11/23/2020) for chronic medical issues.

## 2020-08-18 ENCOUNTER — TELEPHONE (OUTPATIENT)
Dept: FAMILY MEDICINE | Facility: CLINIC | Age: 60
End: 2020-08-18

## 2020-08-18 DIAGNOSIS — M72.2 PLANTAR FASCIITIS: Primary | ICD-10-CM

## 2020-08-18 NOTE — TELEPHONE ENCOUNTER
----- Message from Robert KRAMER Yolie sent at 8/18/2020 12:00 PM CDT -----  Regarding: Referral for Podiatry  Contact: Wife/Shari Mccarthy called in and stated patient () needs to get a referral for Dr. Matt Guzman, Podiatry at [facility].  Shari stated patients left foot (achilles heal) is painful & his planter faciatis is acting up.    Patients call back number is [phone]

## 2020-08-20 ENCOUNTER — PATIENT OUTREACH (OUTPATIENT)
Dept: ADMINISTRATIVE | Facility: OTHER | Age: 60
End: 2020-08-20

## 2020-08-20 NOTE — PROGRESS NOTES
Health Maintenance Due   Topic Date Due    Shingles Vaccine (1 of 2) 02/14/2008    Eye Exam  03/12/2020     Updates were requested from care everywhere.  Chart was reviewed for overdue Proactive [facility] Encounters ([facility]) topics (CRS, Breast Cancer Screening, Eye exam)  Health Maintenance has been updated.  LINKS immunization registry triggered.  Immunizations were reconciled.

## 2020-08-21 ENCOUNTER — HOSPITAL ENCOUNTER (OUTPATIENT)
Dept: RADIOLOGY | Facility: HOSPITAL | Age: 60
Discharge: HOME | End: 2020-08-21
Attending: PODIATRIST
Payer: COMMERCIAL

## 2020-08-21 ENCOUNTER — OFFICE VISIT (OUTPATIENT)
Dept: PODIATRY | Facility: CLINIC | Age: 60
End: 2020-08-21
Payer: COMMERCIAL

## 2020-08-21 VITALS — HEIGHT: 69 IN | WEIGHT: 244 LBS | BODY MASS INDEX: 36.14 KG/M2

## 2020-08-21 DIAGNOSIS — M76.62 TENDONITIS, ACHILLES, LEFT: ICD-10-CM

## 2020-08-21 PROCEDURE — 99203 PR OFFICE/OUTPT VISIT, NEW, LEVL III, 30-44 MIN: ICD-10-PCS | Mod: S$GLB,,, | Performed by: PODIATRIST

## 2020-08-21 PROCEDURE — 73650 XR CALCANEUS 2 VIEW LEFT: ICD-10-PCS | Mod: 26,LT,, | Performed by: RADIOLOGY

## 2020-08-21 PROCEDURE — 99999 PR PBB SHADOW E&M-EST. PATIENT-LVL IV: CPT | Mod: PBBFAC,,, | Performed by: PODIATRIST

## 2020-08-21 PROCEDURE — 99203 OFFICE O/P NEW LOW 30 MIN: CPT | Mod: S$GLB,,, | Performed by: PODIATRIST

## 2020-08-21 PROCEDURE — 73650 X-RAY EXAM OF HEEL: CPT | Mod: 26,LT,, | Performed by: RADIOLOGY

## 2020-08-21 PROCEDURE — 3008F BODY MASS INDEX DOCD: CPT | Mod: CPTII,S$GLB,, | Performed by: PODIATRIST

## 2020-08-21 PROCEDURE — 99999 PR PBB SHADOW E&M-EST. PATIENT-LVL IV: ICD-10-PCS | Mod: PBBFAC,,, | Performed by: PODIATRIST

## 2020-08-21 PROCEDURE — 73650 X-RAY EXAM OF HEEL: CPT | Mod: TC,PO,LT

## 2020-08-21 PROCEDURE — 3008F PR BODY MASS INDEX (BMI) DOCUMENTED: ICD-10-PCS | Mod: CPTII,S$GLB,, | Performed by: PODIATRIST

## 2020-08-21 RX ORDER — NAPROXEN 500 MG/1
500 TABLET ORAL 2 TIMES DAILY WITH MEALS
Qty: 60 TABLET | Refills: 0 | Status: SHIPPED | OUTPATIENT
Start: 2020-08-21 | End: 2020-09-21

## 2020-08-21 NOTE — LETTER
August 21, 2020      Kiah Parry DO  [address] [address]  [address]  [facility] [address]           [facility]  [address] [place] [address]  Phone: [phone]          Patient: Sergo Hollingsworth   MR Number: [id number]   YOB: 1960   Date of Visit: 8/21/2020       Dear Dr. Kiah Parry:    Thank you for referring Sergo Hollingsworth to me for evaluation. Attached you will find relevant portions of my assessment and plan of care.    If you have questions, please do not hesitate to call me. I look forward to following Sergo Hollingsworth along with you.    Sincerely,    Jason Guzman, TYLER    Enclosure  CC:  No Recipients    If you would like to receive this communication electronically, please contact [email or web address] or [phone] to request more information on [employer] Link access.    For providers and/or their staff who would like to refer a patient to [facility], please contact us through our one-stop-shop provider referral line, [facility], at [phone].    If you feel you have received this communication in error or would no longer like to receive these types of communications, please e-mail [email or web address]

## 2020-08-21 NOTE — PROGRESS NOTES
Subjective:      Patient ID: Sergo Hollingsworth is a 60 y.o. male.    Chief Complaint: Ankle Pain (L ankle// sharp pain x2mth ) and Diabetes Mellitus    Sergo is a 60 y.o. male who presents to the podiatry clinic  with complaint of  left foot pain posterior medial heel area. Onset of the symptoms was several weeks ago. Precipitating event: none known. Current symptoms include: ability to bear weight, but with some pain and worsening symptoms after a period of activity. Aggravating factors: any weight bearing. Symptoms have progressed to a point and plateaued. Patient has had no prior foot problems. Evaluation to date: none. Treatment to date: none. Patients rates pain 1/10 on pain scale.        Review of Systems   Constitution: Negative for chills and fever.   Cardiovascular: Negative for claudication and leg swelling.   Respiratory: Negative for shortness of breath.    Skin: Negative for itching, nail changes and rash.   Musculoskeletal: Negative for muscle cramps, muscle weakness and myalgias.        Left heel pain   Gastrointestinal: Negative for nausea and vomiting.   Neurological: Negative for focal weakness, loss of balance, numbness and paresthesias.           Objective:      Physical Exam  Constitutional:       General: He is not in acute distress.     Appearance: He is well-developed. He is not diaphoretic.   Cardiovascular:      Pulses:           Dorsalis pedis pulses are 2+ on the right side and 2+ on the left side.        Posterior tibial pulses are 2+ on the right side and 2+ on the left side.      Comments: < 3 sec capillary refill time to toes 1-5 bilateral. Toes and feet are warm to touch proximally with normal distal cooling b/l. There is some hair growth on the feet and toes b/l. There is no edema b/l. No spider veins or varicosities present b/l.     Musculoskeletal:      Comments: Equinus noted b/l ankles with < 10 deg DF noted. MMT 5/5 in DF/PF/Inv/Ev resistance with no reproduction of pain in any  direction. Passive range of motion of ankle and pedal joints is painless b/l.    Left posterior heel pain to palpation to the achilles attachment site on the calcaneus more on the medial aspect. No pain with PF against resistance minimal discomfort to DF end ROM with out resistance     Skin:     General: Skin is warm and dry.      Coloration: Skin is not pale.      Findings: No abrasion, bruising, burn, ecchymosis, erythema, laceration, lesion, petechiae or rash.      Nails: There is no clubbing.        Comments: Skin temperature, texture and turgor within normal limits.   Neurological:      Mental Status: He is alert and oriented to person, place, and time.      Sensory: No sensory deficit.      Motor: No tremor, atrophy or abnormal muscle tone.      Comments: Negative tinel sign bilateral.   Psychiatric:         Behavior: Behavior normal.               Assessment:       Encounter Diagnosis   Name Primary?    Tendonitis, Achilles, left          Plan:       Sergo was seen today for ankle pain and diabetes mellitus.    Diagnoses and all orders for this visit:    Tendonitis, Achilles, left  -     Ambulatory referral/consult to Podiatry  -     X-Ray Calcaneus 2 View Left; Future    Other orders  -     naproxen (NAPROSYN) 500 MG tablet; Take 1 tablet (500 mg total) by mouth 2 (two) times daily with meals.      I counseled the patient on his conditions, their implications and medical management.    The patient was advised that NSAID-type medications have two very important potential side effects: gastrointestinal irritation including hemorrhage and renal injuries. He was asked to take the medication with food and to stop if he experiences any GI upset.    Patient will stretch the tendo achilles complex three times daily as demonstrated in the office.  Literature was dispensed illustrating proper stretching technique.    Patient instructed on adequate icing techniques. Patient should ice the affected area at least once  per day x 10 minutes for 10 days . I advised the  patient that extra icing would also be beneficial to ensure adequate anti inflammatory effect       Heel lifts dispensed to be worn in the shoes    Return 6 weeks follow up SAI Guzman DPM

## 2020-10-01 ENCOUNTER — PATIENT OUTREACH (OUTPATIENT)
Dept: ADMINISTRATIVE | Facility: OTHER | Age: 60
End: 2020-10-01

## 2020-10-01 NOTE — PROGRESS NOTES
Health Maintenance Due   Topic Date Due    Shingles Vaccine (1 of 2) 02/14/2008    Eye Exam  03/12/2020    Influenza Vaccine (1) 08/01/2020     Updates were requested from care everywhere.  Chart was reviewed for overdue Proactive [facility] Encounters ([facility]) topics (CRS, Breast Cancer Screening, Eye exam)  Health Maintenance has been updated.  LINKS immunization registry triggered.  Immunizations were reconciled.

## 2020-10-02 ENCOUNTER — OFFICE VISIT (OUTPATIENT)
Dept: PODIATRY | Facility: CLINIC | Age: 60
End: 2020-10-02
Payer: COMMERCIAL

## 2020-10-02 VITALS — WEIGHT: 244 LBS | BODY MASS INDEX: 36.14 KG/M2 | HEIGHT: 69 IN

## 2020-10-02 DIAGNOSIS — M76.62 TENDONITIS, ACHILLES, LEFT: Primary | ICD-10-CM

## 2020-10-02 DIAGNOSIS — M89.8X7 RETROCALCANEAL EXOSTOSIS: ICD-10-CM

## 2020-10-02 PROCEDURE — 99999 PR PBB SHADOW E&M-EST. PATIENT-LVL III: ICD-10-PCS | Mod: PBBFAC,,, | Performed by: PODIATRIST

## 2020-10-02 PROCEDURE — 99212 PR OFFICE/OUTPT VISIT, EST, LEVL II, 10-19 MIN: ICD-10-PCS | Mod: S$GLB,,, | Performed by: PODIATRIST

## 2020-10-02 PROCEDURE — 3008F PR BODY MASS INDEX (BMI) DOCUMENTED: ICD-10-PCS | Mod: CPTII,S$GLB,, | Performed by: PODIATRIST

## 2020-10-02 PROCEDURE — 99999 PR PBB SHADOW E&M-EST. PATIENT-LVL III: CPT | Mod: PBBFAC,,, | Performed by: PODIATRIST

## 2020-10-02 PROCEDURE — 99212 OFFICE O/P EST SF 10 MIN: CPT | Mod: S$GLB,,, | Performed by: PODIATRIST

## 2020-10-02 PROCEDURE — 3008F BODY MASS INDEX DOCD: CPT | Mod: CPTII,S$GLB,, | Performed by: PODIATRIST

## 2020-10-08 ENCOUNTER — OFFICE VISIT (OUTPATIENT)
Dept: OPTOMETRY | Facility: CLINIC | Age: 60
End: 2020-10-08
Payer: COMMERCIAL

## 2020-10-08 DIAGNOSIS — E11.36 CATARACT ASSOCIATED WITH TYPE 2 DIABETES MELLITUS: ICD-10-CM

## 2020-10-08 DIAGNOSIS — H52.4 MYOPIA WITH ASTIGMATISM AND PRESBYOPIA, BILATERAL: ICD-10-CM

## 2020-10-08 DIAGNOSIS — Z46.0 CONTACT LENS/GLASSES FITTING: ICD-10-CM

## 2020-10-08 DIAGNOSIS — H52.203 MYOPIA WITH ASTIGMATISM AND PRESBYOPIA, BILATERAL: ICD-10-CM

## 2020-10-08 DIAGNOSIS — H43.393 VITREOUS FLOATERS, BILATERAL: ICD-10-CM

## 2020-10-08 DIAGNOSIS — Z13.5 SCREENING FOR GLAUCOMA: ICD-10-CM

## 2020-10-08 DIAGNOSIS — Z46.0 CONTACT LENS/GLASSES FITTING: Primary | ICD-10-CM

## 2020-10-08 DIAGNOSIS — E11.9 DIABETES MELLITUS TYPE 2 WITHOUT RETINOPATHY: Primary | ICD-10-CM

## 2020-10-08 DIAGNOSIS — H52.13 MYOPIA WITH ASTIGMATISM AND PRESBYOPIA, BILATERAL: ICD-10-CM

## 2020-10-08 PROCEDURE — 99999 PR PBB SHADOW E&M-EST. PATIENT-LVL III: ICD-10-PCS | Mod: PBBFAC,,, | Performed by: OPTOMETRIST

## 2020-10-08 PROCEDURE — 92015 DETERMINE REFRACTIVE STATE: CPT | Mod: S$GLB,,, | Performed by: OPTOMETRIST

## 2020-10-08 PROCEDURE — 99499 NO LOS: ICD-10-PCS | Mod: S$GLB,,, | Performed by: OPTOMETRIST

## 2020-10-08 PROCEDURE — 92014 PR EYE EXAM, EST PATIENT,COMPREHESV: ICD-10-PCS | Mod: S$GLB,,, | Performed by: OPTOMETRIST

## 2020-10-08 PROCEDURE — 92014 COMPRE OPH EXAM EST PT 1/>: CPT | Mod: S$GLB,,, | Performed by: OPTOMETRIST

## 2020-10-08 PROCEDURE — 92015 PR REFRACTION: ICD-10-PCS | Mod: S$GLB,,, | Performed by: OPTOMETRIST

## 2020-10-08 PROCEDURE — 99499 UNLISTED E&M SERVICE: CPT | Mod: S$GLB,,, | Performed by: OPTOMETRIST

## 2020-10-08 PROCEDURE — 99999 PR PBB SHADOW E&M-EST. PATIENT-LVL III: CPT | Mod: PBBFAC,,, | Performed by: OPTOMETRIST

## 2020-10-08 NOTE — PATIENT INSTRUCTIONS
"DRY EYES -- BURNING OR ABDULAZIZ SYMPTOMS:  Use Over The Counter artificial tears as needed for dry eye symptoms.   Some common brands include:  Systane, Optive, Refresh, and Thera-Tears.  These drops can be used as frequently as desired, but may be most helpful use during long periods of concentrated work.  For example, reading / working at the computer. Start with 3-4x per day.     Nighttime Ophthalmic gel or ointments are available: Refresh PM, Genteal, and Lacrilube.    Avoid drops that "get redness out" (Visine, Murine, Clear Eyes), as these may contain medication that could further irritate the eyes, especially with chronic use.    ALLERGY EYES -- ITCHING SYMPTOMS:  Over the counter medications include--Pataday, Zaditor, and Alaway.  Use as directed 1-2 drops daily for symptoms of itching / watering eyes.  These drops will not help for dry eye or exposure symptoms.    REDNESS RELIEF:  Lumify---is a good redness reliever that will not cause irritation if used chronically.         DIABETES AND THE EYE / DIABETIC RETINOPATHY    Diabetic retinopathy is a condition occurring in persons with diabetes, which causes progressive damage to the retina, the light sensitive lining at the back of the eye. It is a serious sight-threatening complication of diabetes.    Diabetic retinopathy is the result of damage to the tiny blood vessels that nourish the retina. They leak blood and other fluids that cause swelling of retinal tissue and clouding of vision. The condition usually affects both eyes. The longer a person has diabetes, the more likely they will develop diabetic retinopathy. If left untreated, diabetic retinopathy can cause blindness.  There are two basic types of diabetic retinopathy:    Background or nonproliferative diabetic retinopathy (NPDR)  Nonproliferative diabetic retinopathy (NPDR) is the earliest stage of diabetic retinopathy. With this condition, damaged blood vessels in the retina begin to leak extra fluid " and small amounts of blood into the eye. Sometimes, deposits of cholesterol or other fats from the blood may leak into the retina. Many people with diabetes have mild NPDR, which usually does not affect their vision. However, if their vision is affected, it is the result of macular edema and macular ischemia.    If vision is affected due to macular changes, a consult with a Retina Specialist may be advised.  This is an ophthalmologist that treats retina conditions, including diabetic retinopathy.     Proliferative diabetic retinopathy (PDR)  Proliferative diabetic retinopathy (PDR) mainly occurs when many of the blood vessels in the retina close, preventing enough blood flow. In an attempt to supply blood to the area where the original vessels closed, the retina responds by growing new blood vessels. This is called neovascularization. However, these new blood vessels are abnormal and do not supply the retina with proper blood flow. The new vessels are also often accompanied by scar tissue that may cause the retina to wrinkle or detach. PDR may cause more severe vision loss than NPDR because it can affect both central and peripheral vision.     A patient diagnosed with proliferative diabetic eye disease will be referred to a retinal specialist for consultation.    Often there are no visual symptoms in the early stages of diabetic retinopathy. That is why our eye care professionals recommend that everyone with diabetes have a comprehensive dilated eye examination once a year. Early detection and treatment can limit the potential for significant vision loss from diabetic retinopathy.        FLASHES / FLOATERS / POSTERIOR VITREOUS DETACHMENT    Call the clinic if you have any further changes in symptoms.  Including:  Increased numbers of floaters or flashing lights, dimness or darkness that moves through or stays constant in your vision, or any pain in the eye (s).    You may sometimes see small specks or clouds moving  in your field of vision.  They are called FLOATERS.  You can often see them when looking at a plain background, like a blank wall or blue rose.  Floaters are actually tiny clumps of gel or cells inside the VITREOUS, the clear jelly-like fluid that fills the inside of your eye.    While these objects look like they are in front of your eye, they are actually floating inside.  What you see are the shadows they cast on the RETINA, the nerve layer at the back of the eye that senses light and allows you to see.      POSTERIOR VITREOUS DETACHMENT    The appearance of new floaters may be alarming.  If you suddenly develop new floaters, you should contact your eye care professional  right away.    The retina can tear if the shrinking vitreous pulls away from the wall of the eye.  This sometimes causes a small amount of bleeding in the eye that may appear as new floaters.    A torn retina is always a serious problem, since it can lead to a retinal detachment.  You should see your eye care professional as soon as possible if:     even one new floater appears suddenly;   you see sudden flashes of light;   you notice other symptoms, like the loss of side vision, or a curtain closes down in your vision        POSTERIOR VITREOUS DETACHMENT is more common for people who:     are nearsighted;   have had cataract surgery;   have had YAG laser surgery of the eye;   have had inflammation inside the eye;   are over age 60.      While some floaters may remain visible, many of them will fade over time and become less noticeable.  Even if you've had some floaters for years, you should have your eyes checked as soon as possible if you notice new ones.    FLASHING LIGHTS    When the vitreous gel rubs or pulls on the retina, you may see what look like flashing lights or lightning streaks.  These flashes can appear off and on for several weeks or months.      Some people experience flashes of light that appear as jagged lines or  heat waves in both eyes, lasting 10-20 minutes.  These flashes are caused by a spasm of blood vessels in the brain, which is called a migraine.    If a headache follows these flashes, it's called a migraine headache.  If   no headache occurs, these flashes are called Ophthalmic or Ocular Migraine.

## 2020-10-08 NOTE — PROGRESS NOTES
[facility]     Routine diabetic eye exam-dle-3/12/19    Pt denies any changes since last visit. No blurred vision. No eye pain. No   flashes or floaters. BSL controlled.    Hemoglobin A1C       Date                     Value               Ref Range             Status                07/20/2020               7.3 (H)             4.0 - 5.6 %           Final              Comment:    ADA Screening Guidelines:  5.7-6.4%  Consistent with   prediabetes  >or=6.5%  Consistent with diabetes  High levels of fetal   hemoglobin interfere with the HbA1C  assay. Heterozygous hemoglobin   variants (HbS, HgC, etc)do  not significantly interfere with this assay.     However, presence of multiple variants may affect accuracy.         03/19/2020               8.8 (H)             4.0 - 5.6 %           Final              Comment:    ADA Screening Guidelines:  5.7-6.4%  Consistent with   prediabetes  >or=6.5%  Consistent with diabetes  High levels of fetal   hemoglobin interfere with the HbA1C  assay. Heterozygous hemoglobin   variants (HbS, HgC, etc)do  not significantly interfere with this assay.     However, presence of multiple variants may affect accuracy.         11/07/2019               7.8 (H)             4.0 - 5.6 %           Final              Comment:    ADA Screening Guidelines:  5.7-6.4%  Consistent with   prediabetes  >or=6.5%  Consistent with diabetes  High levels of fetal   hemoglobin interfere with the HbA1C  assay. Heterozygous hemoglobin   variants (HbS, HgC, etc)do  not significantly interfere with this assay.     However, presence of multiple variants may affect accuracy.    ----------      Last edited by Marcella Knapp on 10/8/2020  1:19 PM. (History)        ROS     Positive for: Eyes    Negative for: Constitutional, Gastrointestinal, Neurological, Skin,   Genitourinary, Musculoskeletal, HENT, Endocrine, Cardiovascular,   Respiratory, Psychiatric, Allergic/Imm, Heme/Lymph    Last edited by LU Velázquez OD on  10/8/2020  1:47 PM. (History)        Assessment /Plan     For exam results, see Encounter Report.    Diabetes mellitus type 2 without retinopathy    Vitreous floaters, bilateral    Cataract associated with type 2 diabetes mellitus    Screening for glaucoma    Myopia with astigmatism and presbyopia, bilateral    Contact lens/glasses fitting      1. No nhi/ retinopathy, no csme, gave Diabetic Retinopathy info, control glucose and BP  Advise annual DFE  2. RD precautions given and reviewed. Patient knows to call/ message if any further changes in symptoms occur.  3. Early changes, not vis sig for consult   4. Low suspect w/ moderate c/d and mid teens IOP  5. Updated specs for distance only   6. Updated clrx, gave new trials -1.25 vs -1.00    DAILY WEAR CONTACT LENSES  Continue with Daily Wear of contact lenses, up to all waking hours.  Continue with approved contact lens disinfection / rewetting drops as discussed.  Dispose of lenses monthly.  Stop wearing your lenses and call our office if redness, blurred vision, or pain persists more than 12 hours.  We recommend an annual eye exam for contact lens patients.      Discussed and educated patient on current findings /plan.  RTC 1 year, prn if any changes / issues

## 2020-11-18 ENCOUNTER — LAB VISIT (OUTPATIENT)
Dept: LAB | Facility: HOSPITAL | Age: 60
End: 2020-11-18
Attending: INTERNAL MEDICINE
Payer: COMMERCIAL

## 2020-11-18 DIAGNOSIS — Z79.4 TYPE 2 DIABETES MELLITUS WITH HYPERGLYCEMIA, WITH LONG-TERM CURRENT USE OF INSULIN: ICD-10-CM

## 2020-11-18 DIAGNOSIS — E11.65 TYPE 2 DIABETES MELLITUS WITH HYPERGLYCEMIA, WITH LONG-TERM CURRENT USE OF INSULIN: ICD-10-CM

## 2020-11-18 LAB
ANION GAP SERPL CALC-SCNC: 11 MMOL/L (ref 8–16)
BUN SERPL-MCNC: 24 MG/DL (ref 8–23)
CALCIUM SERPL-MCNC: 8.9 MG/DL (ref 8.7–10.5)
CHLORIDE SERPL-SCNC: 100 MMOL/L (ref 95–110)
CO2 SERPL-SCNC: 23 MMOL/L (ref 23–29)
CREAT SERPL-MCNC: 1.5 MG/DL (ref 0.5–1.4)
EST. GFR  (AFRICAN AMERICAN): 56.9 ML/MIN/1.73 M^2
EST. GFR  (NON AFRICAN AMERICAN): 49.2 ML/MIN/1.73 M^2
ESTIMATED AVG GLUCOSE: 177 MG/DL (ref 68–131)
GLUCOSE SERPL-MCNC: 170 MG/DL (ref 70–110)
HBA1C MFR BLD HPLC: 7.8 % (ref 4–5.6)
POTASSIUM SERPL-SCNC: 4.2 MMOL/L (ref 3.5–5.1)
SODIUM SERPL-SCNC: 134 MMOL/L (ref 136–145)

## 2020-11-18 PROCEDURE — 83036 HEMOGLOBIN GLYCOSYLATED A1C: CPT

## 2020-11-18 PROCEDURE — 36415 COLL VENOUS BLD VENIPUNCTURE: CPT | Mod: PO

## 2020-11-18 PROCEDURE — 80048 BASIC METABOLIC PNL TOTAL CA: CPT

## 2020-11-19 ENCOUNTER — PATIENT MESSAGE (OUTPATIENT)
Dept: FAMILY MEDICINE | Facility: CLINIC | Age: 60
End: 2020-11-19

## 2020-12-03 ENCOUNTER — OFFICE VISIT (OUTPATIENT)
Dept: FAMILY MEDICINE | Facility: CLINIC | Age: 60
End: 2020-12-03
Payer: COMMERCIAL

## 2020-12-03 VITALS
WEIGHT: 248.38 LBS | TEMPERATURE: 98 F | DIASTOLIC BLOOD PRESSURE: 78 MMHG | SYSTOLIC BLOOD PRESSURE: 136 MMHG | HEART RATE: 80 BPM | RESPIRATION RATE: 18 BRPM | OXYGEN SATURATION: 98 % | BODY MASS INDEX: 36.67 KG/M2

## 2020-12-03 DIAGNOSIS — I10 ESSENTIAL HYPERTENSION: Primary | ICD-10-CM

## 2020-12-03 DIAGNOSIS — E11.65 TYPE 2 DIABETES MELLITUS WITH HYPERGLYCEMIA, WITH LONG-TERM CURRENT USE OF INSULIN: ICD-10-CM

## 2020-12-03 DIAGNOSIS — E78.5 HYPERLIPIDEMIA, UNSPECIFIED HYPERLIPIDEMIA TYPE: ICD-10-CM

## 2020-12-03 DIAGNOSIS — N18.31 STAGE 3A CHRONIC KIDNEY DISEASE: ICD-10-CM

## 2020-12-03 DIAGNOSIS — E66.01 CLASS 2 SEVERE OBESITY WITH SERIOUS COMORBIDITY AND BODY MASS INDEX (BMI) OF 36.0 TO 36.9 IN ADULT, UNSPECIFIED OBESITY TYPE: ICD-10-CM

## 2020-12-03 DIAGNOSIS — S49.92XA INJURY OF LEFT SHOULDER, INITIAL ENCOUNTER: ICD-10-CM

## 2020-12-03 DIAGNOSIS — Z79.4 TYPE 2 DIABETES MELLITUS WITH HYPERGLYCEMIA, WITH LONG-TERM CURRENT USE OF INSULIN: ICD-10-CM

## 2020-12-03 DIAGNOSIS — D64.9 ANEMIA, UNSPECIFIED TYPE: ICD-10-CM

## 2020-12-03 PROCEDURE — 3072F PR LOW RISK FOR RETINOPATHY: ICD-10-PCS | Mod: S$GLB,,, | Performed by: INTERNAL MEDICINE

## 2020-12-03 PROCEDURE — 3008F BODY MASS INDEX DOCD: CPT | Mod: CPTII,S$GLB,, | Performed by: INTERNAL MEDICINE

## 2020-12-03 PROCEDURE — 1125F AMNT PAIN NOTED PAIN PRSNT: CPT | Mod: S$GLB,,, | Performed by: INTERNAL MEDICINE

## 2020-12-03 PROCEDURE — 3075F SYST BP GE 130 - 139MM HG: CPT | Mod: CPTII,S$GLB,, | Performed by: INTERNAL MEDICINE

## 2020-12-03 PROCEDURE — 3051F PR MOST RECENT HEMOGLOBIN A1C LEVEL 7.0 - < 8.0%: ICD-10-PCS | Mod: CPTII,S$GLB,, | Performed by: INTERNAL MEDICINE

## 2020-12-03 PROCEDURE — 3008F PR BODY MASS INDEX (BMI) DOCUMENTED: ICD-10-PCS | Mod: CPTII,S$GLB,, | Performed by: INTERNAL MEDICINE

## 2020-12-03 PROCEDURE — 1125F PR PAIN SEVERITY QUANTIFIED, PAIN PRESENT: ICD-10-PCS | Mod: S$GLB,,, | Performed by: INTERNAL MEDICINE

## 2020-12-03 PROCEDURE — 3072F LOW RISK FOR RETINOPATHY: CPT | Mod: S$GLB,,, | Performed by: INTERNAL MEDICINE

## 2020-12-03 PROCEDURE — 3075F PR MOST RECENT SYSTOLIC BLOOD PRESS GE 130-139MM HG: ICD-10-PCS | Mod: CPTII,S$GLB,, | Performed by: INTERNAL MEDICINE

## 2020-12-03 PROCEDURE — 3051F HG A1C>EQUAL 7.0%<8.0%: CPT | Mod: CPTII,S$GLB,, | Performed by: INTERNAL MEDICINE

## 2020-12-03 PROCEDURE — 99214 OFFICE O/P EST MOD 30 MIN: CPT | Mod: S$GLB,,, | Performed by: INTERNAL MEDICINE

## 2020-12-03 PROCEDURE — 3078F DIAST BP <80 MM HG: CPT | Mod: CPTII,S$GLB,, | Performed by: INTERNAL MEDICINE

## 2020-12-03 PROCEDURE — 99214 PR OFFICE/OUTPT VISIT, EST, LEVL IV, 30-39 MIN: ICD-10-PCS | Mod: S$GLB,,, | Performed by: INTERNAL MEDICINE

## 2020-12-03 PROCEDURE — 3078F PR MOST RECENT DIASTOLIC BLOOD PRESSURE < 80 MM HG: ICD-10-PCS | Mod: CPTII,S$GLB,, | Performed by: INTERNAL MEDICINE

## 2020-12-03 RX ORDER — GABAPENTIN 300 MG/1
300 CAPSULE ORAL NIGHTLY
Qty: 30 CAPSULE | Refills: 11
Start: 2020-12-03 | End: 2024-01-08 | Stop reason: SDUPTHER

## 2020-12-03 NOTE — PROGRESS NOTES
"Subjective:       Patient ID: Sergo Hollingsworth is a 60 y.o. male.    Medication List with Changes/Refills   New Medications    GABAPENTIN (NEURONTIN) 300 MG CAPSULE    Take 1 capsule (300 mg total) by mouth every evening.   Current Medications    AMLODIPINE-BENAZEPRIL 5-20 MG (LOTREL) 5-20 MG PER CAPSULE    TAKE 1 CAPSULE DAILY    ASPIRIN (ECOTRIN) 81 MG EC TABLET    Take 1 tablet (81 mg total) by mouth once daily.    ATORVASTATIN (LIPITOR) 10 MG TABLET    TAKE 1 TABLET DAILY    BLOOD SUGAR DIAGNOSTIC STRP    One Touch Ultra test strips. Check your glucose qday    BLOOD-GLUCOSE METER (BLOOD GLUCOSE MONITORING) KIT    Use as instructed.  Free Style Whit    INSULIN (LANTUS SOLOSTAR U-100 INSULIN) GLARGINE 100 UNITS/ML (3ML) SUBQ PEN    Inject 20 Units into the skin every evening.    METFORMIN (GLUCOPHAGE) 1000 MG TABLET    TAKE 1 TABLET TWICE A DAY WITH MEALS    PEN NEEDLE, DIABETIC (PEN NEEDLE) 30 GAUGE X 5/16" NDLE    Use once a day with insulin    SITAGLIPTIN (JANUVIA) 100 MG TAB    Take 1 tablet (100 mg total) by mouth once daily.    TRIAMTERENE-HYDROCHLOROTHIAZIDE 37.5-25 MG (DYAZIDE) 37.5-25 MG PER CAPSULE    TAKE 1 CAPSULE EVERY MORNING   Discontinued Medications    NAPROXEN (NAPROSYN) 500 MG TABLET    TAKE 1 TABLET BY MOUTH TWICE A DAY WITH MEALS       Chief Complaint: Follow-up  He is here today to f/u on chronic medical issues.       He has hypertension dx since 2006. He has no known CAD. He is taking combination benazepril-amlodipine 20-5 mg qday and triamterene-HCTZ 37.5-25 mg qday. He does not check his BP at home. He denies chest pain or shortness of breath.       He has diabetes and is taking metformin 1000 mg qday, januvia 100 mg qday and lantus 20 units qhs. He was unable to tolerate actos due to lower leg swelling. His most recent HbA1c was 7.8 on 11/2020. He does not regularly check his glucose at home. He denies any symptoms of hypoglycemia.  He is UTD on his foot exam and his eye exam. His " microalbumin is negative on 3/2020 and he is taking an ACEI.  He denies any neuropathy or vision issues.  He was off his medication for one week during the move (just prior to checking HbA1c).       He has hyperlipidemia and is on atorvastatin 10 mg qday with last lipids on 7/2020 showed 107/93/42/46.  He does take an aspirin daily.      He has mild anemia with normal indices on labs on 7/2019 with H+H of 12.4/38.5. He has had mild anemia about 2 years ago that resolved. He denies any blood in his stool. His last colonoscopy was on 6/2017 was normal.  Repeat H+H on 7/2020 showed 12.6/38.       He continues to have mild kidney impairment with creatinine to 1.5 with GFR of 49 on 11/2020. His baseline creatinine is 1.1.      He complains of one week of left shoulder pain that started after taking down an overhead chandelier.  He has no pain at rest but mild with movement.  No weakness in his arm.  He is using tylenol or aleve for pain.      He complains of pain in lower legs at night with difficulty sleeping. He uses gabapentin with good relief and would like to start taking regularly.      He lives with his wife and feels safe at home. He recently moved to [place] but wants to continue his care here.  He stays very active. He has been eating healthy and has been active with the move. He does not exercise.  He continues to chew tobacco and does not want to quit.       Colonoscopy----6/2017 repeat in 10 years  Tdap---4/2017  Pneumovax--4/2017  Influenza vaccine---refuses  Shingles vaccine---none        Review of Systems   Constitutional: Negative for appetite change, fatigue, fever and unexpected weight change.   HENT: Negative for congestion, ear pain, hearing loss, sore throat and trouble swallowing.    Eyes: Negative for pain and visual disturbance.   Respiratory: Negative for cough, chest tightness, shortness of breath and wheezing.    Cardiovascular: Negative for chest pain, palpitations and leg swelling.    Gastrointestinal: Positive for diarrhea. Negative for abdominal pain, blood in stool, constipation, nausea and vomiting.   Endocrine: Negative for polyuria.   Genitourinary: Negative for dysuria and hematuria.   Musculoskeletal: Positive for arthralgias (left shoulder, and left ankle ). Negative for back pain and myalgias.   Skin: Negative for rash.   Neurological: Negative for dizziness, weakness, numbness and headaches.   Hematological: Does not bruise/bleed easily.   Psychiatric/Behavioral: Negative for dysphoric mood, sleep disturbance and suicidal ideas. The patient is not nervous/anxious.        Objective:      Vitals:    12/03/20 0938   BP: 136/78   Pulse: 80   Resp: 18   Temp: 97.7 °F (36.5 °C)   SpO2: 98%   Weight: 112.6 kg (248 lb 5.6 oz)     Body mass index is 36.67 kg/m².  Physical Exam    General appearance: No acute distress, cooperative  Eyes: PERRL, EOMI, conjunctiva clear  HEENT: ears normal, nose without rhinorrhea, normal turbinates,mouth no sores or lesions, throat no erythema or pus  Neck: FROM, soft, supple, no thyromegaly, no bruits  Lymph: no anterior or posterior cervical adenopathy  Heart::  Regular rate and rhythm, no murmur  Lung: Clear to ascultation bilaterally, no wheezing, no rales, no rhonchi, no distress  Abdomen: Soft, nontender, no distention, no hepatosplenomegaly, bowel sounds normal, no guarding, no rebound, no peritoneal signs  Skin: no rashes, no lesions  Extremities: no edema, no cyanosis  Neuro: no focal abnormalities, strength 5/5 b/l UE and LE, 2+ DTRs b/l UE and LE, normal gait  Peripheral pulses: 2+ pedal pulses bilaterally, good perfusion and color  Musculoskeletal: FROM, good strenth, no tenderness  Joint: normal appearance, no swelling, no warmth, no deformity in all joints, FROM of left shoulder, negative drop arm test or signs of impingement    Assessment:       1. Essential hypertension    2. Hyperlipidemia, unspecified hyperlipidemia type    3. Type 2 diabetes  mellitus with hyperglycemia, with long-term current use of insulin    4. Stage 3a chronic kidney disease    5. Anemia, unspecified type    6. Injury of left shoulder, initial encounter    7. Class 2 severe obesity with serious comorbidity and body mass index (BMI) of 36.0 to 36.9 in adult, unspecified obesity type        Plan:       Essential hypertension  Well controlled and continue current regimen. He is due for labs prior to his next appt.   -     CBC Auto Differential; Future; Expected date: 06/01/2021  -     Hemoglobin A1C; Future; Expected date: 06/01/2021  -     Lipid Panel; Future; Expected date: 06/01/2021  -     TSH; Future; Expected date: 06/01/2021    Hyperlipidemia, unspecified hyperlipidemia type  Good control on atorvastatin. He continues on aspirin daily.     Type 2 diabetes mellitus with hyperglycemia, with long-term current use of insulin  Mild increase in HbA1c but he was without medication for one week.  Continue current regimen and recheck labs in 6 months. Recommended that he check glucose qam and send me readings. He is UTD on his foot and eye exam.   -     Comprehensive Metabolic Panel; Future; Expected date: 06/01/2021  -     Microalbumin/Creatinine Ratio, Urine; Future; Expected date: 06/01/2021  -     gabapentin (NEURONTIN) 300 MG capsule; Take 1 capsule (300 mg total) by mouth every evening.  Dispense: 30 capsule; Refill: 11    Stage 3a chronic kidney disease  Continue to follow. Discussed stopping all NSAIDs.  Okay to use tylenol for pain.     Anemia, unspecified type  Stable and continue to monitor.     Injury of left shoulder, initial encounter  Reassurance and supportive care. Suspect mild soft tissues injury. Advised to use rest and Ice to help with pain. If worsens then will refer to orthopedics for evaluation.     Class 2 severe obesity with serious comorbidity and body mass index (BMI) of 36.0 to 36.9 in adult, unspecified obesity type  Long discussion on the benefits of healthy  eating and regular exercise to help lose weight and help control diabetes, hyperlipidemia and hypertension.     Follow up in about 6 months (around 6/3/2021) for chronic medical issues.

## 2020-12-07 ENCOUNTER — TELEPHONE (OUTPATIENT)
Dept: OPTOMETRY | Facility: CLINIC | Age: 60
End: 2020-12-07

## 2020-12-07 NOTE — TELEPHONE ENCOUNTER
----- Message from Heike Mendes sent at 12/7/2020  9:25 AM CST -----  Contact: self  Type: Needs Medical Advice  Who Called:  patient     Best Call Back Number: [phone] (home)   Additional Information: feels as though he has light/stars dropping in the corner of his left eye, when looking left or right, when looking straight they disappear, since Friday. Is not happening constantly. Please contact to advise.

## 2020-12-28 ENCOUNTER — TELEPHONE (OUTPATIENT)
Dept: OPTOMETRY | Facility: CLINIC | Age: 60
End: 2020-12-28

## 2020-12-28 NOTE — TELEPHONE ENCOUNTER
----- Message from Mabel Parada sent at 12/28/2020  4:00 PM CST -----  Regarding: advice  Contact: patient  Type: Needs Medical Advice    Who Called:  pt  Symptoms (please be specific):  na/  How long has patient had these symptoms:  n/a  Pharmacy name and phone #:  na/  Best Call Back Number: [phone]  Additional Information: asking for a copy of contact prescription. Just would like for it to be emailed         Report given to Amrita Oscar

## 2021-02-10 ENCOUNTER — PATIENT MESSAGE (OUTPATIENT)
Dept: PODIATRY | Facility: CLINIC | Age: 61
End: 2021-02-10

## 2021-02-10 ENCOUNTER — TELEPHONE (OUTPATIENT)
Dept: PODIATRY | Facility: CLINIC | Age: 61
End: 2021-02-10

## 2021-04-05 ENCOUNTER — TELEPHONE (OUTPATIENT)
Dept: FAMILY MEDICINE | Facility: CLINIC | Age: 61
End: 2021-04-05

## 2021-05-12 ENCOUNTER — PATIENT MESSAGE (OUTPATIENT)
Dept: RESEARCH | Facility: HOSPITAL | Age: 61
End: 2021-05-12

## 2021-06-18 ENCOUNTER — HISTORICAL (OUTPATIENT)
Dept: ADMINISTRATIVE | Facility: HOSPITAL | Age: 61
End: 2021-06-18

## 2021-06-18 LAB
ABS NEUT (OLG): 3.97 X10(3)/MCL (ref 2.1–9.2)
ALBUMIN SERPL-MCNC: 3.8 GM/DL (ref 3.4–4.8)
ALBUMIN/GLOB SERPL: 1.1 RATIO (ref 1.1–2)
ALP SERPL-CCNC: 54 UNIT/L (ref 40–150)
ALT SERPL-CCNC: 26 UNIT/L (ref 0–55)
AST SERPL-CCNC: 23 UNIT/L (ref 5–34)
BASOPHILS # BLD AUTO: 0.1 X10(3)/MCL (ref 0–0.2)
BASOPHILS NFR BLD AUTO: 1 %
BILIRUB SERPL-MCNC: 1 MG/DL
BILIRUBIN DIRECT+TOT PNL SERPL-MCNC: 0.4 MG/DL (ref 0–0.5)
BILIRUBIN DIRECT+TOT PNL SERPL-MCNC: 0.6 MG/DL (ref 0–0.8)
BUN SERPL-MCNC: 22.9 MG/DL (ref 8.4–25.7)
CALCIUM SERPL-MCNC: 9.6 MG/DL (ref 8.8–10)
CHLORIDE SERPL-SCNC: 103 MMOL/L (ref 98–107)
CHOLEST SERPL-MCNC: 122 MG/DL
CHOLEST/HDLC SERPL: 3 {RATIO} (ref 0–5)
CO2 SERPL-SCNC: 30 MMOL/L (ref 23–31)
CREAT SERPL-MCNC: 1.45 MG/DL (ref 0.73–1.18)
CREAT UR-MCNC: 91.5 MG/DL (ref 58–161)
EOSINOPHIL # BLD AUTO: 0.2 X10(3)/MCL (ref 0–0.9)
EOSINOPHIL NFR BLD AUTO: 3 %
ERYTHROCYTE [DISTWIDTH] IN BLOOD BY AUTOMATED COUNT: 12.6 % (ref 11.5–17)
EST. AVERAGE GLUCOSE BLD GHB EST-MCNC: 185.8 MG/DL
GLOBULIN SER-MCNC: 3.4 GM/DL (ref 2.4–3.5)
GLUCOSE SERPL-MCNC: 160 MG/DL (ref 82–115)
HBA1C MFR BLD: 8.1 %
HCT VFR BLD AUTO: 38.6 % (ref 42–52)
HDLC SERPL-MCNC: 39 MG/DL (ref 35–60)
HGB BLD-MCNC: 13.1 GM/DL (ref 14–18)
HIV 1+2 AB+HIV1 P24 AG SERPL QL IA: NONREACTIVE
LDLC SERPL CALC-MCNC: 60 MG/DL (ref 50–140)
LYMPHOCYTES # BLD AUTO: 1.3 X10(3)/MCL (ref 0.6–4.6)
LYMPHOCYTES NFR BLD AUTO: 21 %
MCH RBC QN AUTO: 29.9 PG (ref 27–31)
MCHC RBC AUTO-ENTMCNC: 33.9 GM/DL (ref 33–36)
MCV RBC AUTO: 88.1 FL (ref 80–94)
MICROALBUMIN UR-MCNC: 7.2 UG/ML
MICROALBUMIN/CREAT RATIO PNL UR: 7.9 MG/GM CR (ref 0–30)
MONOCYTES # BLD AUTO: 0.8 X10(3)/MCL (ref 0.1–1.3)
MONOCYTES NFR BLD AUTO: 12 %
NEUTROPHILS # BLD AUTO: 3.97 X10(3)/MCL (ref 2.1–9.2)
NEUTROPHILS NFR BLD AUTO: 63 %
PLATELET # BLD AUTO: 255 X10(3)/MCL (ref 130–400)
PMV BLD AUTO: 10.1 FL (ref 9.4–12.4)
POTASSIUM SERPL-SCNC: 4.3 MMOL/L (ref 3.5–5.1)
PROT SERPL-MCNC: 7.2 GM/DL (ref 5.8–7.6)
RBC # BLD AUTO: 4.38 X10(6)/MCL (ref 4.7–6.1)
SODIUM SERPL-SCNC: 139 MMOL/L (ref 136–145)
TRIGL SERPL-MCNC: 117 MG/DL (ref 34–140)
VLDLC SERPL CALC-MCNC: 23 MG/DL
WBC # SPEC AUTO: 6.3 X10(3)/MCL (ref 4.5–11.5)

## 2021-09-16 ENCOUNTER — HISTORICAL (OUTPATIENT)
Dept: ADMINISTRATIVE | Facility: HOSPITAL | Age: 61
End: 2021-09-16

## 2021-09-16 LAB
ALBUMIN SERPL-MCNC: 4 GM/DL (ref 3.4–4.8)
ALBUMIN/GLOB SERPL: 1.3 RATIO (ref 1.1–2)
ALP SERPL-CCNC: 54 UNIT/L (ref 40–150)
ALT SERPL-CCNC: 23 UNIT/L (ref 0–55)
AST SERPL-CCNC: 24 UNIT/L (ref 5–34)
BILIRUB SERPL-MCNC: 0.7 MG/DL
BILIRUBIN DIRECT+TOT PNL SERPL-MCNC: 0.3 MG/DL (ref 0–0.5)
BILIRUBIN DIRECT+TOT PNL SERPL-MCNC: 0.4 MG/DL (ref 0–0.8)
BUN SERPL-MCNC: 17.2 MG/DL (ref 8.4–25.7)
CALCIUM SERPL-MCNC: 9.6 MG/DL (ref 8.8–10)
CHLORIDE SERPL-SCNC: 103 MMOL/L (ref 98–107)
CO2 SERPL-SCNC: 26 MMOL/L (ref 23–31)
CREAT SERPL-MCNC: 1.12 MG/DL (ref 0.73–1.18)
EST. AVERAGE GLUCOSE BLD GHB EST-MCNC: 159.9 MG/DL
GLOBULIN SER-MCNC: 3.1 GM/DL (ref 2.4–3.5)
GLUCOSE SERPL-MCNC: 171 MG/DL (ref 82–115)
GROUP & RH: NORMAL
HBA1C MFR BLD: 7.2 %
POTASSIUM SERPL-SCNC: 3.8 MMOL/L (ref 3.5–5.1)
PROT SERPL-MCNC: 7.1 GM/DL (ref 5.8–7.6)
SODIUM SERPL-SCNC: 142 MMOL/L (ref 136–145)

## 2021-12-21 ENCOUNTER — HISTORICAL (OUTPATIENT)
Dept: ADMINISTRATIVE | Facility: HOSPITAL | Age: 61
End: 2021-12-21

## 2021-12-21 LAB
ALBUMIN SERPL-MCNC: 4.2 GM/DL (ref 3.4–4.8)
ALBUMIN/GLOB SERPL: 1.3 RATIO (ref 1.1–2)
ALP SERPL-CCNC: 53 UNIT/L (ref 40–150)
ALT SERPL-CCNC: 34 UNIT/L (ref 0–55)
AST SERPL-CCNC: 32 UNIT/L (ref 5–34)
BILIRUB SERPL-MCNC: 0.7 MG/DL
BILIRUBIN DIRECT+TOT PNL SERPL-MCNC: 0.3 MG/DL (ref 0–0.8)
BILIRUBIN DIRECT+TOT PNL SERPL-MCNC: 0.4 MG/DL (ref 0–0.5)
BUN SERPL-MCNC: 13.7 MG/DL (ref 8.4–25.7)
CALCIUM SERPL-MCNC: 10.1 MG/DL (ref 8.7–10.5)
CHLORIDE SERPL-SCNC: 102 MMOL/L (ref 98–107)
CO2 SERPL-SCNC: 29 MMOL/L (ref 23–31)
CREAT SERPL-MCNC: 1.11 MG/DL (ref 0.73–1.18)
EST. AVERAGE GLUCOSE BLD GHB EST-MCNC: 159.9 MG/DL
GLOBULIN SER-MCNC: 3.3 GM/DL (ref 2.4–3.5)
GLUCOSE SERPL-MCNC: 157 MG/DL (ref 82–115)
HBA1C MFR BLD: 7.2 %
POTASSIUM SERPL-SCNC: 4.4 MMOL/L (ref 3.5–5.1)
PROT SERPL-MCNC: 7.5 GM/DL (ref 5.8–7.6)
SODIUM SERPL-SCNC: 139 MMOL/L (ref 136–145)

## 2022-03-15 ENCOUNTER — HISTORICAL (OUTPATIENT)
Dept: ADMINISTRATIVE | Facility: HOSPITAL | Age: 62
End: 2022-03-15

## 2022-03-15 LAB
ALBUMIN SERPL-MCNC: 4.1 G/DL (ref 3.4–4.8)
ALBUMIN/GLOB SERPL: 1.2 {RATIO} (ref 1.1–2)
ALP SERPL-CCNC: 48 U/L (ref 40–150)
ALT SERPL-CCNC: 27 U/L (ref 0–55)
AST SERPL-CCNC: 25 U/L (ref 5–34)
BILIRUB SERPL-MCNC: 1 MG/DL
BILIRUBIN DIRECT+TOT PNL SERPL-MCNC: 0.5 (ref 0–0.5)
BILIRUBIN DIRECT+TOT PNL SERPL-MCNC: 0.5 (ref 0–0.8)
BUN SERPL-MCNC: 17 MG/DL (ref 8.4–25.7)
CALCIUM SERPL-MCNC: 9.8 MG/DL (ref 8.7–10.5)
CHLORIDE SERPL-SCNC: 101 MMOL/L (ref 98–107)
CO2 SERPL-SCNC: 24 MMOL/L (ref 23–31)
CREAT SERPL-MCNC: 1.26 MG/DL (ref 0.73–1.18)
EST. AVERAGE GLUCOSE BLD GHB EST-MCNC: 168.6 MG/DL
GLOBULIN SER-MCNC: 3.3 G/DL (ref 2.4–3.5)
GLUCOSE SERPL-MCNC: 114 MG/DL (ref 82–115)
HBA1C MFR BLD: 7.5 %
HEMOLYSIS INTERF INDEX SERPL-ACNC: 3
ICTERIC INTERF INDEX SERPL-ACNC: 1
LIPEMIC INTERF INDEX SERPL-ACNC: <0
POTASSIUM SERPL-SCNC: 3.8 MMOL/L (ref 3.5–5.1)
PROT SERPL-MCNC: 7.4 G/DL (ref 5.8–7.6)
SODIUM SERPL-SCNC: 139 MMOL/L (ref 136–145)

## 2022-04-11 ENCOUNTER — HISTORICAL (OUTPATIENT)
Dept: ADMINISTRATIVE | Facility: HOSPITAL | Age: 62
End: 2022-04-11
Payer: COMMERCIAL

## 2022-04-24 VITALS
OXYGEN SATURATION: 99 % | DIASTOLIC BLOOD PRESSURE: 80 MMHG | SYSTOLIC BLOOD PRESSURE: 130 MMHG | HEIGHT: 68 IN | WEIGHT: 245.38 LBS | BODY MASS INDEX: 37.19 KG/M2

## 2022-06-24 ENCOUNTER — LAB VISIT (OUTPATIENT)
Dept: LAB | Facility: HOSPITAL | Age: 62
End: 2022-06-24
Attending: INTERNAL MEDICINE
Payer: COMMERCIAL

## 2022-06-24 DIAGNOSIS — Z12.5 SPECIAL SCREENING FOR MALIGNANT NEOPLASM OF PROSTATE: ICD-10-CM

## 2022-06-24 DIAGNOSIS — D63.8 MEGALOBLASTIC ANEMIA DUE TO FISH TAPEWORM: Primary | ICD-10-CM

## 2022-06-24 DIAGNOSIS — I10 ESSENTIAL HYPERTENSION, MALIGNANT: ICD-10-CM

## 2022-06-24 DIAGNOSIS — E78.1 PURE HYPERGLYCERIDEMIA: ICD-10-CM

## 2022-06-24 DIAGNOSIS — B70.0 MEGALOBLASTIC ANEMIA DUE TO FISH TAPEWORM: Primary | ICD-10-CM

## 2022-06-24 LAB
ALBUMIN SERPL-MCNC: 4.1 GM/DL (ref 3.4–4.8)
ALBUMIN/GLOB SERPL: 1.3 RATIO (ref 1.1–2)
ALP SERPL-CCNC: 52 UNIT/L (ref 40–150)
ALT SERPL-CCNC: 32 UNIT/L (ref 0–55)
APPEARANCE UR: CLEAR
AST SERPL-CCNC: 28 UNIT/L (ref 5–34)
BACTERIA #/AREA URNS AUTO: NORMAL /HPF
BASOPHILS # BLD AUTO: 0.07 X10(3)/MCL (ref 0–0.2)
BASOPHILS NFR BLD AUTO: 1 %
BILIRUB UR QL STRIP.AUTO: NEGATIVE MG/DL
BILIRUBIN DIRECT+TOT PNL SERPL-MCNC: 0.8 MG/DL
BUN SERPL-MCNC: 16 MG/DL (ref 8.4–25.7)
CALCIUM SERPL-MCNC: 9.7 MG/DL (ref 8.8–10)
CHLORIDE SERPL-SCNC: 102 MMOL/L (ref 98–107)
CHOLEST SERPL-MCNC: 139 MG/DL
CHOLEST/HDLC SERPL: 4 {RATIO} (ref 0–5)
CO2 SERPL-SCNC: 26 MMOL/L (ref 23–31)
COLOR UR AUTO: YELLOW
CREAT SERPL-MCNC: 1.2 MG/DL (ref 0.73–1.18)
EOSINOPHIL # BLD AUTO: 0.09 X10(3)/MCL (ref 0–0.9)
EOSINOPHIL NFR BLD AUTO: 1.2 %
ERYTHROCYTE [DISTWIDTH] IN BLOOD BY AUTOMATED COUNT: 12.7 % (ref 11.5–17)
EST. AVERAGE GLUCOSE BLD GHB EST-MCNC: 159.9 MG/DL
GLOBULIN SER-MCNC: 3.1 GM/DL (ref 2.4–3.5)
GLUCOSE SERPL-MCNC: 127 MG/DL (ref 82–115)
GLUCOSE UR QL STRIP.AUTO: NEGATIVE MG/DL
HBA1C MFR BLD: 7.2 %
HCT VFR BLD AUTO: 40.3 % (ref 42–52)
HDLC SERPL-MCNC: 39 MG/DL (ref 35–60)
HGB BLD-MCNC: 13.4 GM/DL (ref 14–18)
IMM GRANULOCYTES # BLD AUTO: 0.03 X10(3)/MCL (ref 0–0.02)
IMM GRANULOCYTES NFR BLD AUTO: 0.4 % (ref 0–0.43)
KETONES UR QL STRIP.AUTO: NEGATIVE MG/DL
LDLC SERPL CALC-MCNC: 73 MG/DL (ref 50–140)
LEUKOCYTE ESTERASE UR QL STRIP.AUTO: NEGATIVE UNIT/L
LYMPHOCYTES # BLD AUTO: 1.43 X10(3)/MCL (ref 0.6–4.6)
LYMPHOCYTES NFR BLD AUTO: 19.8 %
MCH RBC QN AUTO: 30.6 PG (ref 27–31)
MCHC RBC AUTO-ENTMCNC: 33.3 MG/DL (ref 33–36)
MCV RBC AUTO: 92 FL (ref 80–94)
MONOCYTES # BLD AUTO: 0.56 X10(3)/MCL (ref 0.1–1.3)
MONOCYTES NFR BLD AUTO: 7.7 %
NEUTROPHILS # BLD AUTO: 5.1 X10(3)/MCL (ref 2.1–9.2)
NEUTROPHILS NFR BLD AUTO: 69.9 %
NITRITE UR QL STRIP.AUTO: NEGATIVE
NRBC BLD AUTO-RTO: 0 %
PH UR STRIP.AUTO: 6.5 [PH]
PLATELET # BLD AUTO: 278 X10(3)/MCL (ref 130–400)
PMV BLD AUTO: 10.5 FL (ref 9.4–12.4)
POTASSIUM SERPL-SCNC: 4.2 MMOL/L (ref 3.5–5.1)
PROT SERPL-MCNC: 7.2 GM/DL (ref 5.8–7.6)
PROT UR QL STRIP.AUTO: NEGATIVE MG/DL
PSA SERPL-MCNC: 0.32 NG/ML
RBC # BLD AUTO: 4.38 X10(6)/MCL (ref 4.7–6.1)
RBC #/AREA URNS AUTO: <5 /HPF
RBC UR QL AUTO: NEGATIVE UNIT/L
SODIUM SERPL-SCNC: 137 MMOL/L (ref 136–145)
SP GR UR STRIP.AUTO: 1.01 (ref 1–1.03)
SQUAMOUS #/AREA URNS AUTO: <5 /HPF
TRIGL SERPL-MCNC: 134 MG/DL (ref 34–140)
UROBILINOGEN UR STRIP-ACNC: 0.2 MG/DL
VLDLC SERPL CALC-MCNC: 27 MG/DL
WBC # SPEC AUTO: 7.2 X10(3)/MCL (ref 4.5–11.5)
WBC #/AREA URNS AUTO: <5 /HPF

## 2022-06-24 PROCEDURE — 85025 COMPLETE CBC W/AUTO DIFF WBC: CPT

## 2022-06-24 PROCEDURE — 36415 COLL VENOUS BLD VENIPUNCTURE: CPT

## 2022-06-24 PROCEDURE — 83036 HEMOGLOBIN GLYCOSYLATED A1C: CPT

## 2022-06-24 PROCEDURE — 81001 URINALYSIS AUTO W/SCOPE: CPT

## 2022-06-24 PROCEDURE — 84153 ASSAY OF PSA TOTAL: CPT

## 2022-06-24 PROCEDURE — 80053 COMPREHEN METABOLIC PANEL: CPT

## 2022-06-24 PROCEDURE — 80061 LIPID PANEL: CPT

## 2022-09-22 ENCOUNTER — LAB VISIT (OUTPATIENT)
Dept: LAB | Facility: HOSPITAL | Age: 62
End: 2022-09-22
Attending: INTERNAL MEDICINE
Payer: COMMERCIAL

## 2022-09-22 LAB
EST. AVERAGE GLUCOSE BLD GHB EST-MCNC: 139.9 MG/DL
HBA1C MFR BLD: 6.5 %

## 2022-09-22 PROCEDURE — 36415 COLL VENOUS BLD VENIPUNCTURE: CPT

## 2022-09-22 PROCEDURE — 83036 HEMOGLOBIN GLYCOSYLATED A1C: CPT

## 2022-12-08 LAB
LEFT EYE DM RETINOPATHY: POSITIVE
RIGHT EYE DM RETINOPATHY: POSITIVE

## 2022-12-13 ENCOUNTER — PATIENT MESSAGE (OUTPATIENT)
Dept: RESEARCH | Facility: HOSPITAL | Age: 62
End: 2022-12-13
Payer: COMMERCIAL

## 2022-12-19 ENCOUNTER — LAB VISIT (OUTPATIENT)
Dept: LAB | Facility: HOSPITAL | Age: 62
End: 2022-12-19
Attending: INTERNAL MEDICINE
Payer: COMMERCIAL

## 2022-12-19 DIAGNOSIS — N18.9 CHRONIC KIDNEY DISEASE, UNSPECIFIED: ICD-10-CM

## 2022-12-19 DIAGNOSIS — Z79.899 ENCOUNTER FOR LONG-TERM (CURRENT) USE OF OTHER MEDICATIONS: ICD-10-CM

## 2022-12-19 DIAGNOSIS — I10 ESSENTIAL HYPERTENSION, MALIGNANT: Primary | ICD-10-CM

## 2022-12-19 LAB
ALBUMIN SERPL-MCNC: 4.4 G/DL (ref 3.4–4.8)
ALBUMIN/GLOB SERPL: 1.4 RATIO (ref 1.1–2)
ALP SERPL-CCNC: 52 UNIT/L (ref 40–150)
ALT SERPL-CCNC: 29 UNIT/L (ref 0–55)
AST SERPL-CCNC: 30 UNIT/L (ref 5–34)
BILIRUBIN DIRECT+TOT PNL SERPL-MCNC: 0.8 MG/DL
BUN SERPL-MCNC: 20.1 MG/DL (ref 8.4–25.7)
CALCIUM SERPL-MCNC: 9.9 MG/DL (ref 8.8–10)
CHLORIDE SERPL-SCNC: 100 MMOL/L (ref 98–107)
CO2 SERPL-SCNC: 26 MMOL/L (ref 23–31)
CREAT SERPL-MCNC: 1.46 MG/DL (ref 0.73–1.18)
EST. AVERAGE GLUCOSE BLD GHB EST-MCNC: 151.3 MG/DL
GFR SERPLBLD CREATININE-BSD FMLA CKD-EPI: 53 MLS/MIN/1.73/M2
GLOBULIN SER-MCNC: 3.2 GM/DL (ref 2.4–3.5)
GLUCOSE SERPL-MCNC: 123 MG/DL (ref 82–115)
HBA1C MFR BLD: 6.9 %
POTASSIUM SERPL-SCNC: 3.7 MMOL/L (ref 3.5–5.1)
PROT SERPL-MCNC: 7.6 GM/DL (ref 5.8–7.6)
SODIUM SERPL-SCNC: 136 MMOL/L (ref 136–145)

## 2022-12-19 PROCEDURE — 80053 COMPREHEN METABOLIC PANEL: CPT

## 2022-12-19 PROCEDURE — 36415 COLL VENOUS BLD VENIPUNCTURE: CPT

## 2022-12-19 PROCEDURE — 83036 HEMOGLOBIN GLYCOSYLATED A1C: CPT

## 2023-09-07 ENCOUNTER — PATIENT MESSAGE (OUTPATIENT)
Dept: RESEARCH | Facility: HOSPITAL | Age: 63
End: 2023-09-07
Payer: COMMERCIAL

## 2023-10-30 ENCOUNTER — PATIENT OUTREACH (OUTPATIENT)
Dept: ADMINISTRATIVE | Facility: HOSPITAL | Age: 63
End: 2023-10-30
Payer: COMMERCIAL

## 2023-10-30 ENCOUNTER — TELEPHONE (OUTPATIENT)
Dept: FAMILY MEDICINE | Facility: CLINIC | Age: 63
End: 2023-10-30

## 2023-10-30 ENCOUNTER — OFFICE VISIT (OUTPATIENT)
Dept: FAMILY MEDICINE | Facility: CLINIC | Age: 63
End: 2023-10-30
Payer: MEDICARE

## 2023-10-30 VITALS
DIASTOLIC BLOOD PRESSURE: 62 MMHG | OXYGEN SATURATION: 98 % | HEART RATE: 84 BPM | TEMPERATURE: 98 F | WEIGHT: 224.88 LBS | RESPIRATION RATE: 20 BRPM | HEIGHT: 69 IN | BODY MASS INDEX: 33.31 KG/M2 | SYSTOLIC BLOOD PRESSURE: 138 MMHG

## 2023-10-30 DIAGNOSIS — Z79.4 TYPE 2 DIABETES MELLITUS WITHOUT COMPLICATION, WITH LONG-TERM CURRENT USE OF INSULIN: Primary | ICD-10-CM

## 2023-10-30 DIAGNOSIS — Z28.21 PNEUMOCOCCAL VACCINATION DECLINED: ICD-10-CM

## 2023-10-30 DIAGNOSIS — E11.9 TYPE 2 DIABETES MELLITUS WITHOUT COMPLICATION, WITH LONG-TERM CURRENT USE OF INSULIN: Primary | ICD-10-CM

## 2023-10-30 DIAGNOSIS — I10 ESSENTIAL HYPERTENSION: Chronic | ICD-10-CM

## 2023-10-30 DIAGNOSIS — E78.2 MIXED HYPERLIPIDEMIA: Chronic | ICD-10-CM

## 2023-10-30 DIAGNOSIS — G25.81 RLS (RESTLESS LEGS SYNDROME): ICD-10-CM

## 2023-10-30 DIAGNOSIS — R25.2 LEG CRAMPS: ICD-10-CM

## 2023-10-30 PROBLEM — E78.5 HYPERLIPIDEMIA: Chronic | Status: ACTIVE | Noted: 2018-01-11

## 2023-10-30 PROBLEM — Z12.11 SCREEN FOR COLON CANCER: Status: RESOLVED | Noted: 2017-06-09 | Resolved: 2023-10-30

## 2023-10-30 PROCEDURE — 99204 OFFICE O/P NEW MOD 45 MIN: CPT | Mod: ,,, | Performed by: FAMILY MEDICINE

## 2023-10-30 PROCEDURE — 99204 PR OFFICE/OUTPT VISIT, NEW, LEVL IV, 45-59 MIN: ICD-10-PCS | Mod: ,,, | Performed by: FAMILY MEDICINE

## 2023-10-30 RX ORDER — LANCETS
EACH MISCELLANEOUS
Qty: 100 EACH | Refills: 3 | Status: SHIPPED | OUTPATIENT
Start: 2023-10-30 | End: 2023-11-01 | Stop reason: SDUPTHER

## 2023-10-30 RX ORDER — METFORMIN HYDROCHLORIDE 500 MG/1
2000 TABLET, EXTENDED RELEASE ORAL NIGHTLY
COMMUNITY
Start: 2023-10-19

## 2023-10-30 RX ORDER — INSULIN PUMP SYRINGE, 3 ML
EACH MISCELLANEOUS
Qty: 1 EACH | Refills: 0 | Status: SHIPPED | OUTPATIENT
Start: 2023-10-30 | End: 2023-11-01 | Stop reason: SDUPTHER

## 2023-10-30 RX ORDER — AMLODIPINE AND BENAZEPRIL HYDROCHLORIDE 10; 40 MG/1; MG/1
1 CAPSULE ORAL DAILY
COMMUNITY
Start: 2023-10-19 | End: 2024-01-19 | Stop reason: SDUPTHER

## 2023-10-30 NOTE — PROGRESS NOTES
Sergo Hollingsworth  10/30/2023  [id number]    Jessica Puentes MD  Patient Care Team:  Jessica Puentes MD as PCP - General (Family Medicine)  Hilda Riley MD as Consulting Physician (Family Medicine)  Maricarmen Blandon MD (Cardiovascular Disease)  Inés Blanchard LPN (Inactive) as Licensed Practical Nurse  Mickey Kahn MD as Consulting Physician (Gastroenterology)  Keila Alex MD (Inactive) as Consulting Physician (Ophthalmology)  LU Velázquez OD as Consulting Physician (Optometry)  Emily Pate LPN as Care Coordinator (Family Medicine)      Chief Complaint:  Chief Complaint   Patient presents with    Establish Care     Needs PCP-LLE muscle aches, calf and shin       History of Present Illness:    63 y.o. male who presents today to establish care. HE has HTN, HLD, DMII on insulin. Today, he c/o left LE pain. He c/o calf and shin muscle pain for months intermittently, but it does happen daily. It happens when sitting and when walking. Pain lasts for about an hour and no relieving factors.      Review of Systems  General: denies f/c, weight loss, night sweats, decreased appetite  Eye: denies blurred vision, changes in vision  Respiratory: denies sob, wheezing, cough  Cardiovascular: denies chest pain, palpitations, edema  Gastrointestinal: denies abdominal pain, n/v, constipation, diarrhea  Integumentary: denies rashes, pruritis    Past Medical History  Past Medical History:   Diagnosis Date    Arthritis     Diabetes mellitus, type 2     Hypertension        Medications  Medication List with Changes/Refills   New Medications    BLOOD SUGAR DIAGNOSTIC STRP    To check BG 1 times daily, to use with insurance preferred meter    BLOOD-GLUCOSE METER KIT    To check BG 1 times daily, to use with insurance preferred meter    LANCETS MISC    To check BG 1 times daily, to use with insurance preferred meter   Current Medications    AMLODIPINE-BENAZEPRIL (LOTREL) 10-40 MG PER  "CAPSULE    Take 1 capsule by mouth Daily.    ASPIRIN (ECOTRIN) 81 MG EC TABLET    Take 1 tablet (81 mg total) by mouth once daily.    ATORVASTATIN (LIPITOR) 10 MG TABLET    TAKE 1 TABLET DAILY    GABAPENTIN (NEURONTIN) 300 MG CAPSULE    Take 1 capsule (300 mg total) by mouth every evening.    INSULIN (LANTUS SOLOSTAR U-100 INSULIN) GLARGINE 100 UNITS/ML (3ML) SUBQ PEN    Inject 20 Units into the skin every evening.    METFORMIN (GLUCOPHAGE-XR) 500 MG ER 24HR TABLET    Take 2,000 mg by mouth nightly.    PEN NEEDLE, DIABETIC (PEN NEEDLE) 30 GAUGE X 5/16" NDLE    Use once a day with insulin    SITAGLIPTIN (JANUVIA) 100 MG TAB    Take 1 tablet (100 mg total) by mouth once daily.   Discontinued Medications    AMLODIPINE-BENAZEPRIL 5-20 MG (LOTREL) 5-20 MG PER CAPSULE    TAKE 1 CAPSULE DAILY    BLOOD SUGAR DIAGNOSTIC STRP    One Touch Ultra test strips. Check your glucose qday    BLOOD-GLUCOSE METER (BLOOD GLUCOSE MONITORING) KIT    Use as instructed.  Free Style Whit    METFORMIN (GLUCOPHAGE) 1000 MG TABLET    TAKE 1 TABLET TWICE A DAY WITH MEALS    TRIAMTERENE-HYDROCHLOROTHIAZIDE 37.5-25 MG (DYAZIDE) 37.5-25 MG PER CAPSULE    TAKE 1 CAPSULE EVERY MORNING       Past Surgical History:   Procedure Laterality Date    COLONOSCOPY N/A 6/9/2017    Procedure: COLONOSCOPY;  Surgeon: Mickey Kahn MD;  Location: [facility];  Service: Endoscopy;  Laterality: N/A;    KNEE SURGERY  2010    right knee to repair a torn meniscus       SUBJECTIVE:  Health Maintenance  Health Maintenance Topics with due status: Not Due       Topic Last Completion Date    TETANUS VACCINE 04/28/2017    Colorectal Cancer Screening 06/09/2017    Lipid Panel 06/29/2023    Hemoglobin A1c 06/29/2023     Health Maintenance Due   Topic Date Due    Shingles Vaccine (1 of 2) Never done    RSV Vaccine (Age 60+) (1 - 1-dose 60+ series) Never done    Pneumococcal Vaccines (Age 65+) (2 - PCV) 02/14/2023    Abdominal Aortic Aneurysm Screening  Never done    " "Influenza Vaccine (1) 09/01/2023    COVID-19 Vaccine (6 - 2023-24 season) 09/01/2023       Exam:  Vitals:    10/30/23 1020 10/30/23 1112   BP: (!) 144/84 138/62   BP Location: Right arm Right arm   Patient Position: Sitting Sitting   BP Method: Large (Automatic) Large (Manual)   Pulse: 84    Resp: 20    Temp: 98.3 °F (36.8 °C)    TempSrc: Oral    SpO2: 98%    Weight: 102 kg (224 lb 14.4 oz)    Height: 5' 9" (1.753 m)      Weight: 102 kg (224 lb 14.4 oz)   Body mass index is 33.21 kg/m².      Physical Exam  Constitutional: NAD, alert, pleasant  Respiratory: CTAB, no wheezes, rales or rhonchi. No accessory muscle use  Eyes: EOMI  Cardiovascular: RRR, No m/r/g. No JVD. 1+ pitting bl LE edema. DP2+ bilaterally  Integumentary: warm, dry, intact  Psych: AA&Ox3    Protective Sensation (w/ 10 gram monofilament):  Right: Intact  Left: Intact    Visual Inspection:  Normal -  Bilateral    Pedal Pulses:   Right: Present  Left: Present    Posterior Tibialis Pulses:   Right:Present  Left: Present      ICD-10-CM ICD-9-CM   1. Type 2 diabetes mellitus without complication, with long-term current use of insulin  E11.9 250.00    Z79.4 V58.67   2. Mixed hyperlipidemia  E78.2 272.2   3. Leg cramps  R25.2 729.82   4. RLS (restless legs syndrome)  G25.81 333.94   5. Pneumococcal vaccination declined  Z28.21 V64.06   6. Essential hypertension  I10 401.9       1. Type 2 diabetes mellitus without complication, with long-term current use of insulin  Overview:  a1c at goal, 5.9.  Does not check sugars. No lows. Needs a new meter. HE has been taking januvia qod for the past week because he is in the medicare donut and cannot afford a $400 script at this time. Compliant with insulin. \    rtc 1 wk with labs  May d/c januvia and try glipizide in the meantime or simply increase insulin    Orders:  -     CBC Auto Differential; Future; Expected date: 10/30/2023  -     Comprehensive Metabolic Panel; Future; Expected date: 10/30/2023  -     Cancel: " Lipid Panel; Future; Expected date: 10/30/2023  -     TSH; Future; Expected date: 10/30/2023  -     Hemoglobin A1C; Future; Expected date: 10/30/2023  -     Urinalysis; Future; Expected date: 10/30/2023  -     Microalbumin/Creatinine Ratio, Urine; Future; Expected date: 10/30/2023  -     blood-glucose meter kit; To check BG 1 times daily, to use with insurance preferred meter  Dispense: 1 each; Refill: 0  -     lancets Misc; To check BG 1 times daily, to use with insurance preferred meter  Dispense: 100 each; Refill: 3  -     blood sugar diagnostic Strp; To check BG 1 times daily, to use with insurance preferred meter  Dispense: 100 strip; Refill: 3    2. Mixed hyperlipidemia  Overview:  ldl at goal on statin. Having some left LE cramps.    Continue current Rx meds for now  Will check ck level        3. Leg cramps  Comments:  check ck level. if normal, may need arterial US  Orders:  -     CK; Future; Expected date: 10/30/2023    4. RLS (restless legs syndrome)  Overview:  Well controlled with gabapentin at bedtime      5. Pneumococcal vaccination declined    6. Essential hypertension  Overview:  At goan on current meds. + le edema.     Continue current Rx meds  Labs ordered  Compression stockings and low salt diet encouraged  Was on maxzide but likely d/c'ed due to decreased renal function           Follow up: Follow up for 1 wks medicare wellness with labs.      Care Plan/Goals: Reviewed   Goals    None

## 2023-10-30 NOTE — LETTER
"  This communication is flagged as high priority.        AUTHORIZATION FOR RELEASE OF   CONFIDENTIAL INFORMATION    Dear Staff,    We are seeing Sergo Hollingsworth, date of birth 1960, in the clinic at [facility]. Jessica Puentes MD is the patient's PCP. Sergo Hollingsworth has an outstanding lab/procedure at the time we reviewed his chart. In order to help keep his health information updated, he has authorized us to request the following medical record(s):        (  )  MAMMOGRAM                                      (  )  COLONOSCOPY      (  )  PAP SMEAR                                          (  )  OUTSIDE LAB RESULTS     (  )  DEXA SCAN                                          ( xx )  DM EYE EXAM            (  )  FOOT EXAM                                          (  )  ENTIRE RECORD     (  )  OUTSIDE IMMUNIZATIONS                 (  )  _______________         Please fax records to [facility], Bienvenu-Oubre, Shauna, MD,  [phone]  Attn: Chanelle      If you have any questions, please contact Tk Sam" [facility] Coordinator @ [phone]     Patient Name: Sergo Hollingsworth  : 1960  Patient Phone #: [phone]     "

## 2023-10-30 NOTE — LETTER
"  This communication is flagged as high priority.        AUTHORIZATION FOR RELEASE OF   CONFIDENTIAL INFORMATION    Dear Staff,    We are seeing Sergo Hollingsworth, date of birth 1960, in the clinic at [facility]. Jessica Puentes MD is the patient's PCP. Sergo Hollingsworth has an outstanding lab/procedure at the time we reviewed his chart. In order to help keep his health information updated, he has authorized us to request the following medical record(s):        (  )  MAMMOGRAM                                      (  )  COLONOSCOPY      (  )  PAP SMEAR                                          (  )  OUTSIDE LAB RESULTS     (  )  DEXA SCAN                                          ( xx )  DM EYE EXAM            (  )  FOOT EXAM                                          (  )  ENTIRE RECORD     (  )  OUTSIDE IMMUNIZATIONS                 (  )  _______________         Please fax records to [facility], Bienvenu-Oubre, Shauna, MD,  [phone]  Attn: Chanelle      If you have any questions, please contact Tk [facility]" [facility] Coordinator @ [phone]     Patient Name: Sergo Hollingsworth  : 1960  Patient Phone #: [phone]     "

## 2023-11-01 ENCOUNTER — TELEPHONE (OUTPATIENT)
Dept: FAMILY MEDICINE | Facility: CLINIC | Age: 63
End: 2023-11-01
Payer: MEDICARE

## 2023-11-01 DIAGNOSIS — Z79.4 TYPE 2 DIABETES MELLITUS WITHOUT COMPLICATION, WITH LONG-TERM CURRENT USE OF INSULIN: ICD-10-CM

## 2023-11-01 DIAGNOSIS — E11.9 TYPE 2 DIABETES MELLITUS WITHOUT COMPLICATION, WITH LONG-TERM CURRENT USE OF INSULIN: ICD-10-CM

## 2023-11-01 RX ORDER — LANCETS
EACH MISCELLANEOUS
Qty: 100 EACH | Refills: 3 | Status: SHIPPED | OUTPATIENT
Start: 2023-11-01 | End: 2023-11-03 | Stop reason: SDUPTHER

## 2023-11-01 RX ORDER — INSULIN PUMP SYRINGE, 3 ML
EACH MISCELLANEOUS
Qty: 1 EACH | Refills: 0 | Status: SHIPPED | OUTPATIENT
Start: 2023-11-01 | End: 2023-11-03 | Stop reason: SDUPTHER

## 2023-11-01 NOTE — TELEPHONE ENCOUNTER
----- Message from [facility] sent at 10/31/2023  9:43 AM CDT -----  .Type:  RX Refill Request    Who Called:  pt    Refill or New Rx: correct on pharmacy    RX Name and Strength: blood sugar diagnostic Strp, blood-glucose meter kit, lancets Misc    How is the patient currently taking it? (ex. 1XDay):    Is this a 30 day or 90 day RX: 90    Preferred Pharmacy with phone number: [facility]    Local or Mail Order: mail    Ordering Provider: Jessica    Would the patient rather a call back? Yes    Best Call Back Number: [phone]    Additional Information:  pt had the wrong mail in pharmacy it should be sent to [facility]

## 2023-11-01 NOTE — TELEPHONE ENCOUNTER
Pt notified that scripts for the diabetic testing supplies were sent to [facility]

## 2023-11-01 NOTE — TELEPHONE ENCOUNTER
Pt would like his diabetic testing supplies to be sent to [facility]. Please advise

## 2023-11-02 ENCOUNTER — PATIENT MESSAGE (OUTPATIENT)
Dept: FAMILY MEDICINE | Facility: CLINIC | Age: 63
End: 2023-11-02
Payer: MEDICARE

## 2023-11-02 DIAGNOSIS — E11.9 TYPE 2 DIABETES MELLITUS WITHOUT COMPLICATION, WITH LONG-TERM CURRENT USE OF INSULIN: ICD-10-CM

## 2023-11-02 DIAGNOSIS — Z79.4 TYPE 2 DIABETES MELLITUS WITHOUT COMPLICATION, WITH LONG-TERM CURRENT USE OF INSULIN: ICD-10-CM

## 2023-11-02 PROCEDURE — 80053 COMPREHEN METABOLIC PANEL: CPT | Performed by: FAMILY MEDICINE

## 2023-11-02 PROCEDURE — 84443 ASSAY THYROID STIM HORMONE: CPT | Performed by: FAMILY MEDICINE

## 2023-11-02 PROCEDURE — 82550 ASSAY OF CK (CPK): CPT | Performed by: FAMILY MEDICINE

## 2023-11-02 PROCEDURE — 85025 COMPLETE CBC W/AUTO DIFF WBC: CPT | Performed by: FAMILY MEDICINE

## 2023-11-02 PROCEDURE — 81001 URINALYSIS AUTO W/SCOPE: CPT | Performed by: FAMILY MEDICINE

## 2023-11-02 PROCEDURE — 83036 HEMOGLOBIN GLYCOSYLATED A1C: CPT | Performed by: FAMILY MEDICINE

## 2023-11-02 PROCEDURE — 82043 UR ALBUMIN QUANTITATIVE: CPT | Performed by: FAMILY MEDICINE

## 2023-11-03 RX ORDER — INSULIN PUMP SYRINGE, 3 ML
EACH MISCELLANEOUS
Qty: 1 EACH | Refills: 0 | Status: SHIPPED | OUTPATIENT
Start: 2023-11-03

## 2023-11-03 RX ORDER — LANCETS
EACH MISCELLANEOUS
Qty: 100 EACH | Refills: 3 | Status: SHIPPED | OUTPATIENT
Start: 2023-11-03

## 2023-11-06 ENCOUNTER — OFFICE VISIT (OUTPATIENT)
Dept: FAMILY MEDICINE | Facility: CLINIC | Age: 63
End: 2023-11-06
Payer: MEDICARE

## 2023-11-06 VITALS
TEMPERATURE: 98 F | HEART RATE: 80 BPM | DIASTOLIC BLOOD PRESSURE: 78 MMHG | HEIGHT: 69 IN | RESPIRATION RATE: 18 BRPM | SYSTOLIC BLOOD PRESSURE: 135 MMHG | BODY MASS INDEX: 33.96 KG/M2 | OXYGEN SATURATION: 98 % | WEIGHT: 229.31 LBS

## 2023-11-06 DIAGNOSIS — G25.81 RLS (RESTLESS LEGS SYNDROME): Chronic | ICD-10-CM

## 2023-11-06 DIAGNOSIS — E78.2 MIXED HYPERLIPIDEMIA: Chronic | ICD-10-CM

## 2023-11-06 DIAGNOSIS — Z28.21 PNEUMOCOCCAL VACCINATION DECLINED BY PATIENT: ICD-10-CM

## 2023-11-06 DIAGNOSIS — Z12.5 SCREENING PSA (PROSTATE SPECIFIC ANTIGEN): ICD-10-CM

## 2023-11-06 DIAGNOSIS — E66.01 CLASS 2 SEVERE OBESITY WITH SERIOUS COMORBIDITY AND BODY MASS INDEX (BMI) OF 36.0 TO 36.9 IN ADULT, UNSPECIFIED OBESITY TYPE: ICD-10-CM

## 2023-11-06 DIAGNOSIS — Z79.4 TYPE 2 DIABETES MELLITUS WITHOUT COMPLICATION, WITH LONG-TERM CURRENT USE OF INSULIN: ICD-10-CM

## 2023-11-06 DIAGNOSIS — Z00.00 MEDICARE ANNUAL WELLNESS VISIT, INITIAL: Primary | ICD-10-CM

## 2023-11-06 DIAGNOSIS — R60.0 EDEMA, LOWER EXTREMITY: ICD-10-CM

## 2023-11-06 DIAGNOSIS — I10 ESSENTIAL HYPERTENSION: Chronic | ICD-10-CM

## 2023-11-06 DIAGNOSIS — Z28.21 INFLUENZA VACCINATION DECLINED BY PATIENT: ICD-10-CM

## 2023-11-06 DIAGNOSIS — E11.9 TYPE 2 DIABETES MELLITUS WITHOUT COMPLICATION, WITH LONG-TERM CURRENT USE OF INSULIN: ICD-10-CM

## 2023-11-06 PROCEDURE — G0402 INITIAL PREVENTIVE EXAM: HCPCS | Mod: ,,, | Performed by: FAMILY MEDICINE

## 2023-11-06 PROCEDURE — G0402 PR WELCOME MEDICARE PREVENTIVE VISIT NEW ENROLLEE: ICD-10-PCS | Mod: ,,, | Performed by: FAMILY MEDICINE

## 2023-11-06 RX ORDER — GLIPIZIDE 5 MG/1
5 TABLET, FILM COATED, EXTENDED RELEASE ORAL
Qty: 30 TABLET | Refills: 1 | Status: SHIPPED | OUTPATIENT
Start: 2023-11-06 | End: 2024-01-08 | Stop reason: SDUPTHER

## 2023-11-06 NOTE — PROGRESS NOTES
Patient ID: [id number]     Chief Complaint: Medicare AWV (Medicare Wellness with lab results)      HPI:     Sergo Hollingsworth is a 63 y.o. male here today for a Medicare Wellness. Labs reviewed by me and were discussed with patient. All questions regarding lab results were answered.         Opioid Screening: Patient medication list reviewed, patient is not taking prescription opioids. Patient is not using additional opioids than prescribed. Patient is at low risk of substance abuse based on this opioid use history.       ----------------------------  Arthritis  Diabetes mellitus, type 2  Hypertension     Past Surgical History:   Procedure Laterality Date    COLONOSCOPY N/A 6/9/2017    Procedure: COLONOSCOPY;  Surgeon: Mickey Kahn MD;  Location: [facility];  Service: Endoscopy;  Laterality: N/A;    KNEE SURGERY  2010    right knee to repair a torn meniscus       Review of patient's allergies indicates:  No Known Allergies    Outpatient Medications Marked as Taking for the 11/6/23 encounter (Office Visit) with Jessica Puentes MD   Medication Sig Dispense Refill    amLODIPine-benazepriL (LOTREL) 10-40 mg per capsule Take 1 capsule by mouth Daily.      aspirin (ECOTRIN) 81 MG EC tablet Take 1 tablet (81 mg total) by mouth once daily.  0    atorvastatin (LIPITOR) 10 MG tablet TAKE 1 TABLET DAILY 90 tablet 3    blood sugar diagnostic Strp To check BG 1 times daily, to use with insurance preferred meter 100 strip 3    blood-glucose meter kit To check BG 1 times daily, to use with insurance preferred meter 1 each 0    gabapentin (NEURONTIN) 300 MG capsule Take 1 capsule (300 mg total) by mouth every evening. (Patient taking differently: Take 300 mg by mouth Daily.) 30 capsule 11    insulin (LANTUS SOLOSTAR U-100 INSULIN) glargine 100 units/mL (3mL) SubQ pen Inject 20 Units into the skin every evening. (Patient taking differently: Inject 24 Units into the skin every evening.) 2 Box 3    lancets Misc To check BG  "1 times daily, to use with insurance preferred meter 100 each 3    metFORMIN (GLUCOPHAGE-XR) 500 MG ER 24hr tablet Take 2,000 mg by mouth nightly.      pen needle, diabetic (PEN NEEDLE) 30 gauge x 5/16" Ndle Use once a day with insulin 100 each 3    [DISCONTINUED] SITagliptin (JANUVIA) 100 MG Tab Take 1 tablet (100 mg total) by mouth once daily. 90 tablet 3       Social History     Socioeconomic History    Marital status:    Occupational History    Occupation:    Tobacco Use    Smoking status: Never    Smokeless tobacco: Current     Types: Chew   Substance and Sexual Activity    Alcohol use: Yes     Comment: social    Drug use: No    Sexual activity: Yes     Partners: Female     Social Determinants of Health     Financial Resource Strain: Low Risk  (11/6/2023)    Overall Financial Resource Strain (CARDIA)     Difficulty of Paying Living Expenses: Not hard at all   Food Insecurity: No Food Insecurity (11/6/2023)    Hunger Vital Sign     Worried About Running Out of Food in the Last Year: Never true     Ran Out of Food in the Last Year: Never true   Transportation Needs: No Transportation Needs (11/6/2023)    PRAPARE - Transportation     Lack of Transportation (Medical): No     Lack of Transportation (Non-Medical): No   Physical Activity: Inactive (11/6/2023)    Exercise Vital Sign     Days of Exercise per Week: 3 days     Minutes of Exercise per Session: 0 min   Stress: No Stress Concern Present (11/1/2023)    [place] [place] [employer] - Occupational Stress Questionnaire     Feeling of Stress : Only a little   Social Connections: Unknown (11/6/2023)    Social Connection and Isolation Panel [NHANES]     Frequency of Communication with Friends and Family: More than three times a week     Frequency of Social Gatherings with Friends and Family: More than three times a week     Active Member of Clubs or Organizations: No     Attends Club or Organization Meetings: Never     Marital " Status:    Housing Stability: Low Risk  (11/6/2023)    Housing Stability Vital Sign     Unable to Pay for Housing in the Last Year: No     Number of Places Lived in the Last Year: 1     Unstable Housing in the Last Year: No        Family History   Problem Relation Age of Onset    Pancreatic cancer Mother     Cancer Mother     Lung cancer Father     Cancer Father     Diabetes Sister     Hypertension Sister     Hypertension Brother     Heart disease Maternal Grandmother     Hypertension Maternal Grandmother     Throat cancer Maternal Grandfather     Diabetes Paternal Grandmother     Heart disease Paternal Grandmother     Hypertension Paternal Grandmother     Lung cancer Paternal Grandfather     Amblyopia Neg Hx     Blindness Neg Hx     Cataracts Neg Hx     Glaucoma Neg Hx     Macular degeneration Neg Hx     Retinal detachment Neg Hx     Strabismus Neg Hx     Thyroid disease Neg Hx     Stroke Neg Hx         Patient Care Team:  Jessica Puentes MD as PCP - General (Family Medicine)  Hilda Riley MD as Consulting Physician (Family Medicine)  Maricarmen Blandon MD (Cardiovascular Disease)  Inés Blanchard LPN (Inactive) as Licensed Practical Nurse  Mickey Kahn MD as Consulting Physician (Gastroenterology)  Keila Alex MD (Inactive) as Consulting Physician (Ophthalmology)  LU Velázquez, DOROTA as Consulting Physician (Optometry)  Emily Pate LPN as Care Coordinator (Family Medicine)       Subjective:     ROS  General: denies f/c, weight loss, night sweats, decreased appetite  Eye: denies blurred vision, changes in vision  Respiratory: denies sob, wheezing, cough  Cardiovascular: denies chest pain, palpitations, + occasional edema  Gastrointestinal: denies abdominal pain, n/v, constipation, diarrhea  Integumentary: denies rashes, pruritis        Patient Reported Health Risk Assessment  What [age] 65-67  Are you male or female?: Female  During the past four weeks,  how much have you been bothered by emotional problems such as feeling anxious, depressed, irritable, sad, or downhearted and blue?: Not at all  During the past five weeks, has your physical and/or emotional health limited your social activities with family, friends, neighbors, or groups?: Not at all  During the past four weeks, how much bodily pain have you generally had?: Mild pain  During the past four weeks, was someone available to help if you needed and wanted help?: Yes, as much as I wanted  During the past four weeks, what was the hardest physical activity you could do for at least two minutes?: Heavy  Can you get to places out of walking distance without help?  (For example, can you travel alone on buses or taxis, or drive your own car?): Yes  Can you go shopping for groceries or clothes without someone's help?: Yes  Can you prepare your own meals?: Yes  Can you do your own housework without help?: Yes  Because of any health problems, do you need the help of another person with your personal care needs such as eating, bathing, dressing, or getting around the house?: No  Can you handle your own money without help?: Yes  During the past four weeks, how would you rate your health in general?: Very good  How have things been going for you during the past four weeks?: Pretty well  Are you having difficulties driving your car?: No  Do you always fasten your seat belt when you are in a car?: Yes, usually  How often in the past four weeks have you been bothered by falling or dizzy when standing up?: Sometimes  How often in the past four weeks have you been bothered by trouble eating well?: Never  How often in the past four weeks have you been bothered by teeth or denture problems?: Never  How often in the past four weeks have you been bothered with problems using the telephone?: Never  How often in the past four weeks have you been bothered by tiredness or fatigue?: Never  Have you fallen two or more times in the  "past year?: No  Are you afraid of falling?: No  Are you a smoker?: No  During the past four weeks, how many drinks of wine, beer, or other alcoholic beverages did you have?: No alcohol at all  Do you exercise for about 20 minutes three or more days a week?: Yes, most of the time  Have you been given any information to help you with hazards in your house that might hurt you?: No  Have you been given any information to help you with keeping track of your medications?: No  How often do you have trouble taking medicines the way you've been told to take them?: I always take them as prescribed  How confident are you that you can control and manage most of your health problems?: Very confident  What is your race? (Check all that apply.):     Objective:     /78 (BP Location: Left arm, Patient Position: Sitting, BP Method: Large (Automatic))   Pulse 80   Temp 98.1 °F (36.7 °C) (Oral)   Resp 18   Ht 5' 9" (1.753 m)   Wt 104 kg (229 lb 4.8 oz)   SpO2 98%   BMI 33.86 kg/m²     Physical Exam    Gen: alert pleasant  Eyes: EOMI, no scleral injection  ENT: TM+ light reflexes bilaterally, no cerumen impaction or canal edema/erythema/discharge. No pharyngeal erythema, tonsillary hypertrophy  Oropharynx: no tonsillary hypertrophy, dental caries or disease. No lesions of palate, tongue.  CV: RRR no m/r/g, 1+ bl LE edema. No JVD  Resp: CTAB, no accessory muscle use  GI: BS+, nontender, nondistended  Psych: AA&Ox3           No data to display                  11/6/2023    10:30 AM 10/30/2023    10:15 AM 12/3/2020     9:20 AM 10/2/2020     7:15 AM 8/21/2020     7:15 AM 7/23/2020     7:40 AM 3/20/2020     7:40 AM   Fall Risk Assessment - Outpatient   Mobility Status Ambulatory Ambulatory Ambulatory Ambulatory Ambulatory Ambulatory Ambulatory   Number of falls 1 1 0 0 0 0 0   Identified as fall risk False False False False False False False           Depression Screening  Over the past two weeks, has the patient felt " down, depressed, or hopeless?: No  Over the past two weeks, has the patient felt little interest or pleasure in doing things?: No  Functional Ability/Safety Screening  Was the patient's timed Up & Go test unsteady or longer than 30 seconds?: No  Does the patient need help with phone, transportation, shopping, preparing meals, housework, laundry, meds, or managing money?: No  Does the patient's home have rugs in the hallway, lack grab bars in the bathroom, lack handrails on the stairs or have poor lighting?: No  Have you noticed any hearing difficulties?: No  Cognitive Function (Assessed through direct observation with due consideration of information obtained by way of patient reports and/or concerns raised by family, friends, caretakers, or others)    Does the patient repeat questions/statements in the same day?: No  Does the patient have trouble remembering the date, year, and time?: No  Does the patient have difficulty managing finances?: No  Does the patient have a decreased sense of direction?: No  Assessment/Plan:     1. Medicare annual wellness visit, initial    2. Class 2 severe obesity with serious comorbidity and body mass index (BMI) of 36.0 to 36.9 in adult, unspecified obesity type  Overview:  Patient stay active by walking around [place] outside. Eats a regular diet.     - exercise for 30-45 min a day, 5x a week  - eat a total of 1800 calories daily with less than 70 total carbohydrates daily  - use my fitness pal to log foods and track calories  - eat lean meats like chicken, turkey, fish, lean ground beef. Avoid fried, fatty or processed foods.  - do not eat pasta, bread, rice, potatoes often        3. Essential hypertension  Overview:  At [place] on current meds. + le edema.     Continue current Rx meds      Orders:  -     CBC Auto Differential; Future; Expected date: 11/06/2024  -     Comprehensive Metabolic Panel; Future; Expected date: 11/06/2024  -     Urinalysis; Future; Expected date:  11/06/2024    4. Mixed hyperlipidemia  Overview:  ldl at goal on statin.     Continue current Rx meds for now      Orders:  -     CBC Auto Differential; Future; Expected date: 11/06/2024  -     Comprehensive Metabolic Panel; Future; Expected date: 11/06/2024  -     Lipid Panel; Future; Expected date: 11/06/2024  -     Hemoglobin A1C; Future; Expected date: 11/06/2024    5. Type 2 diabetes mellitus without complication, with long-term current use of insulin  Overview:  a1c at goal, 6.0.  Does not check sugars. No lows.HE has been taking januvia qod for the past week because he is in the medicare donut and cannot afford a $400 script at this time. Compliant with insulin.     D/c januvia due to cost and start glipizide 5 mg daily  Adhere to a low carb/sugar diet that is also low in fat, but high in protein. Eat foods such as baked chicken, turkey, low fat ground beef, fish. Increase portions of vegetables. Avoid soft drinks, sweet drinks and stick to mainly water.   Discussed diabetes diagnosis and associated risk factors in detail with patient. All questions were answered  We discussed dietary changes and weight loss extensively  Discussed need for yearly eye and foot exams  I recommend checking fasting sugars daily and bring log to every visit. Call me if sugars start increasing even if it is months before your next visit  Keep a snack on you at all times for in case your sugar drops too low    -Adhere to a low carb/sugar diet that is also low in fat, but high in protein. Eat foods such as baked chicken, turkey, low fat ground beef, fish. Increase portions of vegetables. Avoid soft drinks, sweet drinks and stick to mainly water.   - continue current meds and monitor for signs/symptoms of hypoglycemia. Call me with any sugars below 80.  - do not skip meals as this can lead to hypoglycemia  - exercise for a total of 150 min per week      rtc 3 mts with labs    Orders:  -     Hemoglobin A1C; Future; Expected date:  05/06/2024  -     Comprehensive Metabolic Panel; Future; Expected date: 05/06/2024  -     Microalbumin/Creatinine Ratio, Urine; Future; Expected date: 05/06/2024  -     TSH; Future; Expected date: 11/06/2024  -     Hemoglobin A1C; Future; Expected date: 11/06/2024  -     glipiZIDE 5 MG TR24; Take 1 tablet (5 mg total) by mouth daily with breakfast.  Dispense: 30 tablet; Refill: 1    6. RLS (restless legs syndrome)  Overview:  Well controlled with gabapentin at bedtime    Continue current Rx meds        7. Screening PSA (prostate specific antigen)  -     PSA, Screening; Future; Expected date: 11/06/2024    8. Pneumococcal vaccination declined by patient    9. Influenza vaccination declined by patient    10. Edema, lower extremity  Comments:  patient declined diuretic at this time  low salt diet and compression stockings encouraged  he also declined arterial us Medicare Annual Wellness and Personalized Prevention Plan:   Fall Risk + Home Safety + Hearing Impairment + Depression Screen + Opioid and Substance Abuse Screening + Cognitive Impairment Screen + Health Risk Assessment all reviewed.     Health Maintenance Topics with due status: Not Due       Topic Last Completion Date    TETANUS VACCINE 04/28/2017    Colorectal Cancer Screening 06/09/2017    Lipid Panel 06/29/2023    Low Dose Statin 10/30/2023    Foot Exam 10/30/2023    Diabetes Urine Screening 11/02/2023    Hemoglobin A1c 11/02/2023      The patient's Health Maintenance was reviewed and the following appears to be due at this time:   Health Maintenance Due   Topic Date Due    RSV Vaccine (Age 60+) (1 - 1-dose 60+ series) Never done    Eye Exam  10/08/2021    Abdominal Aortic Aneurysm Screening  Never done    COVID-19 Vaccine (6 - 2023-24 season) 09/01/2023       Patient was counseled on risks/benefits of receiving immunization. All questions were answered    Advance Care Planning     Date: 11/06/2023  Patient did not wish or was not able to name a  surrogate decision maker or provide an Advance Care Plan.       Advance Care Planning   I attest to discussing Advance Care Planning with patient and/or family member.  Education was provided including the importance of the Health Care Power of , Advance Directives, and/or [place] documentation.  The patient expressed understanding to the importance of this information and discussion.         Follow up for 3 mts diabetes with labs and one year medicar wellness with labs. In addition to their scheduled follow up, the patient has also been instructed to follow up on as needed basis.

## 2023-11-08 ENCOUNTER — PATIENT OUTREACH (OUTPATIENT)
Dept: ADMINISTRATIVE | Facility: HOSPITAL | Age: 63
End: 2023-11-08
Payer: MEDICARE

## 2023-11-08 PROBLEM — E11.3293 MILD NONPROLIFERATIVE DIABETIC RETINOPATHY OF BOTH EYES WITHOUT MACULAR EDEMA ASSOCIATED WITH TYPE 2 DIABETES MELLITUS: Status: ACTIVE | Noted: 2023-11-08

## 2023-11-16 ENCOUNTER — PATIENT MESSAGE (OUTPATIENT)
Dept: FAMILY MEDICINE | Facility: CLINIC | Age: 63
End: 2023-11-16
Payer: MEDICARE

## 2023-11-24 ENCOUNTER — PATIENT MESSAGE (OUTPATIENT)
Dept: FAMILY MEDICINE | Facility: CLINIC | Age: 63
End: 2023-11-24
Payer: MEDICARE

## 2024-01-05 ENCOUNTER — PATIENT MESSAGE (OUTPATIENT)
Dept: FAMILY MEDICINE | Facility: CLINIC | Age: 64
End: 2024-01-05
Payer: MEDICARE

## 2024-01-05 DIAGNOSIS — E11.65 TYPE 2 DIABETES MELLITUS WITH HYPERGLYCEMIA, WITH LONG-TERM CURRENT USE OF INSULIN: ICD-10-CM

## 2024-01-05 DIAGNOSIS — Z79.4 TYPE 2 DIABETES MELLITUS WITH HYPERGLYCEMIA, WITH LONG-TERM CURRENT USE OF INSULIN: ICD-10-CM

## 2024-01-05 DIAGNOSIS — E11.9 TYPE 2 DIABETES MELLITUS WITHOUT COMPLICATION, WITH LONG-TERM CURRENT USE OF INSULIN: ICD-10-CM

## 2024-01-05 DIAGNOSIS — Z79.4 TYPE 2 DIABETES MELLITUS WITHOUT COMPLICATION, WITH LONG-TERM CURRENT USE OF INSULIN: ICD-10-CM

## 2024-01-08 RX ORDER — GABAPENTIN 300 MG/1
300 CAPSULE ORAL DAILY
Qty: 90 CAPSULE | Refills: 3 | Status: SHIPPED | OUTPATIENT
Start: 2024-01-08 | End: 2024-01-22 | Stop reason: SDUPTHER

## 2024-01-08 RX ORDER — GLIPIZIDE 5 MG/1
5 TABLET, FILM COATED, EXTENDED RELEASE ORAL
Qty: 90 TABLET | Refills: 3 | Status: SHIPPED | OUTPATIENT
Start: 2024-01-08 | End: 2024-01-22 | Stop reason: SDUPTHER

## 2024-01-19 ENCOUNTER — TELEPHONE (OUTPATIENT)
Dept: FAMILY MEDICINE | Facility: CLINIC | Age: 64
End: 2024-01-19
Payer: MEDICARE

## 2024-01-19 DIAGNOSIS — E78.2 MIXED HYPERLIPIDEMIA: ICD-10-CM

## 2024-01-19 DIAGNOSIS — I10 ESSENTIAL HYPERTENSION: Primary | ICD-10-CM

## 2024-01-19 RX ORDER — AMLODIPINE AND BENAZEPRIL HYDROCHLORIDE 10; 40 MG/1; MG/1
1 CAPSULE ORAL DAILY
Qty: 90 CAPSULE | Refills: 3 | Status: SHIPPED | OUTPATIENT
Start: 2024-01-19 | End: 2024-01-23 | Stop reason: SDUPTHER

## 2024-01-19 RX ORDER — ATORVASTATIN CALCIUM 10 MG/1
10 TABLET, FILM COATED ORAL DAILY
Qty: 90 TABLET | Refills: 3 | Status: SHIPPED | OUTPATIENT
Start: 2024-01-19 | End: 2024-01-23 | Stop reason: SDUPTHER

## 2024-01-19 NOTE — TELEPHONE ENCOUNTER
----- Message from [facility] sent at 1/19/2024 10:08 AM CST -----  Regarding: refill request  .Type:  RX Refill Request    Who Called: PT    Refill or New Rx:Refill    RX Name and Strength:amLODIPine-benazepriL (LOTREL) 10-40 mg per capsule - - 10/19/2023 - --  Sig - Route: Take 1 capsule by mouth Daily. - Oral        atorvastatin (LIPITOR) 10 MG tablet 90 tablet 3 12/14/2020 - No  Sig: TAKE 1 TABLET DAILY      How is the patient currently taking it? (ex. 1XDay):unk    Is this a 30 day or 90 day RX:unk    Preferred Pharmacy with phone number:[facility] [phone]    Local or Mail Order:Mail order    Ordering Provider:Jessica Smiley    Would the patient rather a call back or a response via [facility]? CB, if needed.    Best Call Back Number:[phone]    Additional Information: Pt is requesting a refill for his medications.

## 2024-01-22 DIAGNOSIS — E11.9 TYPE 2 DIABETES MELLITUS WITHOUT COMPLICATION, WITH LONG-TERM CURRENT USE OF INSULIN: ICD-10-CM

## 2024-01-22 DIAGNOSIS — Z79.4 TYPE 2 DIABETES MELLITUS WITH HYPERGLYCEMIA, WITH LONG-TERM CURRENT USE OF INSULIN: ICD-10-CM

## 2024-01-22 DIAGNOSIS — Z79.4 TYPE 2 DIABETES MELLITUS WITHOUT COMPLICATION, WITH LONG-TERM CURRENT USE OF INSULIN: ICD-10-CM

## 2024-01-22 DIAGNOSIS — E11.65 TYPE 2 DIABETES MELLITUS WITH HYPERGLYCEMIA, WITH LONG-TERM CURRENT USE OF INSULIN: ICD-10-CM

## 2024-01-22 RX ORDER — GABAPENTIN 300 MG/1
300 CAPSULE ORAL DAILY
Qty: 90 CAPSULE | Refills: 3 | Status: SHIPPED | OUTPATIENT
Start: 2024-01-22 | End: 2024-01-23 | Stop reason: SDUPTHER

## 2024-01-22 RX ORDER — GLIPIZIDE 5 MG/1
5 TABLET, FILM COATED, EXTENDED RELEASE ORAL
Qty: 90 TABLET | Refills: 3 | Status: SHIPPED | OUTPATIENT
Start: 2024-01-22 | End: 2024-01-23 | Stop reason: SDUPTHER

## 2024-01-22 NOTE — TELEPHONE ENCOUNTER
Pt has a new mail pharmacy so he did not get the scripts that were escribed on 01/08/2024. He now uses [facility]. Can the scripts be sent there? Please advise

## 2024-01-23 ENCOUNTER — TELEPHONE (OUTPATIENT)
Dept: FAMILY MEDICINE | Facility: CLINIC | Age: 64
End: 2024-01-23
Payer: MEDICARE

## 2024-01-23 DIAGNOSIS — Z79.4 TYPE 2 DIABETES MELLITUS WITHOUT COMPLICATION, WITH LONG-TERM CURRENT USE OF INSULIN: ICD-10-CM

## 2024-01-23 DIAGNOSIS — E11.65 TYPE 2 DIABETES MELLITUS WITH HYPERGLYCEMIA, WITH LONG-TERM CURRENT USE OF INSULIN: ICD-10-CM

## 2024-01-23 DIAGNOSIS — E11.9 TYPE 2 DIABETES MELLITUS WITHOUT COMPLICATION, WITH LONG-TERM CURRENT USE OF INSULIN: ICD-10-CM

## 2024-01-23 DIAGNOSIS — Z79.4 TYPE 2 DIABETES MELLITUS WITH HYPERGLYCEMIA, WITH LONG-TERM CURRENT USE OF INSULIN: ICD-10-CM

## 2024-01-23 DIAGNOSIS — E78.2 MIXED HYPERLIPIDEMIA: ICD-10-CM

## 2024-01-23 DIAGNOSIS — I10 ESSENTIAL HYPERTENSION: ICD-10-CM

## 2024-01-23 RX ORDER — GLIPIZIDE 5 MG/1
5 TABLET, FILM COATED, EXTENDED RELEASE ORAL
Qty: 7 TABLET | Refills: 0 | Status: SHIPPED | OUTPATIENT
Start: 2024-01-23

## 2024-01-23 RX ORDER — AMLODIPINE AND BENAZEPRIL HYDROCHLORIDE 10; 40 MG/1; MG/1
1 CAPSULE ORAL DAILY
Qty: 7 CAPSULE | Refills: 0 | Status: SHIPPED | OUTPATIENT
Start: 2024-01-23

## 2024-01-23 RX ORDER — ATORVASTATIN CALCIUM 10 MG/1
10 TABLET, FILM COATED ORAL DAILY
Qty: 7 TABLET | Refills: 0 | Status: SHIPPED | OUTPATIENT
Start: 2024-01-23

## 2024-01-23 RX ORDER — GABAPENTIN 300 MG/1
300 CAPSULE ORAL DAILY
Qty: 7 CAPSULE | Refills: 0 | Status: SHIPPED | OUTPATIENT
Start: 2024-01-23

## 2024-01-23 NOTE — TELEPHONE ENCOUNTER
----- Message from Patricia Aguirre sent at 1/22/2024  3:17 PM CST -----  .Type:  Patient Returning Call    Who Called:Sergo  Who Left Message for Patient:PT  Does the patient know what this is regarding?:Medications  Would the patient rather a call back or a response via [facility]? [place]  Best Call Back Number:[phone]  Additional Information: Please send a 7 day supply for the following patient is completely out and will not get in mail order just yet:    amLODIPine-benazepriL (LOTREL)   atorvastatin (LIPITOR)   glipiZIDE  gabapentin (NEURONTIN)     [facility] [address]

## 2024-01-30 LAB
LEFT EYE DM RETINOPATHY: NEGATIVE
RIGHT EYE DM RETINOPATHY: NEGATIVE

## 2024-02-01 PROCEDURE — 82043 UR ALBUMIN QUANTITATIVE: CPT | Performed by: FAMILY MEDICINE

## 2024-02-01 PROCEDURE — 83036 HEMOGLOBIN GLYCOSYLATED A1C: CPT | Performed by: FAMILY MEDICINE

## 2024-02-01 PROCEDURE — 80053 COMPREHEN METABOLIC PANEL: CPT | Performed by: FAMILY MEDICINE

## 2024-02-06 ENCOUNTER — TELEPHONE (OUTPATIENT)
Dept: FAMILY MEDICINE | Facility: CLINIC | Age: 64
End: 2024-02-06

## 2024-02-06 ENCOUNTER — PATIENT OUTREACH (OUTPATIENT)
Dept: ADMINISTRATIVE | Facility: HOSPITAL | Age: 64
End: 2024-02-06
Payer: MEDICARE

## 2024-02-06 ENCOUNTER — OFFICE VISIT (OUTPATIENT)
Dept: FAMILY MEDICINE | Facility: CLINIC | Age: 64
End: 2024-02-06
Payer: MEDICARE

## 2024-02-06 VITALS
DIASTOLIC BLOOD PRESSURE: 75 MMHG | HEART RATE: 87 BPM | RESPIRATION RATE: 18 BRPM | WEIGHT: 233.13 LBS | HEIGHT: 69 IN | BODY MASS INDEX: 34.53 KG/M2 | SYSTOLIC BLOOD PRESSURE: 112 MMHG | TEMPERATURE: 98 F | OXYGEN SATURATION: 98 %

## 2024-02-06 DIAGNOSIS — I10 ESSENTIAL HYPERTENSION: Chronic | ICD-10-CM

## 2024-02-06 DIAGNOSIS — Z79.4 TYPE 2 DIABETES MELLITUS WITHOUT COMPLICATION, WITH LONG-TERM CURRENT USE OF INSULIN: Primary | Chronic | ICD-10-CM

## 2024-02-06 DIAGNOSIS — E78.2 MIXED HYPERLIPIDEMIA: Chronic | ICD-10-CM

## 2024-02-06 DIAGNOSIS — R80.9 MICROALBUMINURIA DUE TO TYPE 2 DIABETES MELLITUS: ICD-10-CM

## 2024-02-06 DIAGNOSIS — E11.9 TYPE 2 DIABETES MELLITUS WITHOUT COMPLICATION, WITH LONG-TERM CURRENT USE OF INSULIN: Primary | Chronic | ICD-10-CM

## 2024-02-06 DIAGNOSIS — E11.29 MICROALBUMINURIA DUE TO TYPE 2 DIABETES MELLITUS: ICD-10-CM

## 2024-02-06 PROCEDURE — 3066F NEPHROPATHY DOC TX: CPT | Mod: CPTII,,, | Performed by: FAMILY MEDICINE

## 2024-02-06 PROCEDURE — 1126F AMNT PAIN NOTED NONE PRSNT: CPT | Mod: CPTII,,, | Performed by: FAMILY MEDICINE

## 2024-02-06 PROCEDURE — 1159F MED LIST DOCD IN RCRD: CPT | Mod: CPTII,,, | Performed by: FAMILY MEDICINE

## 2024-02-06 PROCEDURE — 3060F POS MICROALBUMINURIA REV: CPT | Mod: CPTII,,, | Performed by: FAMILY MEDICINE

## 2024-02-06 PROCEDURE — 3008F BODY MASS INDEX DOCD: CPT | Mod: CPTII,,, | Performed by: FAMILY MEDICINE

## 2024-02-06 PROCEDURE — 1101F PT FALLS ASSESS-DOCD LE1/YR: CPT | Mod: CPTII,,, | Performed by: FAMILY MEDICINE

## 2024-02-06 PROCEDURE — 3078F DIAST BP <80 MM HG: CPT | Mod: CPTII,,, | Performed by: FAMILY MEDICINE

## 2024-02-06 PROCEDURE — 3074F SYST BP LT 130 MM HG: CPT | Mod: CPTII,,, | Performed by: FAMILY MEDICINE

## 2024-02-06 PROCEDURE — 3288F FALL RISK ASSESSMENT DOCD: CPT | Mod: CPTII,,, | Performed by: FAMILY MEDICINE

## 2024-02-06 PROCEDURE — 4010F ACE/ARB THERAPY RXD/TAKEN: CPT | Mod: CPTII,,, | Performed by: FAMILY MEDICINE

## 2024-02-06 PROCEDURE — 3044F HG A1C LEVEL LT 7.0%: CPT | Mod: CPTII,,, | Performed by: FAMILY MEDICINE

## 2024-02-06 PROCEDURE — 99214 OFFICE O/P EST MOD 30 MIN: CPT | Mod: ,,, | Performed by: FAMILY MEDICINE

## 2024-02-06 PROCEDURE — 1160F RVW MEDS BY RX/DR IN RCRD: CPT | Mod: CPTII,,, | Performed by: FAMILY MEDICINE

## 2024-02-06 NOTE — LETTER
"  This communication is flagged as high priority.        AUTHORIZATION FOR RELEASE OF   CONFIDENTIAL INFORMATION    Dear Staff,    We are seeing Sergo Hollingsworth, date of birth 1960, in the clinic at [facility]. Jessica Puentes MD is the patient's PCP. Sergo Hollingsworth has an outstanding lab/procedure at the time we reviewed his chart. In order to help keep his health information updated, he has authorized us to request the following medical record(s):        (  )  MAMMOGRAM                                      (  )  COLONOSCOPY      (  )  PAP SMEAR                                          (  )  OUTSIDE LAB RESULTS     (  )  DEXA SCAN                                          (xx  ) DM  EYE EXAM            (  )  FOOT EXAM                                          (  )  ENTIRE RECORD     (  )  OUTSIDE IMMUNIZATIONS                 (  )  _______________         Please fax records to [facility], Bienvenu-Oubre, Shauna, MD,  [phone]  Attn: Chanelle      If you have any questions, please contact Tk Sam" [facility] Coordinator @ [phone]    Patient Name: Sergo Hollingsworth  : 1960  Patient Phone #: [phone]     " Dupixent Pregnancy And Lactation Text: This medication likely crosses the placenta but the risk for the fetus is uncertain. This medication is excreted in breast milk.

## 2024-02-06 NOTE — PROGRESS NOTES
Sergo Hollingsworth  02/06/2024  [id number]    Jessica Puentes MD  Patient Care Team:  Jessica Puentes MD as PCP - General (Family Medicine)  Hilda Riley MD as Consulting Physician (Family Medicine)  Maricarmen Blandon MD (Cardiovascular Disease)  Inés Blanchard LPN (Inactive) as Licensed Practical Nurse  Mickey Kahn MD as Consulting Physician (Gastroenterology)  Keila Alex MD (Inactive) as Consulting Physician (Ophthalmology)  LU Velázquez OD as Consulting Physician (Optometry)  Emily Pate LPN as Care Coordinator (Family Medicine)      Chief Complaint:  Chief Complaint   Patient presents with    Follow-up     3 month f/u for diabetes with lab results       History of Present Illness:    63 y.o. male who presents today for diabetes f/u with labs.Labs reviewed by me and were discussed with patient. All questions regarding lab results were answered.       Review of Systems  General: denies f/c, weight loss, night sweats, decreased appetite  Eye: denies blurred vision, changes in vision  Respiratory: denies sob, wheezing, cough  Cardiovascular: denies chest pain, palpitations, edema  Gastrointestinal: denies abdominal pain, n/v, constipation, diarrhea  Integumentary: denies rashes, pruritis    Past Medical History  Past Medical History:   Diagnosis Date    Arthritis     Diabetes mellitus, type 2     Hyperlipidemia     Hypertension        Medications  Medication List with Changes/Refills   Current Medications    AMLODIPINE-BENAZEPRIL (LOTREL) 10-40 MG PER CAPSULE    Take 1 capsule by mouth Daily.    ASPIRIN (ECOTRIN) 81 MG EC TABLET    Take 1 tablet (81 mg total) by mouth once daily.    ATORVASTATIN (LIPITOR) 10 MG TABLET    Take 1 tablet (10 mg total) by mouth once daily.    BLOOD SUGAR DIAGNOSTIC STRP    To check BG 1 times daily, to use with insurance preferred meter    BLOOD-GLUCOSE METER KIT    To check BG 1 times daily, to use with insurance preferred  "meter    GABAPENTIN (NEURONTIN) 300 MG CAPSULE    Take 1 capsule (300 mg total) by mouth Daily.    GLIPIZIDE 5 MG TR24    Take 1 tablet (5 mg total) by mouth daily with breakfast.    INSULIN (LANTUS SOLOSTAR U-100 INSULIN) GLARGINE 100 UNITS/ML (3ML) SUBQ PEN    Inject 20 Units into the skin every evening.    LANCETS MISC    To check BG 1 times daily, to use with insurance preferred meter    METFORMIN (GLUCOPHAGE-XR) 500 MG ER 24HR TABLET    Take 2,000 mg by mouth nightly.    PEN NEEDLE, DIABETIC (PEN NEEDLE) 30 GAUGE X 5/16" NDLE    Use once a day with insulin       Past Surgical History:   Procedure Laterality Date    COLONOSCOPY N/A 6/9/2017    Procedure: COLONOSCOPY;  Surgeon: Mickey Kahn MD;  Location: [facility];  Service: Endoscopy;  Laterality: N/A;    KNEE SURGERY  2010    right knee to repair a torn meniscus       SUBJECTIVE:  Health Maintenance  Health Maintenance Topics with due status: Not Due       Topic Last Completion Date    TETANUS VACCINE 04/28/2017    Colorectal Cancer Screening 06/09/2017    Lipid Panel 06/29/2023    Foot Exam 10/30/2023    Diabetes Urine Screening 02/01/2024    Hemoglobin A1c 02/01/2024    Low Dose Statin 02/06/2024     Health Maintenance Due   Topic Date Due    RSV Vaccine (Age 60+ and Pregnant patients) (1 - 1-dose 60+ series) Never done    Abdominal Aortic Aneurysm Screening  Never done    COVID-19 Vaccine (6 - 2023-24 season) 09/01/2023    Eye Exam  12/05/2023       Exam:  Vitals:    02/06/24 0836   BP: 112/75   BP Location: Right arm   Patient Position: Sitting   BP Method: Large (Automatic)   Pulse: 87   Resp: 18   Temp: 97.7 °F (36.5 °C)   TempSrc: Oral   SpO2: 98%   Weight: 105.7 kg (233 lb 1.6 oz)   Height: 5' 9" (1.753 m)     Weight: 105.7 kg (233 lb 1.6 oz)   Body mass index is 34.42 kg/m².      Physical Exam  Constitutional: NAD, alert, pleasant  Respiratory: CTAB, no wheezes, rales or rhonchi. No accessory muscle use  Eyes: EOMI  Cardiovascular: RRR, No " m/r/g. No JVD. No LE edema  Gastrointestinal: BS+, nontender, nondistended  Integumentary: warm, dry, intact  Psych: AA&Ox3      ICD-10-CM ICD-9-CM   1. Type 2 diabetes mellitus without complication, with long-term current use of insulin  E11.9 250.00    Z79.4 V58.67   2. Essential hypertension  I10 401.9   3. Mixed hyperlipidemia  E78.2 272.2   4. Microalbuminuria due to type 2 diabetes mellitus  E11.29 250.40    R80.9 791.0       1. Type 2 diabetes mellitus without complication, with long-term current use of insulin  Overview:  a1c at goal, but up from 6.0 to 6.4 with mild microalbuminuria. Was out of glipizide for       Adhere to a low carb/sugar diet that is also low in fat, but high in protein. Eat foods such as baked chicken, turkey, low fat ground beef, fish. Increase portions of vegetables. Avoid soft drinks, sweet drinks and stick to mainly water.   Discussed diabetes diagnosis and associated risk factors in detail with patient. All questions were answered  We discussed dietary changes and weight loss extensively  Discussed need for yearly eye and foot exams  I recommend checking fasting sugars daily and bring log to every visit. Call me if sugars start increasing even if it is months before your next visit  Keep a snack on you at all times for in case your sugar drops too low          rtc 3 mts with labs    Orders:  -     Hemoglobin A1C; Future; Expected date: 05/06/2024  -     Lipid Panel; Future; Expected date: 05/06/2024  -     Comprehensive Metabolic Panel; Future; Expected date: 05/06/2024  -     Microalbumin/Creatinine Ratio, Urine; Future; Expected date: 05/06/2024    2. Essential hypertension  Overview:  At goan on current meds. + le edema.     Continue current Rx meds        3. Mixed hyperlipidemia  Overview:  ldl at goal on statin.     Continue current Rx meds for now      Orders:  -     Lipid Panel; Future; Expected date: 05/06/2024  -     Comprehensive Metabolic Panel; Future; Expected date:  05/06/2024    4. Microalbuminuria due to type 2 diabetes mellitus  Overview:  Borderline high. Will monitor for now    Orders:  -     Microalbumin/Creatinine Ratio, Urine; Future; Expected date: 05/06/2024         Follow up: Follow up for 3 mts diabetes with labs.      Care Plan/Goals: Reviewed   Goals    None

## 2024-04-25 ENCOUNTER — TELEPHONE (OUTPATIENT)
Dept: FAMILY MEDICINE | Facility: CLINIC | Age: 64
End: 2024-04-25
Payer: MEDICARE

## 2024-04-25 DIAGNOSIS — E11.9 TYPE 2 DIABETES MELLITUS WITHOUT COMPLICATION, WITH LONG-TERM CURRENT USE OF INSULIN: Primary | Chronic | ICD-10-CM

## 2024-04-25 DIAGNOSIS — Z79.4 TYPE 2 DIABETES MELLITUS WITHOUT COMPLICATION, WITH LONG-TERM CURRENT USE OF INSULIN: Primary | Chronic | ICD-10-CM

## 2024-04-25 RX ORDER — INSULIN GLARGINE 100 [IU]/ML
20 INJECTION, SOLUTION SUBCUTANEOUS DAILY
Qty: 6 ML | Refills: 11 | Status: SHIPPED | OUTPATIENT
Start: 2024-04-25 | End: 2025-04-25

## 2024-04-25 NOTE — TELEPHONE ENCOUNTER
I sent in basaglar and put 20 units daily but he can take the same amount of units he is currently taking with lantus

## 2024-04-25 NOTE — TELEPHONE ENCOUNTER
----- Message from Haley Candelaria sent at 4/25/2024  1:03 PM CDT -----  Type:  RX Refill Request    Who Called: pt   Refill or New Rx:refill   RX Name and Strength:insulin (LANTUS SOLOSTAR U-100 INSULIN) glargine 100 units/mL (3mL) SubQ pen  Preferred Pharmacy with phone number:[facility] - [address]        Best Call Back Number: [phone]  Additional Information: Lantus isnt covered by current insurance , pt would like alternate to be sent to pharmacy . Please call to follow up . Pt is down to last pen .

## 2024-04-25 NOTE — TELEPHONE ENCOUNTER
Lantus no longer covered by pts insurance. He would like an alternative sent to the pharmacy. Please advise

## 2024-04-26 DIAGNOSIS — Z79.4 TYPE 2 DIABETES MELLITUS WITHOUT COMPLICATION, WITH LONG-TERM CURRENT USE OF INSULIN: Primary | ICD-10-CM

## 2024-04-26 DIAGNOSIS — E11.9 TYPE 2 DIABETES MELLITUS WITHOUT COMPLICATION, WITH LONG-TERM CURRENT USE OF INSULIN: Primary | ICD-10-CM

## 2024-04-26 RX ORDER — METFORMIN HYDROCHLORIDE 500 MG/1
2000 TABLET, EXTENDED RELEASE ORAL NIGHTLY
Qty: 360 TABLET | Refills: 3 | Status: SHIPPED | OUTPATIENT
Start: 2024-04-26

## 2024-05-06 ENCOUNTER — LAB VISIT (OUTPATIENT)
Dept: LAB | Facility: HOSPITAL | Age: 64
End: 2024-05-06
Attending: FAMILY MEDICINE
Payer: MEDICARE

## 2024-05-06 DIAGNOSIS — Z79.4 TYPE 2 DIABETES MELLITUS WITHOUT COMPLICATION, WITH LONG-TERM CURRENT USE OF INSULIN: Chronic | ICD-10-CM

## 2024-05-06 DIAGNOSIS — R80.9 MICROALBUMINURIA DUE TO TYPE 2 DIABETES MELLITUS: ICD-10-CM

## 2024-05-06 DIAGNOSIS — E11.9 TYPE 2 DIABETES MELLITUS WITHOUT COMPLICATION, WITH LONG-TERM CURRENT USE OF INSULIN: Chronic | ICD-10-CM

## 2024-05-06 DIAGNOSIS — E78.2 MIXED HYPERLIPIDEMIA: Chronic | ICD-10-CM

## 2024-05-06 DIAGNOSIS — E11.29 MICROALBUMINURIA DUE TO TYPE 2 DIABETES MELLITUS: ICD-10-CM

## 2024-05-06 LAB
ALBUMIN SERPL-MCNC: 4.2 G/DL (ref 3.4–4.8)
ALBUMIN/GLOB SERPL: 1.4 RATIO (ref 1.1–2)
ALP SERPL-CCNC: 65 UNIT/L (ref 40–150)
ALT SERPL-CCNC: 26 UNIT/L (ref 0–55)
AST SERPL-CCNC: 26 UNIT/L (ref 5–34)
BILIRUB SERPL-MCNC: 0.9 MG/DL
BUN SERPL-MCNC: 16.4 MG/DL (ref 8.4–25.7)
CALCIUM SERPL-MCNC: 9.7 MG/DL (ref 8.8–10)
CHLORIDE SERPL-SCNC: 105 MMOL/L (ref 98–107)
CHOLEST SERPL-MCNC: 116 MG/DL
CHOLEST/HDLC SERPL: 3 {RATIO} (ref 0–5)
CO2 SERPL-SCNC: 24 MMOL/L (ref 23–31)
CREAT SERPL-MCNC: 1.1 MG/DL (ref 0.73–1.18)
CREAT UR-MCNC: 235.3 MG/DL (ref 63–166)
EST. AVERAGE GLUCOSE BLD GHB EST-MCNC: 159.9 MG/DL
GFR SERPLBLD CREATININE-BSD FMLA CKD-EPI: >60 MLS/MIN/1.73/M2
GLOBULIN SER-MCNC: 2.9 GM/DL (ref 2.4–3.5)
GLUCOSE SERPL-MCNC: 147 MG/DL (ref 82–115)
HBA1C MFR BLD: 7.2 %
HDLC SERPL-MCNC: 46 MG/DL (ref 35–60)
LDLC SERPL CALC-MCNC: 51 MG/DL (ref 50–140)
MICROALBUMIN UR-MCNC: 22 UG/ML
MICROALBUMIN/CREAT RATIO PNL UR: 9.3 MG/GM CR (ref 0–30)
POTASSIUM SERPL-SCNC: 3.7 MMOL/L (ref 3.5–5.1)
PROT SERPL-MCNC: 7.1 GM/DL (ref 5.8–7.6)
SODIUM SERPL-SCNC: 139 MMOL/L (ref 136–145)
TRIGL SERPL-MCNC: 95 MG/DL (ref 34–140)
VLDLC SERPL CALC-MCNC: 19 MG/DL

## 2024-05-06 PROCEDURE — 83036 HEMOGLOBIN GLYCOSYLATED A1C: CPT

## 2024-05-06 PROCEDURE — 80053 COMPREHEN METABOLIC PANEL: CPT

## 2024-05-06 PROCEDURE — 36415 COLL VENOUS BLD VENIPUNCTURE: CPT

## 2024-05-06 PROCEDURE — 80061 LIPID PANEL: CPT

## 2024-05-06 PROCEDURE — 82043 UR ALBUMIN QUANTITATIVE: CPT

## 2024-05-07 ENCOUNTER — TELEPHONE (OUTPATIENT)
Dept: FAMILY MEDICINE | Facility: CLINIC | Age: 64
End: 2024-05-07

## 2024-05-07 ENCOUNTER — PATIENT OUTREACH (OUTPATIENT)
Dept: ADMINISTRATIVE | Facility: HOSPITAL | Age: 64
End: 2024-05-07
Payer: MEDICARE

## 2024-05-07 ENCOUNTER — OFFICE VISIT (OUTPATIENT)
Dept: FAMILY MEDICINE | Facility: CLINIC | Age: 64
End: 2024-05-07
Payer: MEDICARE

## 2024-05-07 VITALS
RESPIRATION RATE: 18 BRPM | HEIGHT: 69 IN | HEART RATE: 70 BPM | DIASTOLIC BLOOD PRESSURE: 82 MMHG | TEMPERATURE: 98 F | WEIGHT: 233.19 LBS | SYSTOLIC BLOOD PRESSURE: 129 MMHG | BODY MASS INDEX: 34.54 KG/M2

## 2024-05-07 DIAGNOSIS — E66.01 CLASS 2 SEVERE OBESITY WITH SERIOUS COMORBIDITY AND BODY MASS INDEX (BMI) OF 36.0 TO 36.9 IN ADULT, UNSPECIFIED OBESITY TYPE: ICD-10-CM

## 2024-05-07 DIAGNOSIS — E11.59 HYPERTENSION ASSOCIATED WITH DIABETES: ICD-10-CM

## 2024-05-07 DIAGNOSIS — I15.2 HYPERTENSION ASSOCIATED WITH DIABETES: ICD-10-CM

## 2024-05-07 DIAGNOSIS — Z79.4 TYPE 2 DIABETES MELLITUS WITHOUT COMPLICATION, WITH LONG-TERM CURRENT USE OF INSULIN: Primary | Chronic | ICD-10-CM

## 2024-05-07 DIAGNOSIS — M75.101 ROTATOR CUFF SYNDROME OF RIGHT SHOULDER: ICD-10-CM

## 2024-05-07 DIAGNOSIS — E11.9 TYPE 2 DIABETES MELLITUS WITHOUT COMPLICATION, WITH LONG-TERM CURRENT USE OF INSULIN: Primary | Chronic | ICD-10-CM

## 2024-05-07 DIAGNOSIS — E11.69 HYPERLIPIDEMIA ASSOCIATED WITH TYPE 2 DIABETES MELLITUS: ICD-10-CM

## 2024-05-07 DIAGNOSIS — E78.5 HYPERLIPIDEMIA ASSOCIATED WITH TYPE 2 DIABETES MELLITUS: ICD-10-CM

## 2024-05-07 PROCEDURE — 99214 OFFICE O/P EST MOD 30 MIN: CPT | Mod: ,,, | Performed by: FAMILY MEDICINE

## 2024-05-07 PROCEDURE — G2211 COMPLEX E/M VISIT ADD ON: HCPCS | Mod: ,,, | Performed by: FAMILY MEDICINE

## 2024-05-07 NOTE — PROGRESS NOTES
Health Maintenance Topic(s) Outreach Outcomes & Actions Taken:    Eye Exam - Outreach Outcomes & Actions Taken  : Diabetic Eye External Records Uploaded, Care Team & History Updated if Applicable       Additional Notes:  Request Diabetic Eye Exam: Dr. Courtney Alan

## 2024-05-07 NOTE — LETTER
AUTHORIZATION FOR RELEASE OF   CONFIDENTIAL INFORMATION    Dear Dr. Courtney Alan,  Fax: [phone]    We are seeing Sergo Hollingsworth, date of birth 1960, in the clinic at [facility]. Jessica Puentes MD is the patient's PCP. Sergo Hollingsworth has an outstanding lab/procedure at the time we reviewed his chart. In order to help keep his health information updated, he has authorized us to request the following medical record(s):        (  )  MAMMOGRAM                                      (  )  COLONOSCOPY      (  )  PAP SMEAR                                          (  )  OUTSIDE LAB RESULTS     (  )  DEXA SCAN                                          ( X )  EYE EXAM            (  )  FOOT EXAM                                          (  )  ENTIRE RECORD     (  )  OUTSIDE IMMUNIZATIONS                 (  )  _______________         Please fax records to [facility], Bienvenu-Oubre, Shauna, MD, [phone]     If you have any question or concerns. Please call Rogelio MORRISON CCC @ [phone]          Patient Name: Sergo Hollingsworth  : 1960  Patient Phone #: [phone]

## 2024-05-07 NOTE — PROGRESS NOTES
Sergo Hollingsworth  05/07/2024  [id number]    Jessica Puentes MD  Patient Care Team:  Jessica Puentes MD as PCP - General (Family Medicine)  Hilda Riley MD as Consulting Physician (Family Medicine)  Maricarmen Blandon MD (Cardiovascular Disease)  Inés Blanchard LPN (Inactive) as Licensed Practical Nurse  Mickey Kahn MD as Consulting Physician (Gastroenterology)  Keila Alex MD (Inactive) as Consulting Physician (Ophthalmology)  Emily Pate LPN as Care Coordinator (Family Medicine)  Courtney Alan MD as Consulting Physician (Ophthalmology)      Chief Complaint:  Chief Complaint   Patient presents with    [facility] Care     3 month f/u with lab results. Pt is c/o right shoulder pain, worse with certain movements       History of Present Illness:    64 y.o. male who presents today for diabetes f/u with labs. Labs reviewed by me and were discussed with patient. All questions regarding lab results were answered.     Visit today included increased complexity associated with the care of the episodic problem htn, hld, dmii addressed and managing the longitudinal care of the patient due to the serious and/or complex managed problem(s) .      He c/o right shoulder pain x 3 mts. No known injury. Hurts with movements such as reaching behind him, overhead and when sleeping it. No numbness or tingling.      Review of Systems  General: denies f/c, weight loss, night sweats, decreased appetite  Eye: denies blurred vision, changes in vision  Respiratory: denies sob, wheezing, cough  Cardiovascular: denies chest pain, palpitations, edema  Gastrointestinal: denies abdominal pain, n/v, constipation, diarrhea  Integumentary: denies rashes, pruritis    Past Medical History  Past Medical History:   Diagnosis Date    Arthritis     Diabetes mellitus, type 2     Hyperlipidemia     Hypertension        Medications  Medication List with Changes/Refills   Current Medications     "AMLODIPINE-BENAZEPRIL (LOTREL) 10-40 MG PER CAPSULE    Take 1 capsule by mouth Daily.    ATORVASTATIN (LIPITOR) 10 MG TABLET    Take 1 tablet (10 mg total) by mouth once daily.    BLOOD SUGAR DIAGNOSTIC STRP    To check BG 1 times daily, to use with insurance preferred meter    BLOOD-GLUCOSE METER KIT    To check BG 1 times daily, to use with insurance preferred meter    GABAPENTIN (NEURONTIN) 300 MG CAPSULE    Take 1 capsule (300 mg total) by mouth Daily.    GLIPIZIDE 5 MG TR24    Take 1 tablet (5 mg total) by mouth daily with breakfast.    INSULIN (BASAGLAR KWIKPEN U-100 INSULIN) GLARGINE 100 UNITS/ML SUBQ PEN    Inject 20 Units into the skin once daily.    LANCETS MISC    To check BG 1 times daily, to use with insurance preferred meter    METFORMIN (GLUCOPHAGE-XR) 500 MG ER 24HR TABLET    Take 4 tablets (2,000 mg total) by mouth nightly.    PEN NEEDLE, DIABETIC (PEN NEEDLE) 30 GAUGE X 5/16" NDLE    Use once a day with insulin   Discontinued Medications    ASPIRIN (ECOTRIN) 81 MG EC TABLET    Take 1 tablet (81 mg total) by mouth once daily.    INSULIN (LANTUS SOLOSTAR U-100 INSULIN) GLARGINE 100 UNITS/ML (3ML) SUBQ PEN    Inject 20 Units into the skin every evening.    SITAGLIPTIN (JANUVIA) 100 MG TAB    Take 1 tablet (100 mg total) by mouth once daily.    TRIAMTERENE-HYDROCHLOROTHIAZIDE 37.5-25 MG (DYAZIDE) 37.5-25 MG PER CAPSULE    TAKE 1 CAPSULE EVERY MORNING       Past Surgical History:   Procedure Laterality Date    COLONOSCOPY N/A 6/9/2017    Procedure: COLONOSCOPY;  Surgeon: Mickey Kahn MD;  Location: [facility];  Service: Endoscopy;  Laterality: N/A;    KNEE SURGERY  2010    right knee to repair a torn meniscus       SUBJECTIVE:  Health Maintenance  Health Maintenance Topics with due status: Not Due       Topic Last Completion Date    Influenza Vaccine 12/16/2016    TETANUS VACCINE 04/28/2017    Colorectal Cancer Screening 06/09/2017    Foot Exam 10/30/2023    Eye Exam 01/30/2024    Low Dose Statin " "02/06/2024    Diabetes Urine Screening 05/06/2024    Lipid Panel 05/06/2024    Hemoglobin A1c 05/06/2024     Health Maintenance Due   Topic Date Due    RSV Vaccine (Age 60+ and Pregnant patients) (1 - 1-dose 60+ series) Never done    Abdominal Aortic Aneurysm Screening  Never done    COVID-19 Vaccine (7 - 2023-24 season) 09/01/2023       Exam:  Vitals:    05/07/24 0949   BP: 129/82   BP Location: Left arm   Patient Position: Sitting   BP Method: Large (Automatic)   Pulse: 70   Resp: 18   Temp: 98.1 °F (36.7 °C)   TempSrc: Oral   Weight: 105.8 kg (233 lb 3.2 oz)   Height: 5' 9" (1.753 m)     Weight: 105.8 kg (233 lb 3.2 oz)   Body mass index is 34.44 kg/m².      Physical Exam  Constitutional: NAD, alert, pleasant  Respiratory: No accessory muscle use, no cough or audible wheezing  Eyes: EOMI, no conjunctivitis   Integumentary: warm, dry, intact  Psych: AA&Ox3, answers questions appropriately  Msk: + empty can, crossover, painful arc      ICD-10-CM ICD-9-CM   1. Type 2 diabetes mellitus without complication, with long-term current use of insulin  E11.9 250.00    Z79.4 V58.67   2. Hypertension associated with diabetes  E11.59 250.80    I15.2 401.9   3. Class 2 severe obesity with serious comorbidity and body mass index (BMI) of 36.0 to 36.9 in adult, unspecified obesity type  E66.01 278.01    Z68.36 V85.36   4. Rotator cuff syndrome of right shoulder  M75.101 726.10   5. Hyperlipidemia associated with type 2 diabetes mellitus  E11.69 250.80    E78.5 272.4       1. Type 2 diabetes mellitus without complication, with long-term current use of insulin  Overview:  a1c increased from 6.4 to 7.2. with mild microalbuminuria. Patient reports being out of metformin XR due to insurance not wanting to pay. He just restarted it last night. NO concerns or complaints.     Continue current Rx meds  Rtc 3 mts with labs        Orders:  -     Hemoglobin A1C; Future; Expected date: 08/07/2024  -     Basic Metabolic Panel; Future; Expected " date: 08/07/2024    2. Hypertension associated with diabetes  Overview:  At goan on current meds.     Continue current Rx meds        3. Class 2 severe obesity with serious comorbidity and body mass index (BMI) of 36.0 to 36.9 in adult, unspecified obesity type  Overview:  Patient stay active by walking around his [place] outside. Eats a regular diet.     - exercise for 30-45 min a day, 5x a week  - eat a total of 1800 calories daily with less than 70 total carbohydrates daily  - use my fitness pal to log foods and track calories  - eat lean meats like chicken, turkey, fish, lean ground beef. Avoid fried, fatty or processed foods.  - do not eat pasta, bread, rice, potatoes often        4. Rotator cuff syndrome of right shoulder  Comments:  refer to pt and may need mri/ortho referral  Orders:  -     Ambulatory referral/consult to Physical/Occupational Therapy; Future; Expected date: 05/07/2024    5. Hyperlipidemia associated with type 2 diabetes mellitus  Overview:  ldl at goal on statin. Asymptomatic    Continue current Rx meds for now             Follow up: Follow up for 3 mts diabetes with labs.      Care Plan/Goals: Reviewed   Goals    None

## 2024-05-08 NOTE — PROGRESS NOTES
Health Maintenance Topic(s) Outreach Outcomes & Actions Taken:    Eye Exam - Outreach Outcomes & Actions Taken  : Diabetic Eye External Records Uploaded, Care Team & History Updated if Applicable     Additional Notes:  DM eye exam 1/30/2024

## 2024-07-17 ENCOUNTER — TELEPHONE (OUTPATIENT)
Dept: FAMILY MEDICINE | Facility: CLINIC | Age: 64
End: 2024-07-17
Payer: MEDICARE

## 2024-07-17 DIAGNOSIS — E11.9 TYPE 2 DIABETES MELLITUS WITHOUT COMPLICATION, WITH LONG-TERM CURRENT USE OF INSULIN: ICD-10-CM

## 2024-07-17 DIAGNOSIS — Z79.4 TYPE 2 DIABETES MELLITUS WITHOUT COMPLICATION, WITH LONG-TERM CURRENT USE OF INSULIN: ICD-10-CM

## 2024-07-17 RX ORDER — PEN NEEDLE, DIABETIC 30 GX3/16"
NEEDLE, DISPOSABLE MISCELLANEOUS
Qty: 100 EACH | Refills: 3 | Status: SHIPPED | OUTPATIENT
Start: 2024-07-17

## 2024-07-17 NOTE — TELEPHONE ENCOUNTER
Spoke pt. He states that he needs his insulin pen needles (32g X4 mm) sent to [facility] and test strips sent to [email or web address]. Both scripts escribed. Pt was notified.

## 2024-07-17 NOTE — TELEPHONE ENCOUNTER
----- Message from Tabitha Adamson sent at 7/17/2024  9:30 AM CDT -----  Regarding: med  .Type:  Needs Medical Advice    Who Called: pt  Symptoms (please be specific):    How long has patient had these symptoms:    Pharmacy name and phone #:    Would the patient rather a call back or a response via [facility]?   Best Call Back Number:  [phone]  Additional Information: request for insulin pin

## 2024-08-05 ENCOUNTER — LAB VISIT (OUTPATIENT)
Dept: LAB | Facility: HOSPITAL | Age: 64
End: 2024-08-05
Attending: FAMILY MEDICINE
Payer: MEDICARE

## 2024-08-05 DIAGNOSIS — E11.9 TYPE 2 DIABETES MELLITUS WITHOUT COMPLICATION, WITH LONG-TERM CURRENT USE OF INSULIN: Chronic | ICD-10-CM

## 2024-08-05 DIAGNOSIS — Z79.4 TYPE 2 DIABETES MELLITUS WITHOUT COMPLICATION, WITH LONG-TERM CURRENT USE OF INSULIN: Chronic | ICD-10-CM

## 2024-08-05 LAB
ANION GAP SERPL CALC-SCNC: 9 MEQ/L
BUN SERPL-MCNC: 18.7 MG/DL (ref 8.4–25.7)
CALCIUM SERPL-MCNC: 9.4 MG/DL (ref 8.8–10)
CHLORIDE SERPL-SCNC: 106 MMOL/L (ref 98–107)
CO2 SERPL-SCNC: 24 MMOL/L (ref 23–31)
CREAT SERPL-MCNC: 1.27 MG/DL (ref 0.73–1.18)
CREAT/UREA NIT SERPL: 15
EST. AVERAGE GLUCOSE BLD GHB EST-MCNC: 148.5 MG/DL
GFR SERPLBLD CREATININE-BSD FMLA CKD-EPI: >60 ML/MIN/1.73/M2
GLUCOSE SERPL-MCNC: 164 MG/DL (ref 82–115)
HBA1C MFR BLD: 6.8 %
POTASSIUM SERPL-SCNC: 4.6 MMOL/L (ref 3.5–5.1)
SODIUM SERPL-SCNC: 139 MMOL/L (ref 136–145)

## 2024-08-05 PROCEDURE — 83036 HEMOGLOBIN GLYCOSYLATED A1C: CPT

## 2024-08-05 PROCEDURE — 36415 COLL VENOUS BLD VENIPUNCTURE: CPT

## 2024-08-05 PROCEDURE — 80048 BASIC METABOLIC PNL TOTAL CA: CPT

## 2024-08-07 ENCOUNTER — OFFICE VISIT (OUTPATIENT)
Dept: FAMILY MEDICINE | Facility: CLINIC | Age: 64
End: 2024-08-07
Payer: MEDICARE

## 2024-08-07 VITALS
RESPIRATION RATE: 18 BRPM | BODY MASS INDEX: 34.88 KG/M2 | WEIGHT: 235.5 LBS | HEART RATE: 88 BPM | DIASTOLIC BLOOD PRESSURE: 79 MMHG | OXYGEN SATURATION: 97 % | HEIGHT: 69 IN | SYSTOLIC BLOOD PRESSURE: 137 MMHG

## 2024-08-07 DIAGNOSIS — E11.59 HYPERTENSION ASSOCIATED WITH DIABETES: Primary | ICD-10-CM

## 2024-08-07 DIAGNOSIS — E11.9 TYPE 2 DIABETES MELLITUS WITHOUT COMPLICATION, WITH LONG-TERM CURRENT USE OF INSULIN: Chronic | ICD-10-CM

## 2024-08-07 DIAGNOSIS — Z13.6 ENCOUNTER FOR ABDOMINAL AORTIC ANEURYSM (AAA) SCREENING: ICD-10-CM

## 2024-08-07 DIAGNOSIS — Z79.4 TYPE 2 DIABETES MELLITUS WITHOUT COMPLICATION, WITH LONG-TERM CURRENT USE OF INSULIN: Chronic | ICD-10-CM

## 2024-08-07 DIAGNOSIS — I15.2 HYPERTENSION ASSOCIATED WITH DIABETES: Primary | ICD-10-CM

## 2024-08-07 PROCEDURE — 99214 OFFICE O/P EST MOD 30 MIN: CPT | Mod: ,,, | Performed by: FAMILY MEDICINE

## 2024-08-22 ENCOUNTER — HOSPITAL ENCOUNTER (OUTPATIENT)
Dept: RADIOLOGY | Facility: HOSPITAL | Age: 64
Discharge: HOME | End: 2024-08-22
Attending: FAMILY MEDICINE
Payer: MEDICARE

## 2024-08-22 DIAGNOSIS — Z13.6 ENCOUNTER FOR ABDOMINAL AORTIC ANEURYSM (AAA) SCREENING: ICD-10-CM

## 2024-08-22 PROCEDURE — 76775 US EXAM ABDO BACK WALL LIM: CPT | Mod: TC

## 2024-10-14 ENCOUNTER — PATIENT MESSAGE (OUTPATIENT)
Dept: FAMILY MEDICINE | Facility: CLINIC | Age: 64
End: 2024-10-14
Payer: MEDICARE

## 2024-10-15 ENCOUNTER — TELEPHONE (OUTPATIENT)
Dept: FAMILY MEDICINE | Facility: CLINIC | Age: 64
End: 2024-10-15
Payer: MEDICARE

## 2024-10-15 NOTE — TELEPHONE ENCOUNTER
Please have patient come in with blood pressure cuff for an appt to monitor bp. His pressure has been normal.

## 2024-10-21 ENCOUNTER — OFFICE VISIT (OUTPATIENT)
Dept: FAMILY MEDICINE | Facility: CLINIC | Age: 64
End: 2024-10-21
Payer: MEDICARE

## 2024-10-21 VITALS
HEIGHT: 69 IN | OXYGEN SATURATION: 96 % | TEMPERATURE: 98 F | BODY MASS INDEX: 35.45 KG/M2 | WEIGHT: 239.38 LBS | RESPIRATION RATE: 12 BRPM | DIASTOLIC BLOOD PRESSURE: 78 MMHG | SYSTOLIC BLOOD PRESSURE: 136 MMHG | HEART RATE: 83 BPM

## 2024-10-21 DIAGNOSIS — I15.2 HYPERTENSION ASSOCIATED WITH DIABETES: Primary | ICD-10-CM

## 2024-10-21 DIAGNOSIS — E11.59 HYPERTENSION ASSOCIATED WITH DIABETES: Primary | ICD-10-CM

## 2024-10-21 PROCEDURE — G2211 COMPLEX E/M VISIT ADD ON: HCPCS | Mod: ,,, | Performed by: FAMILY MEDICINE

## 2024-10-21 PROCEDURE — 99214 OFFICE O/P EST MOD 30 MIN: CPT | Mod: ,,, | Performed by: FAMILY MEDICINE

## 2024-10-21 NOTE — PROGRESS NOTES
Sergo Hollingsworth  10/21/2024  [id number]    Jessica Puentes MD  Patient Care Team:  Jessica Puentes MD as PCP - General (Family Medicine)  Hilda Riley MD as Consulting Physician (Family Medicine)  Maricarmen Blandon MD (Cardiovascular Disease)  Inés Blanchard LPN (Inactive) as Licensed Practical Nurse  Mickey Kahn MD as Consulting Physician (Gastroenterology)  Keila Alex MD (Inactive) as Consulting Physician (Ophthalmology)  Emiyl Pate LPN as Care Coordinator (Family Medicine)  Courtney Alan MD as Consulting Physician (Ophthalmology)      Chief Complaint:  Chief Complaint   Patient presents with    Hypertension     2 mth f/u htn       History of Present Illness:    64 y.o. male who presents today for htn f/u as patient has been getting elevated bp readings at home, but they are always normal in the office. However, today's bp reading is high. HE just took meds 20 min ago. Patient did bring in his cuff and it is too small.      Visit today included increased complexity associated with the care of the episodic problem htn addressed and managing the longitudinal care of the patient due to the serious and/or complex managed problem(s) .      Review of Systems  General: denies f/c, weight loss, night sweats, decreased appetite  Eye: denies blurred vision, changes in vision  Respiratory: denies sob, wheezing, cough  Cardiovascular: denies chest pain, palpitations, edema  Gastrointestinal: denies abdominal pain, n/v, constipation, diarrhea  Integumentary: denies rashes, pruritis    Past Medical History  Past Medical History:   Diagnosis Date    Arthritis     Diabetes mellitus, type 2     Hyperlipidemia     Hypertension        Medications  Medication List with Changes/Refills   Current Medications    AMLODIPINE-BENAZEPRIL (LOTREL) 10-40 MG PER CAPSULE    Take 1 capsule by mouth Daily.    ATORVASTATIN (LIPITOR) 10 MG TABLET    Take 1 tablet (10 mg total) by mouth  "once daily.    BLOOD SUGAR DIAGNOSTIC STRP    To check BG 1 times daily, to use with insurance preferred meter    BLOOD-GLUCOSE METER KIT    To check BG 1 times daily, to use with insurance preferred meter    GABAPENTIN (NEURONTIN) 300 MG CAPSULE    Take 1 capsule (300 mg total) by mouth Daily.    GLIPIZIDE 5 MG TR24    Take 1 tablet (5 mg total) by mouth daily with breakfast.    INSULIN (BASAGLAR KWIKPEN U-100 INSULIN) GLARGINE 100 UNITS/ML SUBQ PEN    Inject 20 Units into the skin once daily.    LANCETS MISC    To check BG 1 times daily, to use with insurance preferred meter    METFORMIN (GLUCOPHAGE-XR) 500 MG ER 24HR TABLET    Take 4 tablets (2,000 mg total) by mouth nightly.    PEN NEEDLE, DIABETIC (PEN NEEDLE) 32 GAUGE X 5/32" NDLE    To use once a day with insulin pen       Past Surgical History:   Procedure Laterality Date    COLONOSCOPY N/A 06/09/2017    Procedure: COLONOSCOPY;  Surgeon: Mickey Kahn MD;  Location: [facility];  Service: Endoscopy;  Laterality: N/A;    KNEE SURGERY  2010    right knee to repair a torn meniscus       SUBJECTIVE:  Health Maintenance  Health Maintenance Topics with due status: Not Due       Topic Last Completion Date    TETANUS VACCINE 04/28/2017    Colorectal Cancer Screening 06/09/2017    Eye Exam 01/30/2024    Diabetes Urine Screening 05/06/2024    Lipid Panel 05/06/2024    Hemoglobin A1c 08/05/2024    Low Dose Statin 10/21/2024     Health Maintenance Due   Topic Date Due    RSV Vaccine (Age 60+ and Pregnant patients) (1 - Risk 60-74 years 1-dose series) Never done    COVID-19 Vaccine (7 - 2024-25 season) 09/01/2024    Foot Exam  10/30/2024       Exam:  Vitals:    10/21/24 0734 10/21/24 0741 10/21/24 0806   BP: (!) 158/87 (!) 144/86 136/78   BP Location: Right arm Right arm Right arm   Patient Position: Sitting Sitting Sitting   Pulse: 83     Resp: 12     Temp: 98.1 °F (36.7 °C)     TempSrc: Temporal     SpO2: 96%     Weight: 108.6 kg (239 lb 6.4 oz)     Height: 5' 9" " (1.753 m)       Weight: 108.6 kg (239 lb 6.4 oz)   Body mass index is 35.35 kg/m².      Physical Exam  Constitutional: NAD, alert, pleasant  Respiratory: CTAB, no wheezes, rales or rhonchi. No accessory muscle use  Eyes: EOMI  Cardiovascular: RRR, No m/r/g. No JVD. No LE edema  Gastrointestinal: BS+, nontender, nondistended  Integumentary: warm, dry, intact  Psych: AA&Ox3      ICD-10-CM ICD-9-CM   1. Hypertension associated with diabetes  E11.59 250.80    I15.2 401.9       1. Hypertension associated with diabetes  Overview:  At goan on lotrel. Asymptomatic    Continue current Rx meds           Repeat bp in office is normal. Patient's bp cuff is too small. Advised to get an adult larg and call me if pressures continue to be elevated    Follow up: No follow-ups on file.      Care Plan/Goals: Reviewed   Goals    None

## 2024-11-06 ENCOUNTER — LAB VISIT (OUTPATIENT)
Dept: LAB | Facility: HOSPITAL | Age: 64
End: 2024-11-06
Attending: FAMILY MEDICINE
Payer: MEDICARE

## 2024-11-06 DIAGNOSIS — Z79.4 TYPE 2 DIABETES MELLITUS WITHOUT COMPLICATION, WITH LONG-TERM CURRENT USE OF INSULIN: ICD-10-CM

## 2024-11-06 DIAGNOSIS — E11.59 HYPERTENSION ASSOCIATED WITH DIABETES: ICD-10-CM

## 2024-11-06 DIAGNOSIS — Z12.5 SCREENING PSA (PROSTATE SPECIFIC ANTIGEN): ICD-10-CM

## 2024-11-06 DIAGNOSIS — I10 ESSENTIAL HYPERTENSION: ICD-10-CM

## 2024-11-06 DIAGNOSIS — E11.9 TYPE 2 DIABETES MELLITUS WITHOUT COMPLICATION, WITH LONG-TERM CURRENT USE OF INSULIN: ICD-10-CM

## 2024-11-06 DIAGNOSIS — E78.2 MIXED HYPERLIPIDEMIA: ICD-10-CM

## 2024-11-06 DIAGNOSIS — I15.2 HYPERTENSION ASSOCIATED WITH DIABETES: ICD-10-CM

## 2024-11-06 LAB
ALBUMIN SERPL-MCNC: 3.9 G/DL (ref 3.4–4.8)
ALBUMIN/GLOB SERPL: 1.3 RATIO (ref 1.1–2)
ALP SERPL-CCNC: 62 UNIT/L (ref 40–150)
ALT SERPL-CCNC: 31 UNIT/L (ref 0–55)
ANION GAP SERPL CALC-SCNC: 8 MEQ/L
AST SERPL-CCNC: 32 UNIT/L (ref 5–34)
BACTERIA #/AREA URNS AUTO: ABNORMAL /HPF
BASOPHILS # BLD AUTO: 0.07 X10(3)/MCL
BASOPHILS NFR BLD AUTO: 1 %
BILIRUB SERPL-MCNC: 0.5 MG/DL
BILIRUB UR QL STRIP.AUTO: NEGATIVE
BUN SERPL-MCNC: 16.2 MG/DL (ref 8.4–25.7)
CALCIUM SERPL-MCNC: 8.8 MG/DL (ref 8.8–10)
CHLORIDE SERPL-SCNC: 106 MMOL/L (ref 98–107)
CHOLEST SERPL-MCNC: 123 MG/DL
CHOLEST/HDLC SERPL: 3 {RATIO} (ref 0–5)
CLARITY UR: CLEAR
CO2 SERPL-SCNC: 25 MMOL/L (ref 23–31)
COLOR UR AUTO: ABNORMAL
CREAT SERPL-MCNC: 1.27 MG/DL (ref 0.72–1.25)
CREAT UR-MCNC: 80 MG/DL (ref 63–166)
CREAT/UREA NIT SERPL: 13
EOSINOPHIL # BLD AUTO: 0.2 X10(3)/MCL (ref 0–0.9)
EOSINOPHIL NFR BLD AUTO: 2.7 %
EPI CELLS #/AREA URNS HPF: ABNORMAL /HPF
ERYTHROCYTE [DISTWIDTH] IN BLOOD BY AUTOMATED COUNT: 12.9 % (ref 11.5–17)
EST. AVERAGE GLUCOSE BLD GHB EST-MCNC: 142.7 MG/DL
GFR SERPLBLD CREATININE-BSD FMLA CKD-EPI: >60 ML/MIN/1.73/M2
GLOBULIN SER-MCNC: 3 GM/DL (ref 2.4–3.5)
GLUCOSE SERPL-MCNC: 137 MG/DL (ref 82–115)
GLUCOSE UR QL STRIP: NORMAL
HBA1C MFR BLD: 6.6 %
HCT VFR BLD AUTO: 37.8 % (ref 42–52)
HDLC SERPL-MCNC: 39 MG/DL (ref 35–60)
HGB BLD-MCNC: 12.8 G/DL (ref 14–18)
HGB UR QL STRIP: ABNORMAL
IMM GRANULOCYTES # BLD AUTO: 0.03 X10(3)/MCL (ref 0–0.04)
IMM GRANULOCYTES NFR BLD AUTO: 0.4 %
KETONES UR QL STRIP: NEGATIVE
LDLC SERPL CALC-MCNC: 65 MG/DL (ref 50–140)
LEUKOCYTE ESTERASE UR QL STRIP: NEGATIVE
LYMPHOCYTES # BLD AUTO: 2.05 X10(3)/MCL (ref 0.6–4.6)
LYMPHOCYTES NFR BLD AUTO: 27.9 %
MCH RBC QN AUTO: 30.3 PG (ref 27–31)
MCHC RBC AUTO-ENTMCNC: 33.9 G/DL (ref 33–36)
MCV RBC AUTO: 89.6 FL (ref 80–94)
MICROALBUMIN UR-MCNC: 10.8 UG/ML
MICROALBUMIN/CREAT RATIO PNL UR: 13.5 MG/GM CR (ref 0–30)
MONOCYTES # BLD AUTO: 0.84 X10(3)/MCL (ref 0.1–1.3)
MONOCYTES NFR BLD AUTO: 11.4 %
NEUTROPHILS # BLD AUTO: 4.17 X10(3)/MCL (ref 2.1–9.2)
NEUTROPHILS NFR BLD AUTO: 56.6 %
NITRITE UR QL STRIP: NEGATIVE
NRBC BLD AUTO-RTO: 0 %
PH UR STRIP: 6 [PH]
PLATELET # BLD AUTO: 290 X10(3)/MCL (ref 130–400)
PMV BLD AUTO: 10 FL (ref 7.4–10.4)
POTASSIUM SERPL-SCNC: 4 MMOL/L (ref 3.5–5.1)
PROT SERPL-MCNC: 6.9 GM/DL (ref 5.8–7.6)
PROT UR QL STRIP: NEGATIVE
PSA SERPL-MCNC: 0.29 NG/ML
RBC # BLD AUTO: 4.22 X10(6)/MCL (ref 4.7–6.1)
RBC #/AREA URNS AUTO: ABNORMAL /HPF
SODIUM SERPL-SCNC: 139 MMOL/L (ref 136–145)
SP GR UR STRIP.AUTO: 1.01 (ref 1–1.03)
SQUAMOUS #/AREA URNS LPF: ABNORMAL /HPF
TRIGL SERPL-MCNC: 94 MG/DL (ref 34–140)
TSH SERPL-ACNC: 3.24 UIU/ML (ref 0.35–4.94)
UROBILINOGEN UR STRIP-ACNC: NORMAL
VLDLC SERPL CALC-MCNC: 19 MG/DL
WBC # BLD AUTO: 7.36 X10(3)/MCL (ref 4.5–11.5)
WBC #/AREA URNS AUTO: ABNORMAL /HPF

## 2024-11-06 PROCEDURE — 36415 COLL VENOUS BLD VENIPUNCTURE: CPT

## 2024-11-06 PROCEDURE — 84153 ASSAY OF PSA TOTAL: CPT

## 2024-11-06 PROCEDURE — 81001 URINALYSIS AUTO W/SCOPE: CPT

## 2024-11-06 PROCEDURE — 80053 COMPREHEN METABOLIC PANEL: CPT

## 2024-11-06 PROCEDURE — 82570 ASSAY OF URINE CREATININE: CPT

## 2024-11-06 PROCEDURE — 84443 ASSAY THYROID STIM HORMONE: CPT

## 2024-11-06 PROCEDURE — 83036 HEMOGLOBIN GLYCOSYLATED A1C: CPT

## 2024-11-06 PROCEDURE — 85025 COMPLETE CBC W/AUTO DIFF WBC: CPT

## 2024-11-06 PROCEDURE — 80061 LIPID PANEL: CPT

## 2024-11-08 ENCOUNTER — TELEPHONE (OUTPATIENT)
Dept: FAMILY MEDICINE | Facility: CLINIC | Age: 64
End: 2024-11-08
Payer: MEDICARE

## 2024-11-12 ENCOUNTER — OFFICE VISIT (OUTPATIENT)
Dept: FAMILY MEDICINE | Facility: CLINIC | Age: 64
End: 2024-11-12
Payer: MEDICARE

## 2024-11-12 VITALS
BODY MASS INDEX: 35.2 KG/M2 | SYSTOLIC BLOOD PRESSURE: 128 MMHG | OXYGEN SATURATION: 98 % | RESPIRATION RATE: 18 BRPM | HEIGHT: 69 IN | DIASTOLIC BLOOD PRESSURE: 82 MMHG | TEMPERATURE: 98 F | HEART RATE: 86 BPM | WEIGHT: 237.63 LBS

## 2024-11-12 DIAGNOSIS — Z79.4 TYPE 2 DIABETES MELLITUS WITH BOTH EYES AFFECTED BY MILD NONPROLIFERATIVE RETINOPATHY WITHOUT MACULAR EDEMA, WITH LONG-TERM CURRENT USE OF INSULIN: ICD-10-CM

## 2024-11-12 DIAGNOSIS — E66.812 CLASS 2 SEVERE OBESITY WITH SERIOUS COMORBIDITY AND BODY MASS INDEX (BMI) OF 36.0 TO 36.9 IN ADULT, UNSPECIFIED OBESITY TYPE: ICD-10-CM

## 2024-11-12 DIAGNOSIS — E66.01 CLASS 2 SEVERE OBESITY WITH SERIOUS COMORBIDITY AND BODY MASS INDEX (BMI) OF 36.0 TO 36.9 IN ADULT, UNSPECIFIED OBESITY TYPE: ICD-10-CM

## 2024-11-12 DIAGNOSIS — Z00.00 ENCOUNTER FOR PREVENTIVE HEALTH EXAMINATION: ICD-10-CM

## 2024-11-12 DIAGNOSIS — E11.3293 TYPE 2 DIABETES MELLITUS WITH BOTH EYES AFFECTED BY MILD NONPROLIFERATIVE RETINOPATHY WITHOUT MACULAR EDEMA, WITH LONG-TERM CURRENT USE OF INSULIN: ICD-10-CM

## 2024-11-12 DIAGNOSIS — Z72.3 LACK OF PHYSICAL ACTIVITY: ICD-10-CM

## 2024-11-12 DIAGNOSIS — E11.29 MICROALBUMINURIA DUE TO TYPE 2 DIABETES MELLITUS: ICD-10-CM

## 2024-11-12 DIAGNOSIS — E78.5 HYPERLIPIDEMIA ASSOCIATED WITH TYPE 2 DIABETES MELLITUS: ICD-10-CM

## 2024-11-12 DIAGNOSIS — E11.69 HYPERLIPIDEMIA ASSOCIATED WITH TYPE 2 DIABETES MELLITUS: ICD-10-CM

## 2024-11-12 DIAGNOSIS — E11.3293 MILD NONPROLIFERATIVE DIABETIC RETINOPATHY OF BOTH EYES WITHOUT MACULAR EDEMA ASSOCIATED WITH TYPE 2 DIABETES MELLITUS: ICD-10-CM

## 2024-11-12 DIAGNOSIS — E11.59 HYPERTENSION ASSOCIATED WITH DIABETES: ICD-10-CM

## 2024-11-12 DIAGNOSIS — Z12.5 SCREENING PSA (PROSTATE SPECIFIC ANTIGEN): ICD-10-CM

## 2024-11-12 DIAGNOSIS — I15.2 HYPERTENSION ASSOCIATED WITH DIABETES: ICD-10-CM

## 2024-11-12 DIAGNOSIS — G25.81 RLS (RESTLESS LEGS SYNDROME): Chronic | ICD-10-CM

## 2024-11-12 DIAGNOSIS — R80.9 MICROALBUMINURIA DUE TO TYPE 2 DIABETES MELLITUS: ICD-10-CM

## 2024-11-12 DIAGNOSIS — Z00.00 MEDICARE ANNUAL WELLNESS VISIT, SUBSEQUENT: Primary | ICD-10-CM

## 2024-11-12 DIAGNOSIS — R31.29 MICROSCOPIC HEMATURIA: ICD-10-CM

## 2024-11-12 PROCEDURE — 99213 OFFICE O/P EST LOW 20 MIN: CPT | Mod: 25,,, | Performed by: FAMILY MEDICINE

## 2024-11-12 PROCEDURE — G0439 PPPS, SUBSEQ VISIT: HCPCS | Mod: ,,, | Performed by: FAMILY MEDICINE

## 2024-11-12 NOTE — PATIENT INSTRUCTIONS
Medicare Annual Wellness Visit      Patient Name: Sergo Hollingsworth  Today's Date: 11/12/2024    Below you will find your 5-10 year Screening Plan Recommendations:  Health Maintenance       Date Due Completion Date    Shingles Vaccine (1 of 2) Never done ---    RSV Vaccine (Age 60+ and Pregnant patients) (1 - Risk 60-74 years 1-dose series) Never done ---    Pneumococcal Vaccines (Age 65+) (2 of 2 - PCV) 04/28/2018 4/28/2017    COVID-19 Vaccine (7 - 2024-25 season) 09/01/2024 12/13/2021    Foot Exam 10/30/2024 10/30/2023    Override on 10/2/2020: Done    Override on 11/12/2019: Done    Override on 12/14/2018: Done    Override on 1/11/2018: Done    Override on 12/16/2016: Done    Eye Exam 01/30/2025 1/30/2024    Hemoglobin A1c 05/06/2025 11/6/2024    Low Dose Statin 10/21/2025 10/21/2024    Diabetes Urine Screening 11/06/2025 11/6/2024    Lipid Panel 11/06/2025 11/6/2024    TETANUS VACCINE 04/28/2027 4/28/2017    Colorectal Cancer Screening 06/09/2027 6/9/2017          Below is your summarized Personalized Prevention Plan that addresses any concerns we discussed today at your visit. Please see attached detailed information specific to your Health Concerns.  No orders of the defined types were placed in this encounter.        The following information is provided to all patients.  This information is to help you find additional resources for any problems that may be affecting your health: Living healthy guide: www.[facility].[place].gov      Understanding Diabetes: www.diabetes.org      Eating healthy: www.cdc.gov/healthyweight      CDC home safety checklist: www.cdc.gov/steadi/patient.html      Agency on Aging: www.goea.[place].gov      Alcoholics anonymous (AA): www.aa.org      Physical Activity: www.[place].nih.gov/cb8lkxy      Tobacco use: www.quitwithusla.org                                 Patient Education       Exercise and Aging   General   Exercise can help older adults prevent falls and stay independent longer.  Exercise may also help:  You have stronger muscles and bones and better balance  You lose weight and have more energy  Make your heart and lungs stronger  Lower your chance of health problems like heart disease, high blood sugar, or breast or colon cancer  Control conditions like high blood pressure and diabetes  You manage stress and get better sleep  Your mood, self-esteem, and self-image  How you think, plan, and pay attention  There are four types of exercise: endurance, strength, balance, and flexibility.  Endurance exercises get your heart rate up and keep it up for a while. Most people should get at least 30 minutes of exercise that gets your heart rate up on 5 or more days each week. Walking, swimming, biking, or going up and down stairs are kinds of exercises to get your heart rate up. You do not have to do all 30 minutes at one time. Try doing 10 minutes 3 times a day.  Strength exercises build muscle. Lifting weights or doing knee bends are kinds of strength exercises.  Balance exercises help to prevent falls. Raise up on your toes or stand on one leg as a kind of balance exercise.  Flexibility exercises move your joints through a full range of motion and stretch your muscles. Bend forward in a chair to stretch your back, do stretches, or bend and extend your arms or legs as a kind of flexibility exercise. Try doing 10 minutes twice a week.  Plan to include all these exercise types in your exercise program. Always check with your doctor before you start a new exercise program.  Some people do not like formal exercise classes, but there are many ways to work your muscles each day. Here are some things you can do.  Use steps instead of elevators.  Park far away in parking lots to walk farther.  Use the time while you wait. Do knee bends as you hold onto the kitchen counter while you wait for your coffee to brew.  When you unload groceries, lift the bags or a container of milk a few times to exercise your  arms and legs.  Walk the long way when you go somewhere.  After you walk to the bathroom, walk a few laps around the house before sitting down.  Try doing different standing exercises after you wash your hands each time in the bathroom.  Do balance exercises while you brush your teeth.  Do an exercise or get up and walk during TV commercials.  Don't forget to exercise small muscle groups like your hands, fingers, ankles, toes, and neck. Wiggle, bend, flex, and rotate these joints from left to right and back to front to help to keep these joints flexible.  When do I need to call the doctor?   Stop exercising and talk to your doctor if you have any of these problems:  Dizziness  Shortness of breath  Pain or pressure in the chest, arms, throat, jaw, or back  Nausea or vomiting  Blood clots  Infection  Joint swelling  Open wounds  Recent hip, back, or eye surgery  New problems that start during exercise  Helpful tips   If you have a health problem like heart disease or diabetes, talk with your doctor about the best exercises for you.  Always warm up before you stretch. Heated muscles stretch much easier than cool muscles. Try to walk or bike at an easy pace for a few minutes to warm up your muscles.  Slow your pace again after you exercise to cool down and bring your heart rate down slowly.  Be sure you do not hold your breath when you exercise because it can raise your blood pressure. If you tend to hold your breath, try to count out loud when you exercise.  Never bounce when doing stretches. Use slow and steady motions and hold your stretch for 20 to 30 seconds.  Have a routine. Doing exercises before a meal may be a good way to get into a routine.  Set small goals for yourself when you start to exercise. Use a chart to see how much you are doing. Ask someone to exercise with you.  Exercise may be slightly uncomfortable, but you should not have sharp pains. If you do get sharp pains, stop what you are doing. If the  sharp pains continue, call your doctor.  Drink plenty of fluids without caffeine when you exercise and afterwards. Avoid outdoor exercise if it is too cold or too hot.  Wear the right clothes and shoes. Try layers of clothes, so that you can take them off if you get too hot. Shoes should fit well and support your feet.  Where can I learn more?   National [place] on Aging  https://www.[place].nih.gov/health/publication/exercise-physical-activity/introduction   Last Reviewed Date   2021-03-18  Consumer Information Use and Disclaimer   This information is not specific medical advice and does not replace information you receive from your health care provider. This is only a brief summary of general information. It does NOT include all information about conditions, illnesses, injuries, tests, procedures, treatments, therapies, discharge instructions or life-style choices that may apply to you. You must talk with your health care provider for complete information about your health and treatment options. This information should not be used to decide whether or not to accept your health care providers advice, instructions or recommendations. Only your health care provider has the knowledge and training to provide advice that is right for you.  Copyright   Copyright © 2021 [employer] and its affiliates and/or licensors. All rights reserved.

## 2024-11-12 NOTE — PROGRESS NOTES
Family Medicine      Patient ID: [id number]     Chief Complaint: Medicare Annual Wellness     HPI:     Sergo Hollingsworth is a 64 y.o. male here today for a Medicare Annual Wellness visit and comprehensive Health Risk Assessment.     A separate E/M code has been provided to evaluate additional complaints that the patient would like addressed during the dedicated Medicare Wellness Exam.    As a separate em visit, he has 1+ blood in urine x 2. Denies gross hematuria. PSA is normal.     Health Maintenance         Date Due Completion Date    RSV Vaccine (Age 60+ and Pregnant patients) (1 - Risk 60-74 years 1-dose series) Never done ---    COVID-19 Vaccine (7 - 2024-25 season) 09/01/2024 12/13/2021    Foot Exam 10/30/2024 10/30/2023    Override on 10/2/2020: Done    Override on 11/12/2019: Done    Override on 12/14/2018: Done    Override on 1/11/2018: Done    Override on 12/16/2016: Done    Eye Exam 01/30/2025 1/30/2024    Shingles Vaccine (1 of 2) 11/12/2025 (Originally 2/14/2008) ---    Pneumococcal Vaccines (Age 65+) (2 of 2 - PCV) 11/12/2025 (Originally 4/28/2018) 4/28/2017    Hemoglobin A1c 05/06/2025 11/6/2024    Diabetes Urine Screening 11/06/2025 11/6/2024    Lipid Panel 11/06/2025 11/6/2024    Low Dose Statin 11/12/2025 11/12/2024    TETANUS VACCINE 04/28/2027 4/28/2017    Colorectal Cancer Screening 06/09/2027 6/9/2017             Past Medical History:   Diagnosis Date    Arthritis     Diabetes mellitus, type 2     Hyperlipidemia     Hypertension         Past Surgical History:   Procedure Laterality Date    COLONOSCOPY N/A 06/09/2017    Procedure: COLONOSCOPY;  Surgeon: Mickey Kahn MD;  Location: [facility];  Service: Endoscopy;  Laterality: N/A;    KNEE SURGERY  2010    right knee to repair a torn meniscus        Social History     Socioeconomic History    Marital status:    Occupational History    Occupation:    Tobacco Use    Smoking status: Never    Smokeless tobacco: Current      Types: Chew   Substance and Sexual Activity    Alcohol use: Not Currently     Comment: social    Drug use: No    Sexual activity: Yes     Partners: Female     Social Drivers of Health     Financial Resource Strain: Low Risk  (11/12/2024)    Overall Financial Resource Strain (CARDIA)     Difficulty of Paying Living Expenses: Not hard at all   Food Insecurity: No Food Insecurity (11/12/2024)    Hunger Vital Sign     Worried About Running Out of Food in the Last Year: Never true     Ran Out of Food in the Last Year: Never true   Transportation Needs: No Transportation Needs (11/12/2024)    TRANSPORTATION NEEDS     Transportation : No   Physical Activity: Insufficiently Active (11/12/2024)    Exercise Vital Sign     Days of Exercise per Week: 1 day     Minutes of Exercise per Session: 20 min   Stress: No Stress Concern Present (11/12/2024)    [employer] [employer] - Occupational Stress Questionnaire     Feeling of Stress : Not at all   Housing Stability: Low Risk  (11/12/2024)    Housing Stability Vital Sign     Unable to Pay for Housing in the Last Year: No     Homeless in the Last Year: No        Family History   Problem Relation Name Age of Onset    Pancreatic cancer Mother Marlene     Cancer Mother Marlene     Lung cancer Father Keenan     Cancer Father Keenan     Diabetes Sister Jennifer     Hypertension Sister Jennifer     Hypertension Brother Duy     Heart disease Maternal Grandmother Mama     Hypertension Maternal Grandmother Mama     Throat cancer Maternal Grandfather      Diabetes Paternal Grandmother Mama     Heart disease Paternal Grandmother Mama     Hypertension Paternal Grandmother Mama     Lung cancer Paternal Grandfather      Amblyopia Neg Hx      Blindness Neg Hx      Cataracts Neg Hx      Glaucoma Neg Hx      Macular degeneration Neg Hx      Retinal detachment Neg Hx      Strabismus Neg Hx      Thyroid disease Neg Hx      Stroke Neg Hx          Current Outpatient Medications   Medication  "Instructions    amLODIPine-benazepriL (LOTREL) 10-40 mg per capsule 1 capsule, Oral, Daily    atorvastatin (LIPITOR) 10 mg, Oral, Daily    BASAGLAR KWIKPEN U-100 INSULIN 20 Units, Subcutaneous, Daily    blood sugar diagnostic Strp To check BG 1 times daily, to use with insurance preferred meter    blood-glucose meter kit To check BG 1 times daily, to use with insurance preferred meter    gabapentin (NEURONTIN) 300 mg, Oral, Daily    glipiZIDE 5 mg, Oral, With breakfast    lancets Misc To check BG 1 times daily, to use with insurance preferred meter    metFORMIN (GLUCOPHAGE-XR) 2,000 mg, Oral, Nightly    pen needle, diabetic (PEN NEEDLE) 32 gauge x 5/32" Ndle To use once a day with insulin pen       Review of patient's allergies indicates:  No Known Allergies     Immunization History   Administered Date(s) Administered    COVID-19 MRNA, LN-S PF (MODERNA HALF 0.25 ML DOSE) 12/13/2021    COVID-19 Vaccine 02/27/2021, 03/23/2021, 12/13/2021    COVID-19, MRNA, LN-S, PF (MODERNA FULL 0.5 ML DOSE) 02/27/2021, 03/23/2021    Pneumococcal Polysaccharide - 23 Valent 04/28/2017    Tdap 04/28/2017        Patient Care Team:  Jessica Puentes MD as PCP - General (Family Medicine)  Hilda Riley MD as Consulting Physician (Family Medicine)  Maricarmen Blandon MD (Cardiovascular Disease)  Inés Blanchard LPN (Inactive) as Licensed Practical Nurse  Mickey Kahn MD as Consulting Physician (Gastroenterology)  Keila Alex MD (Inactive) as Consulting Physician (Ophthalmology)  Emily Pate LPN as Care Coordinator (Family Medicine)  Courtney Alan MD as Consulting Physician (Ophthalmology)    Subjective:     Review of Systems    12 point review of systems conducted, negative except as stated in the history of present illness. See HPI for details.    Objective:     Visit Vitals  /82 (BP Location: Left arm, Patient Position: Sitting)   Pulse 86   Temp 97.9 °F (36.6 °C) (Oral)   Resp 18 " "  Ht 5' 9" (1.753 m)   Wt 107.8 kg (237 lb 9.6 oz)   SpO2 98%   BMI 35.09 kg/m²       Physical Exam    Gen: alert, pleasant  Eyes: EOMI, no scleral injection  ENT: TM+ light reflexes bilaterally, no cerumen impaction or canal edema/erythema/discharge. No pharyngeal erythema, tonsillary hypertrophy  Oropharynx: no tonsillary hypertrophy, dental caries or disease. No lesions of palate, tongue.  CV: RRR no m/r/g, no LE edema. No JVD  Resp: CTAB, no accessory muscle use  GI: BS+, nontender, nondistended  Psych: AA&Ox3  Protective Sensation (w/ 10 gram monofilament):  Right: Intact  Left: Intact    Visual Inspection:  Normal -  Bilateral    Pedal Pulses:   Right: Present  Left: Present    Posterior Tibialis Pulses:   Right:Present  Left: Present    Assessment:       ICD-10-CM ICD-9-CM   1. Medicare annual wellness visit, subsequent  Z00.00 V70.0   2. Encounter for preventive health examination  Z00.00 V70.0   3. Lack of physical activity  Z72.3 V69.0   4. Mild nonproliferative diabetic retinopathy of both eyes without macular edema associated with type 2 diabetes mellitus  E11.3293 250.50     362.04   5. Hypertension associated with diabetes  E11.59 250.80    I15.2 401.9   6. Hyperlipidemia associated with type 2 diabetes mellitus  E11.69 250.80    E78.5 272.4   7. Type 2 diabetes mellitus with both eyes affected by mild nonproliferative retinopathy without macular edema, with long-term current use of insulin  E11.3293 250.50    Z79.4 362.04     V58.67   8. Microalbuminuria due to type 2 diabetes mellitus  E11.29 250.40    R80.9 791.0   9. Class 2 severe obesity with serious comorbidity and body mass index (BMI) of 36.0 to 36.9 in adult, unspecified obesity type  E66.812 278.01    Z68.36 V85.36    E66.01    10. RLS (restless legs syndrome)  G25.81 333.94   11. Microscopic hematuria  R31.29 599.72   12. Screening PSA (prostate specific antigen)  Z12.5 V76.44        Plan:     1. Medicare annual wellness visit, " subsequent  -     CBC Auto Differential; Future; Expected date: 11/12/2025  -     Comprehensive Metabolic Panel; Future; Expected date: 11/12/2025  -     Lipid Panel; Future; Expected date: 11/12/2025  -     TSH; Future; Expected date: 11/12/2025  -     Hemoglobin A1C; Future; Expected date: 11/12/2025  -     Urinalysis; Future; Expected date: 11/12/2025  -     PSA, Screening; Future; Expected date: 11/12/2025  -     Microalbumin/Creatinine Ratio, Urine; Future; Expected date: 11/12/2025    2. Encounter for preventive health examination    3. Lack of physical activity    4. Mild nonproliferative diabetic retinopathy of both eyes without macular edema associated with type 2 diabetes mellitus  Overview:  Followed by ophtho. A1c at goal    Continue current Rx meds        5. Hypertension associated with diabetes  Overview:  At goan on lotrel. Asymptomatic    Continue current Rx meds        6. Hyperlipidemia associated with type 2 diabetes mellitus  Overview:  ldl at goal on statin. Asymptomatic    Continue current Rx meds for now      Orders:  -     CBC Auto Differential; Future; Expected date: 11/12/2025  -     Comprehensive Metabolic Panel; Future; Expected date: 11/12/2025  -     Lipid Panel; Future; Expected date: 11/12/2025    7. Type 2 diabetes mellitus with both eyes affected by mild nonproliferative retinopathy without macular edema, with long-term current use of insulin  Overview:  a1c down from 7.2 to 6.6. On basaglar 20 units, metformin 500 mg ER daily with mild microalbuminuria.  NO concerns or complaints.     Continue current Rx meds  Rtc 3 mts with labs        Orders:  -     Hemoglobin A1C; Future; Expected date: 02/12/2025  -     Basic Metabolic Panel; Future; Expected date: 02/12/2025  -     Microalbumin/Creatinine Ratio, Urine; Future; Expected date: 02/12/2025  -     CBC Auto Differential; Future; Expected date: 11/12/2025  -     Comprehensive Metabolic Panel; Future; Expected date: 11/12/2025  -      Lipid Panel; Future; Expected date: 11/12/2025  -     TSH; Future; Expected date: 11/12/2025  -     Hemoglobin A1C; Future; Expected date: 11/12/2025  -     Urinalysis; Future; Expected date: 11/12/2025  -     Microalbumin/Creatinine Ratio, Urine; Future; Expected date: 11/12/2025    8. Microalbuminuria due to type 2 diabetes mellitus  Overview:  Borderline high. Will monitor for now      9. Class 2 severe obesity with serious comorbidity and body mass index (BMI) of 36.0 to 36.9 in adult, unspecified obesity type  Overview:  Patient stay active by walking around [place] outside. Eats a regular diet.     - exercise for 30-45 min a day, 5x a week  - eat a total of 1800 calories daily with less than 70 total carbohydrates daily  - use my fitness pal to log foods and track calories  - eat lean meats like chicken, turkey, fish, lean ground beef. Avoid fried, fatty or processed foods.  - do not eat pasta, bread, rice, potatoes often        10. RLS (restless legs syndrome)  Overview:  Well controlled with gabapentin at bedtime    Continue current Rx meds        11. Microscopic hematuria  Overview:  Ct abd pelvis w/wo and then refer to urology    Orders:  -     CT Abdomen Pelvis W Wo Contrast; Future; Expected date: 11/12/2024    12. Screening PSA (prostate specific antigen)  -     PSA, Screening; Future; Expected date: 11/12/2025         A comprehensive HEALTH RISK ASSESSMENT was completed today. Results are summarized below:    There are NO EMOTIONAL/SOCIAL CONCERNS identified on today's screening for Social Isolation, Depression and Anxiety.    There are NO COGNITIVE FUNCTION CONCERNS identified on today's screening.     The patient reports NO OPIOID PRESCRIPTIONS. This was confirmed through medication reconciliation and the [facility] website.    The patient is A CURRENT TOBACCO USER.  Tobacco Use: High Risk (11/12/2024)    Patient History     Smoking Tobacco Use: Never     Smokeless Tobacco Use: Current     Passive  Exposure: Not on file     The patient reports NO SIGNIFICANT ALCOHOL USE.     All Questions regarding food, transportation or housing were not answered today.    The patient was asked and declined the use of a free .    Advance Care Planning   Today we discussed advance care planning. He is interested in learning more about how to make Advance Directives. Information was provided and I offered to discuss more at his discretion.         Provided patient with a 5-10 year written screening schedule and personal prevention plan. Recommendations were developed using the USPSTF age appropriate recommendations. Education, counseling, and referrals were provided as needed. After Visit Summary printed and given to patient, which includes a list of additional screenings\tests needed.    No follow-ups on file. In addition to their scheduled follow up, the patient has also been instructed to follow up on as needed basis.     Future Appointments   Date Time Provider Department Center   2/13/2025  7:30 AM Ernesto Puentes MD [facility] [place]   11/20/2025  8:15 AM Ernesto Puentes MD [facility] [place]        ERNESTO PUENTES MD   Advance Care Planning   Today we discussed advance care planning. He is interested in learning more about how to make Advance Directives. Information was provided and I offered to discuss more at his discretion.

## 2024-11-25 ENCOUNTER — HOSPITAL ENCOUNTER (OUTPATIENT)
Dept: RADIOLOGY | Facility: HOSPITAL | Age: 64
Discharge: HOME | End: 2024-11-25
Attending: FAMILY MEDICINE
Payer: MEDICARE

## 2024-11-25 DIAGNOSIS — R31.29 MICROSCOPIC HEMATURIA: ICD-10-CM

## 2024-11-25 PROCEDURE — 74178 CT ABD&PLV WO CNTR FLWD CNTR: CPT | Mod: TC

## 2024-11-25 PROCEDURE — 25500020 PHARM REV CODE 255: Performed by: FAMILY MEDICINE

## 2024-11-25 RX ADMIN — IOHEXOL 75 ML: 350 INJECTION, SOLUTION INTRAVENOUS at 03:11

## 2024-11-26 DIAGNOSIS — R31.29 MICROSCOPIC HEMATURIA: Primary | ICD-10-CM

## 2024-11-26 PROBLEM — I70.0 ABDOMINAL AORTIC ATHEROSCLEROSIS: Status: ACTIVE | Noted: 2024-11-26

## 2024-12-04 ENCOUNTER — PATIENT MESSAGE (OUTPATIENT)
Dept: FAMILY MEDICINE | Facility: CLINIC | Age: 64
End: 2024-12-04
Payer: MEDICARE

## 2025-01-15 ENCOUNTER — PATIENT OUTREACH (OUTPATIENT)
Facility: CLINIC | Age: 65
End: 2025-01-15
Payer: MEDICARE

## 2025-01-15 LAB
LEFT EYE DM RETINOPATHY: NEGATIVE
RIGHT EYE DM RETINOPATHY: NEGATIVE

## 2025-01-15 NOTE — PROGRESS NOTES
Health Maintenance Topic(s) Outreach Outcomes & Actions Taken:    Eye Exam - Outreach Outcomes & Actions Taken  : Diabetic Eye External Records Uploaded, Care Team & History Updated if Applicable       Additional Notes:  DM Eye Exam 1/15/25

## 2025-02-05 NOTE — PROGRESS NOTES
Subjective:      Patient ID: Sergo Hollingsworth is a 60 y.o. male.    Chief Complaint: Heel Pain (Dr Parry 7/2020 )    Sergo is a 60 y.o. male who presents to the podiatry clinic  with complaint of  left foot pain posterior medial heel area. Onset of the symptoms was several weeks ago. Precipitating event: none known. Current symptoms include: ability to bear weight, but with some pain and worsening symptoms after a period of activity. Aggravating factors: any weight bearing. Symptoms have progressed to a point and plateaued. Patient has had no prior foot problems. Evaluation to date: none. Treatment to date: none. Patients rates pain 1/10 on pain scale.    10/2/20: Patient returns for follow up left achilles pain that has returned, it was feeling better but he has been on his feet a lot preparing for a move and the pain has started coming back. Pain 3/10 and similar in nature as before    Review of Systems   Constitution: Negative for chills and fever.   Cardiovascular: Negative for claudication and leg swelling.   Respiratory: Negative for shortness of breath.    Skin: Negative for itching, nail changes and rash.   Musculoskeletal: Negative for muscle cramps, muscle weakness and myalgias.        Left heel pain   Gastrointestinal: Negative for nausea and vomiting.   Neurological: Negative for focal weakness, loss of balance, numbness and paresthesias.           Objective:      Physical Exam  Constitutional:       General: He is not in acute distress.     Appearance: He is well-developed. He is not diaphoretic.   Cardiovascular:      Pulses:           Dorsalis pedis pulses are 2+ on the right side and 2+ on the left side.        Posterior tibial pulses are 2+ on the right side and 2+ on the left side.      Comments: < 3 sec capillary refill time to toes 1-5 bilateral. Toes and feet are warm to touch proximally with normal distal cooling b/l. There is some hair growth on the feet and toes b/l. There is no edema b/l. No  spider veins or varicosities present b/l.     Musculoskeletal:      Comments: Equinus noted b/l ankles with < 10 deg DF noted. MMT 5/5 in DF/PF/Inv/Ev resistance with no reproduction of pain in any direction. Passive range of motion of ankle and pedal joints is painless b/l.    Left posterior heel pain to palpation to the achilles attachment site on the calcaneus more on the medial aspect. No pain with PF against resistance minimal discomfort to DF end ROM with out resistance     Skin:     General: Skin is warm and dry.      Coloration: Skin is not pale.      Findings: No abrasion, bruising, burn, ecchymosis, erythema, laceration, lesion, petechiae or rash.      Nails: There is no clubbing.        Comments: Skin temperature, texture and turgor within normal limits.   Neurological:      Mental Status: He is alert and oriented to person, place, and time.      Sensory: No sensory deficit.      Motor: No tremor, atrophy or abnormal muscle tone.      Comments: Negative tinel sign bilateral.   Psychiatric:         Behavior: Behavior normal.               Assessment:       Encounter Diagnoses   Name Primary?    Tendonitis, Achilles, left Yes    Retrocalcaneal exostosis          Plan:       Sergo was seen today for heel pain.    Diagnoses and all orders for this visit:    Tendonitis, Achilles, left    Retrocalcaneal exostosis      I counseled the patient on his conditions, their implications and medical management.    The patient was advised that NSAID-type medications have two very important potential side effects: gastrointestinal irritation including hemorrhage and renal injuries. He was asked to take the medication with food and to stop if he experiences any GI upset. Can take the naproxen as needed    Patient will stretch the tendo achilles complex three times daily as demonstrated in the office.  Literature was dispensed illustrating proper stretching technique.    Patient instructed on adequate icing techniques.  Patient should ice the affected area at least once per day x 10 minutes for 10 days . I advised the  patient that extra icing would also be beneficial to ensure adequate anti inflammatory effect       Heel lifts can be worn as needed    Declined PT as he is in the middle of a move to [facility] SAI Guzman, TAYM                        Quality 130: Documentation Of Current Medications In The Medical Record: Current Medications Documented Detail Level: Detailed Quality 431: Preventive Care And Screening: Unhealthy Alcohol Use - Screening: Patient not identified as an unhealthy alcohol user when screened for unhealthy alcohol use using a systematic screening method Quality 226: Preventive Care And Screening: Tobacco Use: Screening And Cessation Intervention: Patient screened for tobacco use and is an ex/non-smoker

## 2025-02-12 ENCOUNTER — LAB VISIT (OUTPATIENT)
Dept: LAB | Facility: HOSPITAL | Age: 65
End: 2025-02-12
Attending: FAMILY MEDICINE
Payer: MEDICARE

## 2025-02-12 DIAGNOSIS — Z79.4 TYPE 2 DIABETES MELLITUS WITH BOTH EYES AFFECTED BY MILD NONPROLIFERATIVE RETINOPATHY WITHOUT MACULAR EDEMA, WITH LONG-TERM CURRENT USE OF INSULIN: ICD-10-CM

## 2025-02-12 DIAGNOSIS — E11.3293 TYPE 2 DIABETES MELLITUS WITH BOTH EYES AFFECTED BY MILD NONPROLIFERATIVE RETINOPATHY WITHOUT MACULAR EDEMA, WITH LONG-TERM CURRENT USE OF INSULIN: ICD-10-CM

## 2025-02-12 LAB
ANION GAP SERPL CALC-SCNC: 4 MEQ/L
BUN SERPL-MCNC: 17.8 MG/DL (ref 8.4–25.7)
CALCIUM SERPL-MCNC: 9.2 MG/DL (ref 8.8–10)
CHLORIDE SERPL-SCNC: 108 MMOL/L (ref 98–107)
CO2 SERPL-SCNC: 28 MMOL/L (ref 23–31)
CREAT SERPL-MCNC: 1.1 MG/DL (ref 0.72–1.25)
CREAT UR-MCNC: 192.8 MG/DL (ref 63–166)
CREAT/UREA NIT SERPL: 16
EST. AVERAGE GLUCOSE BLD GHB EST-MCNC: 139.9 MG/DL
GFR SERPLBLD CREATININE-BSD FMLA CKD-EPI: >60 ML/MIN/1.73/M2
GLUCOSE SERPL-MCNC: 107 MG/DL (ref 82–115)
HBA1C MFR BLD: 6.5 %
MICROALBUMIN UR-MCNC: 18.5 UG/ML
MICROALBUMIN/CREAT RATIO PNL UR: 9.6 MG/GM CR (ref 0–30)
POTASSIUM SERPL-SCNC: 4.4 MMOL/L (ref 3.5–5.1)
SODIUM SERPL-SCNC: 140 MMOL/L (ref 136–145)

## 2025-02-12 PROCEDURE — 83036 HEMOGLOBIN GLYCOSYLATED A1C: CPT

## 2025-02-12 PROCEDURE — 80048 BASIC METABOLIC PNL TOTAL CA: CPT

## 2025-02-12 PROCEDURE — 36415 COLL VENOUS BLD VENIPUNCTURE: CPT

## 2025-02-12 PROCEDURE — 82570 ASSAY OF URINE CREATININE: CPT

## 2025-02-13 ENCOUNTER — OFFICE VISIT (OUTPATIENT)
Dept: FAMILY MEDICINE | Facility: CLINIC | Age: 65
End: 2025-02-13
Payer: MEDICARE

## 2025-02-13 VITALS
HEART RATE: 80 BPM | OXYGEN SATURATION: 98 % | SYSTOLIC BLOOD PRESSURE: 138 MMHG | HEIGHT: 69 IN | DIASTOLIC BLOOD PRESSURE: 82 MMHG | WEIGHT: 241.19 LBS | TEMPERATURE: 98 F | BODY MASS INDEX: 35.72 KG/M2 | RESPIRATION RATE: 18 BRPM

## 2025-02-13 DIAGNOSIS — E11.59 HYPERTENSION ASSOCIATED WITH DIABETES: Primary | ICD-10-CM

## 2025-02-13 DIAGNOSIS — I15.2 HYPERTENSION ASSOCIATED WITH DIABETES: Primary | ICD-10-CM

## 2025-02-13 DIAGNOSIS — E11.3293 TYPE 2 DIABETES MELLITUS WITH BOTH EYES AFFECTED BY MILD NONPROLIFERATIVE RETINOPATHY WITHOUT MACULAR EDEMA, WITH LONG-TERM CURRENT USE OF INSULIN: ICD-10-CM

## 2025-02-13 DIAGNOSIS — Z79.4 TYPE 2 DIABETES MELLITUS WITH BOTH EYES AFFECTED BY MILD NONPROLIFERATIVE RETINOPATHY WITHOUT MACULAR EDEMA, WITH LONG-TERM CURRENT USE OF INSULIN: ICD-10-CM

## 2025-02-13 PROCEDURE — 99214 OFFICE O/P EST MOD 30 MIN: CPT | Mod: ,,, | Performed by: FAMILY MEDICINE

## 2025-02-13 PROCEDURE — G2211 COMPLEX E/M VISIT ADD ON: HCPCS | Mod: ,,, | Performed by: FAMILY MEDICINE

## 2025-02-13 NOTE — PROGRESS NOTES
Sergo Hollingsworth  02/13/2025  [id number]    Jessica Puentes MD  Patient Care Team:  Jessica Puentes MD as PCP - General (Family Medicine)  Hilda Riley MD as Consulting Physician (Family Medicine)  Inés Blanchard LPN (Inactive) as Licensed Practical Nurse  Mickey Kahn MD as Consulting Physician (Gastroenterology)  Keila Alex MD (Inactive) as Consulting Physician (Ophthalmology)  Emily Pate LPN as Care Coordinator (Family Medicine)  Courtney Alan MD as Consulting Physician (Ophthalmology)      Chief Complaint:  Chief Complaint   Patient presents with    Follow-up     3 month f/u with lab results       History of Present Illness:    64 y.o. male who presents today for diabetes f/u with labs. Labs reviewed by me and were discussed with patient. All questions regarding lab results were answered. He is doing well. No complaints or concerns.     Visit today included increased complexity associated with the care of the episodic problem htn, dmii addressed and managing the longitudinal care of the patient due to the serious and/or complex managed problem(s) .      Review of Systems  General: denies f/c, weight loss, night sweats, decreased appetite  Eye: denies blurred vision, changes in vision  Respiratory: denies sob, wheezing, cough  Cardiovascular: denies chest pain, palpitations, edema  Gastrointestinal: denies abdominal pain, n/v, constipation, diarrhea  Integumentary: denies rashes, pruritis    Past Medical History  Past Medical History:   Diagnosis Date    Arthritis     Diabetes mellitus, type 2     Hyperlipidemia     Hypertension        Medications  Medication List with Changes/Refills   Current Medications    AMLODIPINE-BENAZEPRIL (LOTREL) 10-40 MG PER CAPSULE    Take 1 capsule by mouth Daily.    ATORVASTATIN (LIPITOR) 10 MG TABLET    Take 1 tablet (10 mg total) by mouth once daily.    BLOOD SUGAR DIAGNOSTIC STRP    To check BG 1 times daily, to use  "with insurance preferred meter    BLOOD-GLUCOSE METER KIT    To check BG 1 times daily, to use with insurance preferred meter    GABAPENTIN (NEURONTIN) 300 MG CAPSULE    Take 1 capsule (300 mg total) by mouth Daily.    GLIPIZIDE 5 MG TR24    Take 1 tablet (5 mg total) by mouth daily with breakfast.    INSULIN (BASAGLAR KWIKPEN U-100 INSULIN) GLARGINE 100 UNITS/ML SUBQ PEN    Inject 20 Units into the skin once daily.    LANCETS MISC    To check BG 1 times daily, to use with insurance preferred meter    METFORMIN (GLUCOPHAGE-XR) 500 MG ER 24HR TABLET    Take 4 tablets (2,000 mg total) by mouth nightly.    PEN NEEDLE, DIABETIC (PEN NEEDLE) 32 GAUGE X 5/32" NDLE    To use once a day with insulin pen       Past Surgical History:   Procedure Laterality Date    COLONOSCOPY N/A 06/09/2017    Procedure: COLONOSCOPY;  Surgeon: Mickey Kahn MD;  Location: [facility];  Service: Endoscopy;  Laterality: N/A;    KNEE SURGERY  2010    right knee to repair a torn meniscus       SUBJECTIVE:  Health Maintenance  Health Maintenance Topics with due status: Not Due       Topic Last Completion Date    TETANUS VACCINE 04/28/2017    Colorectal Cancer Screening 06/09/2017    Lipid Panel 11/06/2024    Foot Exam 11/12/2024    Diabetic Eye Exam 01/15/2025    Diabetes Urine Screening 02/12/2025    Hemoglobin A1c 02/12/2025    Low Dose Statin 02/13/2025     Health Maintenance Due   Topic Date Due    RSV Vaccine (Age 60+ and Pregnant patients) (1 - Risk 60-74 years 1-dose series) Never done    COVID-19 Vaccine (7 - 2024-25 season) 09/01/2024       Exam:  Vitals:    02/13/25 0735   BP: 138/82   BP Location: Right arm   Patient Position: Sitting   Pulse: 80   Resp: 18   Temp: 97.9 °F (36.6 °C)   TempSrc: Oral   SpO2: (!) 9%   Weight: 109.4 kg (241 lb 3.2 oz)   Height: 5' 9" (1.753 m)     Weight: 109.4 kg (241 lb 3.2 oz)   Body mass index is 35.62 kg/m².      Physical Exam  Constitutional: NAD, alert, pleasant  Respiratory: CTAB, no wheezes, " rales or rhonchi. No accessory muscle use  Eyes: EOMI  Cardiovascular: RRR, No m/r/g. No JVD. No LE edema  Gastrointestinal: BS+, nontender, nondistended  Integumentary: warm, dry, intact  Psych: AA&Ox3      ICD-10-CM ICD-9-CM   1. Hypertension associated with diabetes  E11.59 250.80    I15.2 401.9   2. Type 2 diabetes mellitus with both eyes affected by mild nonproliferative retinopathy without macular edema, with long-term current use of insulin  E11.3293 250.50    Z79.4 362.04     V58.67       1. Hypertension associated with diabetes  Overview:  At [place] on lotrel. Asymptomatic    Continue current Rx meds        2. Type 2 diabetes mellitus with both eyes affected by mild nonproliferative retinopathy without macular edema, with long-term current use of insulin  Overview:  a1c down from 7.2 to 6.5. On basaglar 20 units, metformin 500 mg ER daily with mild microalbuminuria.  NO concerns or complaints.     Continue current Rx meds  Rtc 3 mts with labs        Orders:  -     Hemoglobin A1C; Future; Expected date: 05/13/2025  -     Basic Metabolic Panel; Future; Expected date: 05/13/2025         Follow up: Follow up for 3 mts diabetes with labs.      Care Plan/Goals: Reviewed   Goals    None

## 2025-03-02 ENCOUNTER — PATIENT MESSAGE (OUTPATIENT)
Dept: FAMILY MEDICINE | Facility: CLINIC | Age: 65
End: 2025-03-02
Payer: MEDICARE

## 2025-03-03 ENCOUNTER — OFFICE VISIT (OUTPATIENT)
Dept: URGENT CARE | Facility: CLINIC | Age: 65
End: 2025-03-03
Payer: MEDICARE

## 2025-03-03 VITALS
WEIGHT: 235 LBS | HEART RATE: 91 BPM | BODY MASS INDEX: 35.61 KG/M2 | RESPIRATION RATE: 20 BRPM | HEIGHT: 68 IN | SYSTOLIC BLOOD PRESSURE: 135 MMHG | TEMPERATURE: 98 F | DIASTOLIC BLOOD PRESSURE: 75 MMHG | OXYGEN SATURATION: 97 %

## 2025-03-03 DIAGNOSIS — R35.0 URINARY FREQUENCY: Primary | ICD-10-CM

## 2025-03-03 LAB
BILIRUB UR QL STRIP: NEGATIVE
GLUCOSE UR QL STRIP: POSITIVE
KETONES UR QL STRIP: NEGATIVE
LEUKOCYTE ESTERASE UR QL STRIP: NEGATIVE
PH, POC UA: 5
POC BLOOD, URINE: NEGATIVE
POC NITRATES, URINE: NEGATIVE
PROT UR QL STRIP: NEGATIVE
SP GR UR STRIP: 1.01 (ref 1–1.03)
UROBILINOGEN UR STRIP-ACNC: ABNORMAL (ref 0.3–2.2)

## 2025-03-03 PROCEDURE — 81003 URINALYSIS AUTO W/O SCOPE: CPT | Mod: QW,,, | Performed by: NURSE PRACTITIONER

## 2025-03-03 PROCEDURE — 99213 OFFICE O/P EST LOW 20 MIN: CPT | Mod: ,,, | Performed by: NURSE PRACTITIONER

## 2025-03-03 NOTE — PROGRESS NOTES
"Subjective:      Patient ID: Sergo Hollingsworth is a 65 y.o. male.    Vitals:  height is 5' 8" (1.727 m) and weight is 106.6 kg (235 lb). His temperature is 97.7 °F (36.5 °C). His blood pressure is 135/75 and his pulse is 91. His respiration is 20 and oxygen saturation is 97%.     Chief Complaint: Urinary Frequency     Patient is a 65 y.o. male who presents to urgent care with complaints of urinary frequency and slight pain  x1 week.. Alleviating factors include drinking a lot of water with moderate amount of relief. Patient denies any fever or hematuria.    States symptoms are improving slightly but denies any fever hematuria or pain with urination currently.  Has been well under control with diabetes in the past and has no past medical history of kidney stones.    Was evaluated by Urology 2 weeks ago with the cystoscope and all results after this procedure were negative he had no issues for 7 days after this procedure.      Urinary Frequency   Associated symptoms include frequency. Pertinent negatives include no flank pain or hematuria.       Constitution: Negative for fatigue and fever.   Genitourinary:  Positive for frequency. Negative for urine decreased, flank pain, bladder incontinence, hematuria, history of kidney stones, genital sore, penile pain, penile swelling, scrotal swelling and testicular pain.      Objective:     Physical Exam   Constitutional: He is oriented to person, place, and time. He appears well-developed. He is cooperative.  Non-toxic appearance. He does not appear ill. No distress.   HENT:   Head: Normocephalic and atraumatic.   Ears:   Right Ear: Hearing, tympanic membrane, external ear and ear canal normal.   Left Ear: Hearing, tympanic membrane, external ear and ear canal normal.   Nose: Nose normal. No mucosal edema, rhinorrhea or nasal deformity. No epistaxis. Right sinus exhibits no maxillary sinus tenderness and no frontal sinus tenderness. Left sinus exhibits no maxillary sinus " tenderness and no frontal sinus tenderness.   Mouth/Throat: Uvula is midline, oropharynx is clear and moist and mucous membranes are normal. No trismus in the jaw. Normal dentition. No uvula swelling. No oropharyngeal exudate, posterior oropharyngeal edema or posterior oropharyngeal erythema.   Eyes: Conjunctivae and lids are normal. No scleral icterus.   Neck: Trachea normal and phonation normal. Neck supple. No edema present. No erythema present. No neck rigidity present.   Cardiovascular: Normal rate, regular rhythm, normal heart sounds and normal pulses.   Pulmonary/Chest: Effort normal and breath sounds normal. No respiratory distress. He has no decreased breath sounds. He has no rhonchi.   Abdominal: Normal appearance. There is no left CVA tenderness and no right CVA tenderness.   Musculoskeletal: Normal range of motion.         General: No deformity. Normal range of motion.   Neurological: He is alert and oriented to person, place, and time. He exhibits normal muscle tone. Coordination normal.   Skin: Skin is warm, dry, intact, not diaphoretic and not pale.   Psychiatric: His speech is normal and behavior is normal. Judgment and thought content normal.   Nursing note and vitals reviewed.         Previous History      Review of patient's allergies indicates:  No Known Allergies    Past Medical History:   Diagnosis Date    Arthritis     Diabetes mellitus, type 2     Hyperlipidemia     Hypertension      Current Outpatient Medications   Medication Instructions    amLODIPine-benazepriL (LOTREL) 10-40 mg per capsule 1 capsule, Oral, Daily    atorvastatin (LIPITOR) 10 mg, Oral, Daily    BASAGLAR KWIKPEN U-100 INSULIN 20 Units, Subcutaneous, Daily    blood sugar diagnostic Strp To check BG 1 times daily, to use with insurance preferred meter    blood-glucose meter kit To check BG 1 times daily, to use with insurance preferred meter    gabapentin (NEURONTIN) 300 mg, Oral, Daily    glipiZIDE 5 mg, Oral, With breakfast  "   lancets Misc To check BG 1 times daily, to use with insurance preferred meter    metFORMIN (GLUCOPHAGE-XR) 2,000 mg, Oral, Nightly    pen needle, diabetic (PEN NEEDLE) 32 gauge x 5/32" Ndle To use once a day with insulin pen     Past Surgical History:   Procedure Laterality Date    COLONOSCOPY N/A 06/09/2017    Procedure: COLONOSCOPY;  Surgeon: Mickey Kahn MD;  Location: [facility];  Service: Endoscopy;  Laterality: N/A;    KNEE SURGERY  2010    right knee to repair a torn meniscus         Social History[1]     Physical Exam      Vital Signs Reviewed   /75   Pulse 91   Temp 97.7 °F (36.5 °C)   Resp 20   Ht 5' 8" (1.727 m)   Wt 106.6 kg (235 lb)   SpO2 97%   BMI 35.73 kg/m²        Procedures    Procedures     Labs     Results for orders placed or performed in visit on 03/03/25   POCT Urinalysis, Dipstick, Manual, W/O Scope    Collection Time: 03/03/25 11:46 AM   Result Value Ref Range    POC Blood, Urine Negative Negative    POC Bilirubin, Urine Negative Negative    POC Urobilinogen, Urine norm 0.3 - 2.2    POC Ketones, Urine Negative Negative    POC Protein, Urine Negative Negative    POC Nitrates, Urine Negative Negative    POC Glucose, Urine Positive (A) Negative    pH, UA 5     POC Specific Gravity, Urine 1.010 1.003 - 1.029    POC Leukocytes, Urine Negative Negative      Assessment:     1. Urinary frequency        Plan:   Discussed close monitoring at home for any worsening of symptoms and to be re-evaluated this does occur otherwise we will continue drinking plenty of fluids and to follow up with PCP as needed    Urinary frequency  -     POCT Urinalysis, Dipstick, Manual, W/O Scope                         [1]   Social History  Tobacco Use    Smoking status: Never    Smokeless tobacco: Current     Types: Chew   Substance Use Topics    Alcohol use: Not Currently     Comment: social    Drug use: No     "

## 2025-03-25 ENCOUNTER — TELEPHONE (OUTPATIENT)
Dept: FAMILY MEDICINE | Facility: CLINIC | Age: 65
End: 2025-03-25
Payer: MEDICARE

## 2025-03-25 DIAGNOSIS — I10 ESSENTIAL HYPERTENSION: ICD-10-CM

## 2025-03-25 DIAGNOSIS — E78.2 MIXED HYPERLIPIDEMIA: ICD-10-CM

## 2025-03-25 DIAGNOSIS — E11.65 TYPE 2 DIABETES MELLITUS WITH HYPERGLYCEMIA, WITH LONG-TERM CURRENT USE OF INSULIN: ICD-10-CM

## 2025-03-25 DIAGNOSIS — Z79.4 TYPE 2 DIABETES MELLITUS WITH HYPERGLYCEMIA, WITH LONG-TERM CURRENT USE OF INSULIN: ICD-10-CM

## 2025-03-25 DIAGNOSIS — E11.9 TYPE 2 DIABETES MELLITUS WITHOUT COMPLICATION, WITH LONG-TERM CURRENT USE OF INSULIN: ICD-10-CM

## 2025-03-25 DIAGNOSIS — Z79.4 TYPE 2 DIABETES MELLITUS WITHOUT COMPLICATION, WITH LONG-TERM CURRENT USE OF INSULIN: ICD-10-CM

## 2025-03-25 RX ORDER — AMLODIPINE AND BENAZEPRIL HYDROCHLORIDE 10; 40 MG/1; MG/1
1 CAPSULE ORAL
Qty: 90 CAPSULE | Refills: 3 | Status: SHIPPED | OUTPATIENT
Start: 2025-03-25

## 2025-03-25 RX ORDER — GABAPENTIN 300 MG/1
300 CAPSULE ORAL
Qty: 90 CAPSULE | Refills: 3 | Status: SHIPPED | OUTPATIENT
Start: 2025-03-25

## 2025-03-25 RX ORDER — GLIPIZIDE 5 MG/1
5 TABLET, FILM COATED, EXTENDED RELEASE ORAL
Qty: 90 TABLET | Refills: 3 | Status: SHIPPED | OUTPATIENT
Start: 2025-03-25

## 2025-03-25 RX ORDER — ATORVASTATIN CALCIUM 10 MG/1
10 TABLET, FILM COATED ORAL
Qty: 90 TABLET | Refills: 3 | Status: SHIPPED | OUTPATIENT
Start: 2025-03-25

## 2025-03-25 NOTE — TELEPHONE ENCOUNTER
Patient advised that a rx [facility] was [facility] with the incorrect information (where the note was sent back to pharmacy) and that the rx's (4) were sent under correct rx. He voiced understanding. Mohsen

## 2025-03-25 NOTE — TELEPHONE ENCOUNTER
Copied from CRM #[id number]. Topic: Medications - Medication Refill  >> Mar 25, 2025  8:37 AM Haley wrote:  Who Called: Sergo Hollingsworth    Caller is requesting assistance/information from provider's office.    Symptoms (please be specific):    How long has patient had these symptoms:    List of preferred pharmacies on file (remove unneeded): [unfilled]  If different, enter pharmacy into here including location and phone number:       Preferred Method of Contact: Phone Call  Patient's Preferred Phone Number on File: [phone]   Best Call Back Number, if different:  Additional Information: pt stated he was told by [facility] rep, he  needs to speak to provider before scripts that were sent over today is filled (pt stated he was not given a reason why, he has to speak to provider before medication is filled).

## 2025-05-20 ENCOUNTER — LAB VISIT (OUTPATIENT)
Dept: LAB | Facility: HOSPITAL | Age: 65
End: 2025-05-20
Attending: FAMILY MEDICINE
Payer: MEDICARE

## 2025-05-20 ENCOUNTER — RESULTS FOLLOW-UP (OUTPATIENT)
Dept: FAMILY MEDICINE | Facility: CLINIC | Age: 65
End: 2025-05-20

## 2025-05-20 DIAGNOSIS — Z79.4 TYPE 2 DIABETES MELLITUS WITH BOTH EYES AFFECTED BY MILD NONPROLIFERATIVE RETINOPATHY WITHOUT MACULAR EDEMA, WITH LONG-TERM CURRENT USE OF INSULIN: ICD-10-CM

## 2025-05-20 DIAGNOSIS — E11.3293 TYPE 2 DIABETES MELLITUS WITH BOTH EYES AFFECTED BY MILD NONPROLIFERATIVE RETINOPATHY WITHOUT MACULAR EDEMA, WITH LONG-TERM CURRENT USE OF INSULIN: ICD-10-CM

## 2025-05-20 LAB
ANION GAP SERPL CALC-SCNC: 7 MEQ/L
BUN SERPL-MCNC: 19.1 MG/DL (ref 8.4–25.7)
CALCIUM SERPL-MCNC: 9.1 MG/DL (ref 8.8–10)
CHLORIDE SERPL-SCNC: 112 MMOL/L (ref 98–107)
CO2 SERPL-SCNC: 22 MMOL/L (ref 23–31)
CREAT SERPL-MCNC: 1.28 MG/DL (ref 0.72–1.25)
CREAT/UREA NIT SERPL: 15
EST. AVERAGE GLUCOSE BLD GHB EST-MCNC: 159.9 MG/DL
GFR SERPLBLD CREATININE-BSD FMLA CKD-EPI: >60 ML/MIN/1.73/M2
GLUCOSE SERPL-MCNC: 201 MG/DL (ref 82–115)
HBA1C MFR BLD: 7.2 %
POTASSIUM SERPL-SCNC: 4 MMOL/L (ref 3.5–5.1)
SODIUM SERPL-SCNC: 141 MMOL/L (ref 136–145)

## 2025-05-20 PROCEDURE — 83036 HEMOGLOBIN GLYCOSYLATED A1C: CPT

## 2025-05-20 PROCEDURE — 36415 COLL VENOUS BLD VENIPUNCTURE: CPT

## 2025-05-20 PROCEDURE — 80048 BASIC METABOLIC PNL TOTAL CA: CPT

## 2025-08-23 ENCOUNTER — PATIENT MESSAGE (OUTPATIENT)
Dept: RESEARCH | Facility: HOSPITAL | Age: 65
End: 2025-08-23
Payer: MEDICARE